# Patient Record
Sex: FEMALE | Race: WHITE | Employment: OTHER | RURAL
[De-identification: names, ages, dates, MRNs, and addresses within clinical notes are randomized per-mention and may not be internally consistent; named-entity substitution may affect disease eponyms.]

---

## 2017-01-13 ENCOUNTER — OFFICE VISIT (OUTPATIENT)
Dept: FAMILY MEDICINE CLINIC | Age: 57
End: 2017-01-13

## 2017-01-13 VITALS
BODY MASS INDEX: 39.16 KG/M2 | DIASTOLIC BLOOD PRESSURE: 82 MMHG | HEIGHT: 63 IN | OXYGEN SATURATION: 99 % | TEMPERATURE: 98.2 F | WEIGHT: 221 LBS | HEART RATE: 93 BPM | SYSTOLIC BLOOD PRESSURE: 122 MMHG | RESPIRATION RATE: 16 BRPM

## 2017-01-13 DIAGNOSIS — F41.9 ANXIETY: ICD-10-CM

## 2017-01-13 DIAGNOSIS — G47.00 INSOMNIA, UNSPECIFIED TYPE: Primary | ICD-10-CM

## 2017-01-13 RX ORDER — TRAZODONE HYDROCHLORIDE 100 MG/1
100 TABLET ORAL
COMMUNITY
End: 2018-05-09

## 2017-01-13 NOTE — PROGRESS NOTES
Identified pt with two pt identifiers(name and ). Chief Complaint   Patient presents with    Panic Attack     last night & this am per pt.  Insomnia      Pt presents today with elevated anxiety, panic attacks & insomnia per pt. Pt states \" i am driving my  crazy with my panic attacks & i cant sleep, i am going to kill myself by mixing all my meds just to get some sleep\"     Health Maintenance Due   Topic    BREAST CANCER SCRN MAMMOGRAM        Wt Readings from Last 3 Encounters:   17 221 lb (100.2 kg)   16 221 lb (100.2 kg)   16 221 lb (100.2 kg)     Temp Readings from Last 3 Encounters:   17 98.2 °F (36.8 °C) (Oral)   16 98 °F (36.7 °C) (Oral)   16 98.1 °F (36.7 °C) (Oral)     BP Readings from Last 3 Encounters:   17 122/82   16 120/78   16 160/79     Pulse Readings from Last 3 Encounters:   17 93   16 (!) 101   16 100         Learning Assessment:  :     Learning Assessment 2016 2015 2015 1/3/2014   PRIMARY LEARNER Patient Patient Patient Patient   HIGHEST LEVEL OF EDUCATION - PRIMARY LEARNER  - - - 4 Luis Antonio LEARNER - NONE - COGNITIVE   CO-LEARNER CAREGIVER - No - -   PRIMARY LANGUAGE ENGLISH ENGLISH ENGLISH ENGLISH   LEARNER PREFERENCE PRIMARY DEMONSTRATION DEMONSTRATION VIDEOS READING     - - - VIDEOS   ANSWERED BY patient  patient patient patient   RELATIONSHIP SELF SELF SELF SELF           Abuse Screening:  :     Abuse Screening Questionnaire 2015 3/10/2014   Do you ever feel afraid of your partner? N N   Are you in a relationship with someone who physically or mentally threatens you? N N   Is it safe for you to go home?  Y Y       Coordination of Care Questionnaire:  :     1) Have you been to an emergency room, urgent care clinic since your last visit? no   Hospitalized since your last visit? no             2) Have you seen or consulted any other health care providers outside of [address] [place] [address] since your last visit? no  (Include any pap smears or colon screenings in this section.)    3) Do you have an Advance Directive on file? no  Are you interested in receiving information about Advance Directives? no    Patient is accompanied by self I have received verbal consent from Kira Bower to discuss any/all medical information while they are present in the room. Reviewed record in preparation for visit and have obtained necessary documentation. Medication reconciliation up to date and corrected with patient at this time.

## 2017-01-13 NOTE — PROGRESS NOTES
Subjective:      Samanta Santos is a 64 y.o. female here with complaint of \"panic attacks\". Her Psychiatrist is Dr. Nia Kelsey. Reports that she has been experiencing shortness of breath. She reports that she has had to open her windows to help calm down her anxiety. She has been mixing medications at night to help her sleep - Elavil, Phenergan, Ambien, Benadryl, alcoholic beverages to help relax her to help her sleep. States that she and her Psychiatrist have worked to wean her off of her Xanax - reports that she was taking 1 mg nightly for sleep. She was started on Trazodone therapy about 3 weeks ago which she reports has been causing her to feel \"wired\". She was also started on another medication which she cannot recall the name for. She reports that she called Dr. Rayo Dos Santos office and scheduled for appointment next week. Current Outpatient Prescriptions   Medication Sig Dispense Refill    tapentadol (NUCYNTA ER) 150 mg Tb12 Take  by mouth.  traZODone (DESYREL) 100 mg tablet Take 100 mg by mouth nightly.  AMITRIPTYLINE HCL (AMITRIPTYLINE PO) Take  by mouth. Indications: unknown dosage, prescribed by her Psychiatrist      terbinafine HCl (LAMISIL) 250 mg tablet Take 1 Tab by mouth daily. X 12 weeks. 90 Tab 0    hydrOXYzine HCl (ATARAX) 25 mg tablet Take 1 Tab by mouth three (3) times daily as needed for Itching. 30 Tab 2    ondansetron (ZOFRAN ODT) 8 mg disintegrating tablet Take 8 mg by mouth every eight (8) hours as needed for Nausea.  promethazine (PHENERGAN) 25 mg tablet Take 25 mg by mouth every six (6) hours as needed for Nausea.  dextroamphetamine-amphetamine (ADDERALL) 10 mg tablet Take 10 mg by mouth two (2) times a day.  CYCLOBENZAPRINE HCL (FLEXERIL PO) Take 10 mg by mouth Before breakfast, lunch, and dinner.  omeprazole (PRILOSEC) 40 mg capsule Take 1 Cap by mouth daily for 90 days.  90 Cap 1    albuterol (PROAIR HFA) 90 mcg/actuation inhaler Take 1 Puff by inhalation every four (4) hours as needed for Wheezing or Shortness of Breath. 1 Inhaler 3    gabapentin (NEURONTIN) 400 mg capsule Take 800 mg by mouth three (3) times daily.  DULoxetine (CYMBALTA) 60 mg capsule Take 60 mg by mouth two (2) times a day. Allergies   Allergen Reactions    Bee Sting [Sting, Bee] Anaphylaxis    Morphine Other (comments)     Severe stomach cramps     Vicodin [Hydrocodone-Acetaminophen] Hives       Past Medical History   Diagnosis Date    Acid reflux 1/17/2011    Anemia     Burn     Chronic pain      Followed by Dr. Trisha Sena Dysthymic disorder 11/9/2012    Fibromyalgia     HNP (herniated nucleus pulposus with myelopathy), thoracic     Kidney calculi     Lymphedema     Memory loss, short term     MI [facility] [address]     Mixed hyperlipidemia 4/3/2013    MVP (mitral valve prolapse)     Neurological disorder      TIA L hemiparesis    PCOS (polycystic ovarian syndrome)     PTSD (post-traumatic stress disorder)     Scoliosis     Scoliosis     Stroke [facility])      april 2014    Vitamin B 12 deficiency        Social History   Substance Use Topics    Smoking status: Never Smoker    Smokeless tobacco: Never Used    Alcohol use No        Review of Systems  Pertinent items are noted in HPI. Objective:     Visit Vitals    /82    Pulse 93    Temp 98.2 °F (36.8 °C) (Oral)    Resp 16    Ht 5' 3\" (1.6 m)    Wt 221 lb (100.2 kg)    LMP 07/15/2011    SpO2 99%    BMI 39.15 kg/m2      General appearance - alert, well appearing, and in no distress  Chest - respirations unlabored   Neurological - alert, oriented, normal speech, no focal findings or movement disorder noted    Assessment/Plan:   Aileen Grewal is a 64 y.o. female seen for:     1. Insomnia, unspecified type: discussed that she should not be mixing her medications with alcohol in an attempt to sleep as this could have dangerous consequences such as death.  She needs to follow back up with Dr. Luz Matias. I advised her that I will not prescribe her Xanax for this condition and will not be prescribing any other sleep medications. Appears that her insomnia may in part be due to her anxiety which she needs to see Dr. Luz Matias. She contacted her counselor who she reports stated that Dr. Luz Matias was to give me a call to discuss my prescribing her Xanax as he could not phone medication in (call did not come while I was in the office). Pharmacy was contacted to see which medications were recently prescribed and I was informed of the following: Elavil 50 mg nightly, trazodone 50 mg nightly, and levetiracetam 500 mg nightly. She did have Xanax prescription on hold (last reported fill date was 11/19/16). Patient was advised of this. 2. Anxiety: under the care of Psychiatry and needs follow up. I have discussed the diagnosis with the patient and the intended plan as seen in the above orders. The patient has received an after-visit summary and questions were answered concerning future plans. I have discussed medication side effects and warnings with the patient as well. Patient verbalizes understanding of plan of care and denies further questions or concerns at this time. Informed patient to return to the office if symptoms worsen or if new symptoms arise.     Follow-up Disposition: Not on File

## 2017-01-23 RX ORDER — PREDNISONE 10 MG/1
TABLET ORAL
Qty: 21 TAB | Refills: 0 | OUTPATIENT
Start: 2017-01-23

## 2017-01-23 NOTE — TELEPHONE ENCOUNTER
Patient called stating that she is having a reaction to laundry detergent and has requested a refill of medication. Patient uses [facility] in [place].      Patient's phone number: [phone]

## 2017-02-21 ENCOUNTER — HOSPITAL ENCOUNTER (OUTPATIENT)
Dept: LAB | Age: 57
Discharge: HOME | End: 2017-02-21
Payer: MEDICARE

## 2017-02-21 ENCOUNTER — OFFICE VISIT (OUTPATIENT)
Dept: FAMILY MEDICINE CLINIC | Age: 57
End: 2017-02-21

## 2017-02-21 VITALS
HEIGHT: 63 IN | HEART RATE: 78 BPM | SYSTOLIC BLOOD PRESSURE: 118 MMHG | RESPIRATION RATE: 16 BRPM | TEMPERATURE: 98.2 F | DIASTOLIC BLOOD PRESSURE: 78 MMHG | OXYGEN SATURATION: 99 % | WEIGHT: 220 LBS | BODY MASS INDEX: 38.98 KG/M2

## 2017-02-21 DIAGNOSIS — R30.0 DYSURIA: ICD-10-CM

## 2017-02-21 DIAGNOSIS — R11.0 NAUSEA: ICD-10-CM

## 2017-02-21 DIAGNOSIS — J01.00 ACUTE NON-RECURRENT MAXILLARY SINUSITIS: Primary | ICD-10-CM

## 2017-02-21 LAB
BILIRUB UR QL STRIP: NEGATIVE
GLUCOSE UR-MCNC: NEGATIVE MG/DL
KETONES P FAST UR STRIP-MCNC: NEGATIVE MG/DL
PH UR STRIP: 5 [PH] (ref 4.6–8)
PROT UR QL STRIP: NEGATIVE MG/DL
SP GR UR STRIP: 1.02 (ref 1–1.03)
UA UROBILINOGEN AMB POC: NORMAL (ref 0.2–1)
URINALYSIS CLARITY POC: CLEAR
URINALYSIS COLOR POC: YELLOW
URINE BLOOD POC: NEGATIVE
URINE LEUKOCYTES POC: NEGATIVE
URINE NITRITES POC: NEGATIVE

## 2017-02-21 PROCEDURE — 87086 URINE CULTURE/COLONY COUNT: CPT

## 2017-02-21 RX ORDER — PROMETHAZINE HYDROCHLORIDE 12.5 MG/1
12.5 TABLET ORAL
Qty: 12 TAB | Refills: 0 | Status: SHIPPED | OUTPATIENT
Start: 2017-02-21 | End: 2018-04-09

## 2017-02-21 RX ORDER — OXYMORPHONE HYDROCHLORIDE 10 MG/1
10 TABLET ORAL
COMMUNITY
End: 2018-05-09

## 2017-02-21 RX ORDER — PROMETHAZINE HYDROCHLORIDE 25 MG/1
25 TABLET ORAL
OUTPATIENT
Start: 2017-02-21

## 2017-02-21 RX ORDER — AMOXICILLIN AND CLAVULANATE POTASSIUM 875; 125 MG/1; MG/1
1 TABLET, FILM COATED ORAL 2 TIMES DAILY
Qty: 20 TAB | Refills: 0 | Status: SHIPPED | OUTPATIENT
Start: 2017-02-21 | End: 2017-03-03

## 2017-02-21 NOTE — MR AVS SNAPSHOT
Visit Information Date & Time Provider Department Dept. Phone Encounter #  
 2/21/2017  3:15 PM Kenneth SotoEthan [phone] [id number] Follow-up Instructions Return if symptoms worsen or fail to improve. Upcoming Health Maintenance Date Due  
 BREAST CANCER SCRN MAMMOGRAM 12/1/2016 COLONOSCOPY 11/28/2017 PAP AKA CERVICAL CYTOLOGY 11/19/2018 DTaP/Tdap/Td series (2 - Td) 6/30/2025 Allergies as of 2/21/2017  Review Complete On: 2/21/2017 By: Kenneth Soto NP Severity Noted Reaction Type Reactions Bee Sting [Sting, Bee]  03/12/2012    Anaphylaxis Morphine  02/04/2016    Other (comments) Severe stomach cramps Vicodin [Hydrocodone-acetaminophen]    Hives Current Immunizations  Reviewed on 6/30/2015 Name Date Influenza Vaccine Alcario Ravens) 10/21/2016 Influenza Vaccine PF 11/13/2013 Influenza Vaccine Whole 10/1/2012 Tdap 6/30/2015  3:59 PM  
  
 Not reviewed this visit You Were Diagnosed With   
  
 Codes Comments Acute non-recurrent maxillary sinusitis    -  Primary ICD-10-CM: J01.00 ICD-9-CM: 461.0 Dysuria     ICD-10-CM: R30.0 ICD-9-CM: 788.1 Nausea     ICD-10-CM: R11.0 ICD-9-CM: 787.02 Vitals BP Pulse Temp Resp Height(growth percentile) Weight(growth percentile) 118/78 78 98.2 °F (36.8 °C) (Oral) 16 5' 3\" (1.6 m) 220 lb (99.8 kg) LMP SpO2 BMI OB Status Smoking Status 07/15/2011 99% 38.97 kg/m2 Postmenopausal Never Smoker BMI and BSA Data Body Mass Index Body Surface Area  
 38.97 kg/m 2 2.11 m 2 Preferred Pharmacy Pharmacy Name Phone [facility] [address] [place] [address] [phone] Your Updated Medication List  
  
   
This list is accurate as of: 2/21/17  3:22 PM.  Always use your most recent med list.  
  
  
  
  
 albuterol 90 mcg/actuation inhaler Commonly known as:  PROAIR HFA  
 Take 1 Puff by inhalation every four (4) hours as needed for Wheezing or Shortness of Breath. AMITRIPTYLINE PO Take  by mouth. Indications: unknown dosage, prescribed by her Psychiatrist  
  
 amoxicillin-clavulanate 875-125 mg per tablet Commonly known as:  AUGMENTIN Take 1 Tab by mouth two (2) times a day for 10 days. CYMBALTA 60 mg capsule Generic drug:  DULoxetine Take 60 mg by mouth two (2) times a day. dextroamphetamine-amphetamine 10 mg tablet Commonly known as:  ADDERALL Take 10 mg by mouth two (2) times a day. FLEXERIL PO Take 10 mg by mouth Before breakfast, lunch, and dinner. gabapentin 400 mg capsule Commonly known as:  NEURONTIN Take 800 mg by mouth three (3) times daily. hydrOXYzine HCl 25 mg tablet Commonly known as:  ATARAX Take 1 Tab by mouth three (3) times daily as needed for Itching. NUCYNTA  mg Tb12 Generic drug:  tapentadol Take  by mouth. omeprazole 40 mg capsule Commonly known as:  PRILOSEC Take 1 Cap by mouth daily for 90 days. oxyMORphone 10 mg Tab tablet Commonly known as:  OPANA Take 10 mg by mouth every six (6) hours as needed for Pain. promethazine 12.5 mg tablet Commonly known as:  PHENERGAN Take 1 Tab by mouth every eight (8) hours as needed for Nausea. terbinafine HCl 250 mg tablet Commonly known as:  LAMISIL Take 1 Tab by mouth daily. X 12 weeks. traZODone 100 mg tablet Commonly known as:  Pavel Nathalia Take 100 mg by mouth nightly. ZOFRAN ODT 8 mg disintegrating tablet Generic drug:  ondansetron Take 8 mg by mouth every eight (8) hours as needed for Nausea. Prescriptions Sent to Pharmacy Refills  
 amoxicillin-clavulanate (AUGMENTIN) 875-125 mg per tablet 0 Sig: Take 1 Tab by mouth two (2) times a day for 10 days. Class: Normal  
 Pharmacy: [address] [place] Ph #: [phone] Route: Oral  
 promethazine (PHENERGAN) 12.5 mg tablet 0 Sig: Take 1 Tab by mouth every eight (8) hours as needed for Nausea. Class: Normal  
 [facility]  #: [phone] Route: Oral  
  
We Performed the Following AMB POC URINALYSIS DIP STICK AUTO W/O MICRO [64165 CPT(R)] CULTURE, URINE [id number] CPT(R)] Follow-up Instructions Return if symptoms worsen or fail to improve. Patient Instructions Sinusitis: Care Instructions Your Care Instructions Sinusitis is an infection of the lining of the sinus cavities in your head. Sinusitis often follows a cold. It causes pain and pressure in your head and face. In most cases, sinusitis gets better on its own in 1 to 2 weeks. But some mild symptoms may last for several weeks. Sometimes antibiotics are needed. Follow-up care is a key part of your treatment and safety. Be sure to make and go to all appointments, and call your doctor if you are having problems. It's also a good idea to know your test results and keep a list of the medicines you take. How can you care for yourself at home? · Take an over-the-counter pain medicine, such as acetaminophen (Tylenol), ibuprofen (Advil, Motrin), or naproxen (Aleve). Read and follow all instructions on the label. · If the doctor prescribed antibiotics, take them as directed. Do not stop taking them just because you feel better. You need to take the full course of antibiotics. · Be careful when taking over-the-counter cold or flu medicines and Tylenol at the same time. Many of these medicines have acetaminophen, which is Tylenol. Read the labels to make sure that you are not taking more than the recommended dose. Too much acetaminophen (Tylenol) can be harmful. · Breathe warm, moist air from a steamy shower, a hot bath, or a sink filled with hot water. Avoid cold, dry air.  Using a humidifier in your home may help. Follow the directions for cleaning the machine. · Use saline (saltwater) nasal washes to help keep your nasal passages open and wash out mucus and bacteria. You can buy saline nose drops at a grocery store or drugstore. Or you can make your own at home by adding 1 teaspoon of salt and 1 teaspoon of baking soda to 2 cups of distilled water. If you make your own, fill a bulb syringe with the solution, insert the tip into your nostril, and squeeze gently. Melvia Creamer your nose. · Put a hot, wet towel or a warm gel pack on your face 3 or 4 times a day for 5 to 10 minutes each time. · Try a decongestant nasal spray like oxymetazoline (Afrin). Do not use it for more than 3 days in a row. Using it for more than 3 days can make your congestion worse. When should you call for help? Call your doctor now or seek immediate medical care if: 
· You have new or worse swelling or redness in your face or around your eyes. · You have a new or higher fever. Watch closely for changes in your health, and be sure to contact your doctor if: 
· You have new or worse facial pain. · The mucus from your nose becomes thicker (like pus) or has new blood in it. · You are not getting better as expected. Where can you learn more? Go to http://jonathon-margareth.info/. Enter B792 in the search box to learn more about \"Sinusitis: Care Instructions. \" Current as of: July 29, 2016 Content Version: 11.1 © 1320-1623 [employer]. Care instructions adapted under license by [facility] (which disclaims liability or warranty for this information). If you have questions about a medical condition or this instruction, always ask your healthcare professional. [place] [address] any warranty or liability for your use of this information. Introducing [facility]! Dear Delores Márquez: 
Thank you for requesting a [employer] account.   Our records indicate that you already have an active [employer] account. You can access your account anytime at [email or web address]. [email or web address] Did you know that you can access your hospital and ER discharge instructions at any time in [employer]? You can also review all of your test results from your hospital stay or ER visit. Additional Information If you have questions, please visit the Frequently Asked Questions section of the [employer] website at [email or web address] Remember, [employer] is NOT to be used for urgent needs. For medical emergencies, dial 911. Now available from your iPhone and Android! Please provide this summary of care documentation to your next provider. Your primary care clinician is listed as [address]. If you have any questions after today's visit, please call [phone].

## 2017-02-21 NOTE — PATIENT INSTRUCTIONS
Sinusitis: Care Instructions  Your Care Instructions    Sinusitis is an infection of the lining of the sinus cavities in your head. Sinusitis often follows a cold. It causes pain and pressure in your head and face. In most cases, sinusitis gets better on its own in 1 to 2 weeks. But some mild symptoms may last for several weeks. Sometimes antibiotics are needed. Follow-up care is a key part of your treatment and safety. Be sure to make and go to all appointments, and call your doctor if you are having problems. It's also a good idea to know your test results and keep a list of the medicines you take. How can you care for yourself at home? · Take an over-the-counter pain medicine, such as acetaminophen (Tylenol), ibuprofen (Advil, Motrin), or naproxen (Aleve). Read and follow all instructions on the label. · If the doctor prescribed antibiotics, take them as directed. Do not stop taking them just because you feel better. You need to take the full course of antibiotics. · Be careful when taking over-the-counter cold or flu medicines and Tylenol at the same time. Many of these medicines have acetaminophen, which is Tylenol. Read the labels to make sure that you are not taking more than the recommended dose. Too much acetaminophen (Tylenol) can be harmful. · Breathe warm, moist air from a steamy shower, a hot bath, or a sink filled with hot water. Avoid cold, dry air. Using a humidifier in your home may help. Follow the directions for cleaning the machine. · Use saline (saltwater) nasal washes to help keep your nasal passages open and wash out mucus and bacteria. You can buy saline nose drops at a grocery store or drugstore. Or you can make your own at home by adding 1 teaspoon of salt and 1 teaspoon of baking soda to 2 cups of distilled water. If you make your own, fill a bulb syringe with the solution, insert the tip into your nostril, and squeeze gently. Alease Ruffini your nose.   · Put a hot, wet towel or a warm gel pack on your face 3 or 4 times a day for 5 to 10 minutes each time. · Try a decongestant nasal spray like oxymetazoline (Afrin). Do not use it for more than 3 days in a row. Using it for more than 3 days can make your congestion worse. When should you call for help? Call your doctor now or seek immediate medical care if:  · You have new or worse swelling or redness in your face or around your eyes. · You have a new or higher fever. Watch closely for changes in your health, and be sure to contact your doctor if:  · You have new or worse facial pain. · The mucus from your nose becomes thicker (like pus) or has new blood in it. · You are not getting better as expected. Where can you learn more? Go to http://jonathon-margareth.info/. Enter D000 in the search box to learn more about \"Sinusitis: Care Instructions. \"  Current as of: July 29, 2016  Content Version: 11.1  © 20062731-3141 [employer]. Care instructions adapted under license by [employer] (which disclaims liability or warranty for this information). If you have questions about a medical condition or this instruction, always ask your healthcare professional. [place] [address] any warranty or liability for your use of this information.

## 2017-02-21 NOTE — PROGRESS NOTES
Identified pt with two pt identifiers(name and ). Chief Complaint   Patient presents with    Urinary Frequency    Cold Symptoms    Sinus Pain        Health Maintenance Due   Topic    BREAST CANCER SCRN MAMMOGRAM        Wt Readings from Last 3 Encounters:   17 220 lb (99.8 kg)   17 221 lb (100.2 kg)   16 221 lb (100.2 kg)     Temp Readings from Last 3 Encounters:   17 98.2 °F (36.8 °C) (Oral)   17 98.2 °F (36.8 °C) (Oral)   16 98 °F (36.7 °C) (Oral)     BP Readings from Last 3 Encounters:   17 118/78   17 122/82   16 120/78     Pulse Readings from Last 3 Encounters:   17 78   17 93   16 (!) 101         Learning Assessment:  :     Learning Assessment 2016 2015 2015 1/3/2014   PRIMARY LEARNER Patient Patient Patient Patient   HIGHEST LEVEL OF EDUCATION - PRIMARY LEARNER  - - - 4 YEARS OF COLLEGE   BARRIERS PRIMARY LEARNER - NONE - COGNITIVE   CO-LEARNER CAREGIVER - No - -   PRIMARY LANGUAGE ENGLISH ENGLISH ENGLISH ENGLISH   LEARNER PREFERENCE PRIMARY DEMONSTRATION DEMONSTRATION VIDEOS READING     - - - VIDEOS   ANSWERED BY patient  patient patient patient   RELATIONSHIP SELF SELF SELF SELF           Abuse Screening:  :     Abuse Screening Questionnaire 2015 3/10/2014   Do you ever feel afraid of your partner? N N   Are you in a relationship with someone who physically or mentally threatens you? N N   Is it safe for you to go home? Y Y       Coordination of Care Questionnaire:  :     1) Have you been to an emergency room, urgent care clinic since your last visit? no   Hospitalized since your last visit? no             2) Have you seen or consulted any other health care providers outside of [address] [place] [address] since your last visit? no  (Include any pap smears or colon screenings in this section.)    3) Do you have an Advance Directive on file?  no  Are you interested in receiving information about Advance Directives? no    Patient is accompanied by self I have received verbal consent from Mau Angel to discuss any/all medical information while they are present in the room. Reviewed record in preparation for visit and have obtained necessary documentation. Medication reconciliation up to date and corrected with patient at this time.

## 2017-02-21 NOTE — TELEPHONE ENCOUNTER
Unclear why patient needs medication. She has only been seen by myself for acute visit only. Would advise that she be seen in the office to discuss need for medication.

## 2017-02-21 NOTE — PROGRESS NOTES
HISTORY OF PRESENT ILLNESS  Yani Fischer is a 64 y.o. female. HPI  Pt presents with \"urinary frequency and cold symptoms\"    Pt states that she feels like she may have a bladder infection. Symptoms started yesterday, and have been worsening since then. Only symptom that patient is having is increased urinary frequency. Pt states that it feels like she has to \"pee all the time\". No pain with urination, no blood in urine  No abdominal pain  No vaginal discharge, no vaginal pain    In addition, patient believes that she has a sinus infection. Sinus pain and pressure has been present for at least 5 days, and seems to be worsening. Thick, yellow/green nasal congestion  Sinus pain  Bilateral ear pain  No fever    Lastly, patient states that there are 2 medications that she used to get from her pain management doctor, Dr. Wily Jones, that she would like to see if she could get at this practice. These medications are Cymbalta, and Gabapentin. Pt states that it is difficult for her to get in with this provider, and since these are not narcotics, she does not see why there would be a problem with us prescribing them. Pt is not sure of exact dosing of both of these medications, and we do not have notes from Dr. Wily Jones. Pt is continuing to see her psychiatrist, Dr. Bernabe Chavira, for all of her psych medications. Pt also requesting refill of Phenergan, for nausea that she gets post gastric bypass, at times. Review of Systems   Constitutional: Negative for fever. HENT: Positive for congestion and ear pain. Gastrointestinal: Negative for abdominal pain, diarrhea and vomiting. Genitourinary: Positive for frequency. Negative for dysuria, hematuria and urgency. Neurological: Positive for headaches. Physical Exam   Constitutional: She is oriented to person, place, and time. She appears well-developed and well-nourished. HENT:   Head: Normocephalic and atraumatic.    Right Ear: Hearing, tympanic membrane, external ear and ear canal normal.   Left Ear: Hearing, tympanic membrane, external ear and ear canal normal.   Nose: Mucosal edema and rhinorrhea present. Right sinus exhibits maxillary sinus tenderness and frontal sinus tenderness. Left sinus exhibits maxillary sinus tenderness and frontal sinus tenderness. Mouth/Throat: Oropharynx is clear and moist.   Neck: Normal range of motion. Neck supple. Cardiovascular: Normal rate, regular rhythm and normal heart sounds. Pulmonary/Chest: Effort normal and breath sounds normal.   Abdominal: Soft. Bowel sounds are normal. There is no tenderness. There is no guarding. Lymphadenopathy:     She has no cervical adenopathy. Neurological: She is alert and oriented to person, place, and time. Skin: Skin is warm and dry. Psychiatric: She has a normal mood and affect. Her behavior is normal.       ASSESSMENT and PLAN    ICD-10-CM ICD-9-CM    1. Acute non-recurrent maxillary sinusitis J01.00 461.0 amoxicillin-clavulanate (AUGMENTIN) 875-125 mg per tablet   2. Dysuria R30.0 788.1 AMB POC URINALYSIS DIP STICK AUTO W/O MICRO      CULTURE, URINE   3. Nausea R11.0 787.02 promethazine (PHENERGAN) 12.5 mg tablet     Informed patient that I have sent antibiotic to the pharmacy, and she should take as prescribed. Educated about taking with food, to decrease the risk of stomach upset. Educated about staying well hydrated, and voiding as often as she feels the urge. Informed patient that we will notify her when her urine culture returns, and inform her of any change in plan of care at that time. Educated that these medications could be filled here (Cymbalta and Gabapentin), but she needs to get office notes and medications faxed from Dr. Onel Lopez office, in order to ensure correct dosing, etc.    Pt informed to return to office with worsening of symptoms, or PRN with any questions or concerns.   Pt verbalizes understanding of plan of care and denies further questions or concerns at this time.

## 2017-02-23 ENCOUNTER — TELEPHONE (OUTPATIENT)
Dept: FAMILY MEDICINE CLINIC | Age: 57
End: 2017-02-23

## 2017-02-23 DIAGNOSIS — R35.0 URINARY FREQUENCY: Primary | ICD-10-CM

## 2017-02-23 LAB
BACTERIA UR CULT: NORMAL
BACTERIA UR CULT: NORMAL

## 2017-02-23 NOTE — PROGRESS NOTES
Please call patient and let her know that her urine culture returned negative, no UTI. As discussed in office, she can make appointment with urology to discuss urinary frequency. [phone]. Thanks!

## 2017-02-23 NOTE — TELEPHONE ENCOUNTER
----- Message from Kwan Pedro NP sent at 2/23/2017  8:11 AM EST -----  Please call patient and let her know that her urine culture returned negative, no UTI. As discussed in office, she can make appointment with urology to discuss urinary frequency. [phone]. Thanks!

## 2017-03-03 ENCOUNTER — APPOINTMENT (OUTPATIENT)
Dept: GENERAL RADIOLOGY | Age: 57
End: 2017-03-03
Attending: EMERGENCY MEDICINE
Payer: MEDICARE

## 2017-03-03 ENCOUNTER — HOSPITAL ENCOUNTER (EMERGENCY)
Age: 57
Discharge: HOME | End: 2017-03-03
Attending: EMERGENCY MEDICINE
Payer: MEDICARE

## 2017-03-03 VITALS
DIASTOLIC BLOOD PRESSURE: 77 MMHG | HEIGHT: 65 IN | HEART RATE: 89 BPM | WEIGHT: 220 LBS | TEMPERATURE: 98.3 F | SYSTOLIC BLOOD PRESSURE: 135 MMHG | OXYGEN SATURATION: 99 % | RESPIRATION RATE: 16 BRPM | BODY MASS INDEX: 36.65 KG/M2

## 2017-03-03 DIAGNOSIS — M25.562 ACUTE PAIN OF LEFT KNEE: Primary | ICD-10-CM

## 2017-03-03 PROCEDURE — 99282 EMERGENCY DEPT VISIT SF MDM: CPT

## 2017-03-03 PROCEDURE — 73562 X-RAY EXAM OF KNEE 3: CPT

## 2017-03-03 NOTE — ED PROVIDER NOTES
HPI Comments: 64 y.o. female with extensive past medical history, please see list, significant for chronic pain, scoliosis, TIA, hyperlipidemia, fibromyalgia, MVP, MI, HNP and gastric bypass who presents from home with chief complaint of knee pain. Pt states that last night at 2030 when she was moving from a sitting to standing position, she felt her left knee \"pop\" causing radiating pain down to the heel, back into the hip and into the shoulders and left side of the neck. Pt says that she received an injection in the left knee 6 weeks ago. Pt notes that she has a pain management doctor and currently takes Oxymorphone, Oxycodone, Flexeril, Neurontin and Cymbalta, none of which helped her pain. Pt also reports having used a heating pad w/o relief. Pt states that she is unable to lift the left leg and has to drag it. Pt says that she uses a walker at home. Pt claims to have seen [facility] in the past. There are no other acute medical concerns at this time. PCP: Joshua Oakely MD  Pain Managment: Dr. Brooklynn Bonner. Wily Jones MD and Dr. Van Medina MD    Note written by Angie Paulino. Chantell Patel, as dictated by Mariposa Rodriguez MD 1:54 PM      The history is provided by the patient. No  was used.         Past Medical History:   Diagnosis Date    Acid reflux 1/17/2011    Anemia     Burn     Chronic pain     Followed by Dr. Anitha Carrera Dysthymic disorder 11/9/2012    Fibromyalgia     HNP (herniated nucleus pulposus with myelopathy), thoracic     Kidney calculi     Lymphedema     Memory loss, short term     MI [facility]ocardial infarction) ([facility] [address]     Mixed hyperlipidemia 4/3/2013    MVP (mitral valve prolapse)     Neurological disorder     TIA L hemiparesis    PCOS (polycystic ovarian syndrome)     PTSD (post-traumatic stress disorder)     Scoliosis     Scoliosis     Stroke [facility])     april 2014    Vitamin B 12 deficiency        Past Surgical History:   Procedure Laterality Date    CARDIAC SURG PROCEDURE UNLIST      heart attack, mitral valve prolapse    HX  SECTION  1985    , PCOS    HX CHOLECYSTECTOMY  99    [facility]: open gastric bypass, naseem,  umb hernia repair, tr. vagotomy    HX GASTRIC BYPASS  99    HX ORTHOPAEDIC  1953,1289,    bunion surgery, heel spur surgery         Family History:   Problem Relation Age of Onset    Cancer Mother     Hypertension Mother     Diabetes Father     Stroke Father     Hypertension Father        Social History     Social History    Marital status: LEGALLY      Spouse name: N/A    Number of children: N/A    Years of education: N/A     Occupational History    Not on file. Social History Main Topics    Smoking status: Never Smoker    Smokeless tobacco: Never Used    Alcohol use No    Drug use: No    Sexual activity: Yes     Birth control/ protection: None     Other Topics Concern     Service No    Blood Transfusions Yes     unknown    Caffeine Concern No    Occupational Exposure No    Hobby Hazards No    Sleep Concern Yes    Stress Concern Yes    Weight Concern Yes    Special Diet No    Back Care No    Exercise No    Bike Helmet No    Seat Belt Yes    Self-Exams Yes     Social History Narrative         ALLERGIES: Bee sting [sting, bee]; Morphine; and Vicodin [hydrocodone-acetaminophen]    Review of Systems   Musculoskeletal: Positive for neck pain. Pain of the left knee, radiating into the left hip and bilateral shoulder. All other systems reviewed and are negative. Vitals:    17 1341   BP: 139/80   Pulse: 93   Resp: 18   Temp: 98.3 °F (36.8 °C)   SpO2: 99%   Weight: 99.8 kg (220 lb)   Height: 5' 5\" (1.651 m)            Physical Exam   Constitutional: She is oriented to person, place, and time. No distress. Obese. HENT:   Head: Normocephalic and atraumatic. Eyes: Conjunctivae are normal. No scleral icterus. Neck: Neck supple. No tracheal deviation present. Cardiovascular: Normal rate, regular rhythm, normal heart sounds and intact distal pulses. Exam reveals no gallop and no friction rub. No murmur heard. 2+ pedal pulse. Pulmonary/Chest: Effort normal and breath sounds normal. She has no wheezes. She has no rales. Abdominal: Soft. She exhibits no distension. There is no tenderness. There is no rebound and no guarding. Musculoskeletal: She exhibits tenderness (medially, laterally, and posteriorly). She exhibits no edema. Not able to appreciate obvious swelling. Resists ROM. Neurological: She is alert and oriented to person, place, and time. Skin: Skin is warm and dry. No rash noted. Psychiatric: She has a normal mood and affect. Nursing note and vitals reviewed. Note written by Shawn Hatfield. Jamil Valdez, as dictated by Aysha Mcneil MD 1:54 PM      [facility]  ED Course       Procedures    Progress Note:  Results, treatment, and follow up plan have been discussed with patient. Questions were answered. Aysha Mcneil MD  3:42 PM    A/P: left knee pain - suspect sprain; ice/ rest/ NSAID/ ortho f/u.   Aysha Mcneil MD  3:42 PM

## 2017-03-03 NOTE — DISCHARGE INSTRUCTIONS
Knee Pain or Injury: Care Instructions  Your Care Instructions    Injuries are a common cause of knee problems. Sudden (acute) injuries may be caused by a direct blow to the knee. They can also be caused by abnormal twisting, bending, or falling on the knee. Pain, bruising, or swelling may be severe, and may start within minutes of the injury. Overuse is another cause of knee pain. Other causes are climbing stairs, kneeling, and other activities that use the knee. Everyday wear and tear, especially as you get older, also can cause knee pain. Rest, along with home treatment, often relieves pain and allows your knee to heal. If you have a serious knee injury, you may need tests and treatment. Follow-up care is a key part of your treatment and safety. Be sure to make and go to all appointments, and call your doctor if you are having problems. It's also a good idea to know your test results and keep a list of the medicines you take. How can you care for yourself at home? · Be safe with medicines. Read and follow all instructions on the label. ¨ If the doctor gave you a prescription medicine for pain, take it as prescribed. ¨ If you are not taking a prescription pain medicine, ask your doctor if you can take an over-the-counter medicine. · Rest and protect your knee. Take a break from any activity that may cause pain. · Put ice or a cold pack on your knee for 10 to 20 minutes at a time. Put a thin cloth between the ice and your skin. · Prop up a sore knee on a pillow when you ice it or anytime you sit or lie down for the next 3 days. Try to keep it above the level of your heart. This will help reduce swelling. · If your knee is not swollen, you can put moist heat, a heating pad, or a warm cloth on your knee. · If your doctor recommends an elastic bandage, sleeve, or other type of support for your knee, wear it as directed.   · Follow your doctor's instructions about how much weight you can put on your leg. Use a cane, crutches, or a walker as instructed. · Follow your doctor's instructions about activity during your healing process. If you can do mild exercise, slowly increase your activity. · Reach and stay at a healthy weight. Extra weight can strain the joints, especially the knees and hips, and make the pain worse. Losing even a few pounds may help. When should you call for help? Call 911 anytime you think you may need emergency care. For example, call if:  · You have symptoms of a blood clot in your lung (called a pulmonary embolism). These may include:  ¨ Sudden chest pain. ¨ Trouble breathing. ¨ Coughing up blood. Call your doctor now or seek immediate medical care if:  · You have severe or increasing pain. · Your leg or foot turns cold or changes color. · You cannot stand or put weight on your knee. · Your knee looks twisted or bent out of shape. · You cannot move your knee. · You have signs of infection, such as:  ¨ Increased pain, swelling, warmth, or redness. ¨ Red streaks leading from the knee. ¨ Pus draining from a place on your knee. ¨ A fever. · You have signs of a blood clot in your leg (called a deep vein thrombosis), such as:  ¨ Pain in your calf, back of the knee, thigh, or groin. ¨ Redness and swelling in your leg or groin. Watch closely for changes in your health, and be sure to contact your doctor if:  · You have tingling, weakness, or numbness in your knee. · You have any new symptoms, such as swelling. · You have bruises from a knee injury that last longer than 2 weeks. · You do not get better as expected. Where can you learn more? Go to http://jonathon-margareth.info/. Enter K195 in the search box to learn more about \"Knee Pain or Injury: Care Instructions. \"  Current as of: May 27, 2016  Content Version: 11.1  © 5375-2450 [employer].  Care instructions adapted under license by [employer] (which disclaims liability or warranty for this information). If you have questions about a medical condition or this instruction, always ask your healthcare professional. [address] any warranty or liability for your use of this information. We hope that we have addressed all of your medical concerns. The examination and treatment you received in the Emergency Department were for an emergent problem and were not intended as complete care. It is important that you follow up with your healthcare provider(s) for ongoing care. If your symptoms worsen or do not improve as expected, and you are unable to reach your usual health care provider(s), you should return to the Emergency Department. Today's healthcare is undergoing tremendous change, and patient satisfaction surveys are one of the many tools to assess the quality of medical care. You may receive a survey from the [employer] regarding your experience in the Emergency Department. I hope that your experience has been completely positive, particularly the medical care that I provided. As such, please participate in the survey; anything less than excellent does not meet my expectations or intentions. [address] [place] [address] and [employer] participate in nationally recognized quality of care measures. If your blood pressure is greater than 120/80, as reported below, we urge that you seek medical care to address the potential of high blood pressure, commonly known as hypertension. Hypertension can be hereditary or can be caused by certain medical conditions, pain, stress, or \"white coat syndrome. \"       Please make an appointment with your health care provider(s) for follow up of your Emergency Department visit. VITALS:   Patient Vitals for the past 8 hrs:   Temp Pulse Resp BP SpO2   03/03/17 1341 98.3 °F (36.8 °C) 93 18 139/80 99 %          Thank you for allowing us to provide you with medical care today.   We realize that you have many choices for your emergency care needs. Please choose us in the future for any continued health care needs. Jacki Osei MD    [facility]   Office: [phone]      Xr Knee Lt 3 V    Result Date: 3/3/2017  INDICATION: Left knee popping when stood up last night. Exam: AP, lateral, oblique views of the left knee. FINDINGS: There is no acute fracture-dislocation. There is moderate narrowing of the medial tibiofemoral joint space. There are tricompartmental osteophytes. IMPRESSION: No acute fracture or dislocation.

## 2017-03-03 NOTE — ED TRIAGE NOTES
Pt states \"knee popping\" when she stood up last night. Reports immediate shooting pain in ankle, heel. Pain came up side and into neck. Continuous pain today.

## 2017-03-06 ENCOUNTER — OFFICE VISIT (OUTPATIENT)
Dept: FAMILY MEDICINE CLINIC | Age: 57
End: 2017-03-06

## 2017-03-06 VITALS
BODY MASS INDEX: 36.65 KG/M2 | HEIGHT: 65 IN | TEMPERATURE: 98.3 F | WEIGHT: 220 LBS | OXYGEN SATURATION: 94 % | DIASTOLIC BLOOD PRESSURE: 82 MMHG | RESPIRATION RATE: 16 BRPM | HEART RATE: 76 BPM | SYSTOLIC BLOOD PRESSURE: 130 MMHG

## 2017-03-06 DIAGNOSIS — R41.3 MEMORY CHANGES: Primary | ICD-10-CM

## 2017-03-06 DIAGNOSIS — M54.50 CHRONIC MIDLINE LOW BACK PAIN WITHOUT SCIATICA: ICD-10-CM

## 2017-03-06 DIAGNOSIS — G89.29 CHRONIC MIDLINE LOW BACK PAIN WITHOUT SCIATICA: ICD-10-CM

## 2017-03-06 DIAGNOSIS — M25.562 ACUTE PAIN OF LEFT KNEE: ICD-10-CM

## 2017-03-06 DIAGNOSIS — R41.3 MEMORY LOSS: ICD-10-CM

## 2017-03-06 RX ORDER — GABAPENTIN 400 MG/1
800 CAPSULE ORAL
Qty: 180 CAP | Refills: 3 | Status: SHIPPED | OUTPATIENT
Start: 2017-03-06 | End: 2018-03-26 | Stop reason: SDUPTHER

## 2017-03-06 RX ORDER — DULOXETIN HYDROCHLORIDE 60 MG/1
60 CAPSULE, DELAYED RELEASE ORAL 2 TIMES DAILY
Qty: 60 CAP | Refills: 2 | Status: SHIPPED | OUTPATIENT
Start: 2017-03-06 | End: 2017-06-12 | Stop reason: SDUPTHER

## 2017-03-06 NOTE — PROGRESS NOTES
HISTORY OF PRESENT ILLNESS  Nellie Limon is a 64 y.o. female. HPI  Pt presents with \"medication refill\"    Pt brought in documentation of her medications from Dr. Roby Nicholson office, that she would like to have filled here. These medications are Cymbalta and Gabapentin. Pt states that she went to the ER last week, as she was having left knee pain, after hearing a popping noise. X ray was normal, and they referred her to ortho. Pt has yet to be seen by ortho, as she does not have insurance, only the care card. Pt states that she had an appointment with her pain doctor whom performs injections on her, Dr. Evelio Warren, and he ordered an MRI of her left knee, as he believes that the symptoms are in relation to a meniscal tear. Pt is requesting that I order the MRI, so that she is able to get this done at a [facility] facility, due to care card. She has made an appointment with ortho, Dr. Sulma Carvalho, at [address] [place] [address], for Wednesday at 2:45 PM.    In addition, Dr Marcus Duval, her psychiatrist, wants her to get neurology appointment, for memory changes and loss. Pt states that she has had difficulty remembering many details, and this has been going on since 2014. Pt states that \"Dr. Marcus Duval believes that she may have dementia\". Pt states that she is going to stop seeing Dr. Mike Dos Santos, her pain management doctor, as Oscar Fraga does not want to be on any more opioids\". Review of Systems   Constitutional: Negative for fever. HENT: Negative for congestion. Respiratory: Negative for cough. Musculoskeletal: Positive for joint pain. Physical Exam   Constitutional: She is oriented to person, place, and time. She appears well-developed and well-nourished. HENT:   Head: Normocephalic and atraumatic. Neck: Normal range of motion. Neck supple. Cardiovascular: Normal rate, regular rhythm and normal heart sounds.     Pulmonary/Chest: Effort normal and breath sounds normal.   Musculoskeletal:        Left knee: She exhibits decreased range of motion. Lymphadenopathy:     She has no cervical adenopathy. Neurological: She is alert and oriented to person, place, and time. Skin: Skin is warm and dry. Psychiatric: She has a normal mood and affect. Her behavior is normal.       ASSESSMENT and PLAN    ICD-10-CM ICD-9-CM    1. Memory changes R41.3 780.93 REFERRAL TO NEUROLOGY   2. Memory loss R41.3 780.93 REFERRAL TO NEUROLOGY   3. Acute pain of left knee M25.562 719.46 MRI KNEE LT WO CONT   4. Chronic midline low back pain without sciatica M54.5 724.2 gabapentin (NEURONTIN) 400 mg capsule    G89.29 338.29 DULoxetine (CYMBALTA) 60 mg capsule     Informed patient that due to difficult circumstances, I will order the MRI of the knee. Educated about importance of being seen by orthopedics, as this truly needs to be managed by a specialist.    Educated about calling neurology for appointment, ASAP. Pt informed to return to office with worsening of symptoms, or PRN with any questions or concerns. Pt verbalizes understanding of plan of care and denies further questions or concerns at this time.

## 2017-03-06 NOTE — MR AVS SNAPSHOT
Visit Information Date & Time Provider Department Dept. Phone Encounter #  
 3/6/2017  3:15 PM Elias Rothman NP [address] [place] [address] [phone] [id number] Follow-up Instructions Return if symptoms worsen or fail to improve. Upcoming Health Maintenance Date Due  
 BREAST CANCER SCRN MAMMOGRAM 12/1/2016 COLONOSCOPY 11/28/2017 PAP AKA CERVICAL CYTOLOGY 11/19/2018 DTaP/Tdap/Td series (2 - Td) 6/30/2025 Allergies as of 3/6/2017  Review Complete On: 3/6/2017 By: Elias Rothman NP Severity Noted Reaction Type Reactions Bee Sting [Sting, Bee]  03/12/2012    Anaphylaxis Morphine  02/04/2016    Other (comments) Severe stomach cramps Vicodin [Hydrocodone-acetaminophen]    Hives Current Immunizations  Reviewed on 6/30/2015 Name Date Influenza Vaccine Jorgeam Buerger) 10/21/2016 Influenza Vaccine PF 11/13/2013 Influenza Vaccine Whole 10/1/2012 Tdap 6/30/2015  3:59 PM  
  
 Not reviewed this visit You Were Diagnosed With   
  
 Codes Comments Memory changes    -  Primary ICD-10-CM: R41.3 ICD-9-CM: 780.93 Memory loss     ICD-10-CM: R41.3 ICD-9-CM: 780.93 Acute pain of left knee     ICD-10-CM: M25.562 ICD-9-CM: 719.46 Chronic midline low back pain without sciatica     ICD-10-CM: M54.5, G89.29 ICD-9-CM: 724.2, 338.29 Vitals BP Pulse Temp Resp Height(growth percentile) Weight(growth percentile) 130/82 76 98.3 °F (36.8 °C) (Oral) 16 5' 5\" (1.651 m) 220 lb (99.8 kg) LMP SpO2 BMI OB Status Smoking Status 07/15/2011 94% 36.61 kg/m2 Postmenopausal Never Smoker BMI and BSA Data Body Mass Index Body Surface Area  
 36.61 kg/m 2 2.14 m 2 Preferred Pharmacy Pharmacy Name Phone [facility] [address] [place] [address] [phone] Your Updated Medication List  
  
   
This list is accurate as of: 3/6/17  3:19 PM.  Always use your most recent med list.  
  
  
  
 albuterol 90 mcg/actuation inhaler Commonly known as:  PROAIR HFA Take 1 Puff by inhalation every four (4) hours as needed for Wheezing or Shortness of Breath. AMITRIPTYLINE PO Take  by mouth. Indications: unknown dosage, prescribed by her Psychiatrist  
  
 dextroamphetamine-amphetamine 10 mg tablet Commonly known as:  ADDERALL Take 10 mg by mouth two (2) times a day. DULoxetine 60 mg capsule Commonly known as:  CYMBALTA Take 1 Cap by mouth two (2) times a day. FLEXERIL PO Take 10 mg by mouth Before breakfast, lunch, and dinner. gabapentin 400 mg capsule Commonly known as:  NEURONTIN Take 2 Caps by mouth three (3) times daily as needed. hydrOXYzine HCl 25 mg tablet Commonly known as:  ATARAX Take 1 Tab by mouth three (3) times daily as needed for Itching. NUCYNTA  mg Tb12 Generic drug:  tapentadol Take  by mouth. omeprazole 40 mg capsule Commonly known as:  PRILOSEC Take 1 Cap by mouth daily for 90 days. oxyMORphone 10 mg Tab tablet Commonly known as:  OPANA Take 10 mg by mouth every six (6) hours as needed for Pain. promethazine 12.5 mg tablet Commonly known as:  PHENERGAN Take 1 Tab by mouth every eight (8) hours as needed for Nausea. terbinafine HCl 250 mg tablet Commonly known as:  LAMISIL Take 1 Tab by mouth daily. X 12 weeks. traZODone 100 mg tablet Commonly known as:  Dominique Feldman Take 100 mg by mouth nightly. ZOFRAN ODT 8 mg disintegrating tablet Generic drug:  ondansetron Take 8 mg by mouth every eight (8) hours as needed for Nausea. Prescriptions Sent to Pharmacy Refills  
 gabapentin (NEURONTIN) 400 mg capsule 3 Sig: Take 2 Caps by mouth three (3) times daily as needed. Class: Normal  
 Pharmacy: [address] #: [phone]  Route: Oral  
 DULoxetine (CYMBALTA) 60 mg capsule 2  
 Sig: Take 1 Cap by mouth two (2) times a day. Class: Normal  
 Pharmacy: [id number] [place]  #: [phone] Route: Oral  
  
We Performed the Following REFERRAL TO NEUROLOGY [LCN40 Custom] Comments:  
 Please evaluate patient for memory changes, per request of psychiatrist.  
  
Follow-up Instructions Return if symptoms worsen or fail to improve. To-Do List   
 03/07/2017 Imaging:  MRI KNEE LT WO CONT Referral Information Referral ID Referred By Referred To  
  
 [id number] [facility], [address] MD [address] [place], [address] [place] [address] Phone: [phone] Fax: [phone] Visits Status Start Date End Date 1 New Request 3/6/17 3/6/18 If your referral has a status of pending review or denied, additional information will be sent to support the outcome of this decision. Patient Instructions Knee Pain or Injury: Care Instructions Your Care Instructions Injuries are a common cause of knee problems. Sudden (acute) injuries may be caused by a direct blow to the knee. They can also be caused by abnormal twisting, bending, or falling on the knee. Pain, bruising, or swelling may be severe, and may start within minutes of the injury. Overuse is another cause of knee pain. Other causes are climbing stairs, kneeling, and other activities that use the knee. Everyday wear and tear, especially as you get older, also can cause knee pain. Rest, along with home treatment, often relieves pain and allows your knee to heal. If you have a serious knee injury, you may need tests and treatment. Follow-up care is a key part of your treatment and safety. Be sure to make and go to all appointments, and call your doctor if you are having problems. It's also a good idea to know your test results and keep a list of the medicines you take. How can you care for yourself at home? · Be safe with medicines. Read and follow all instructions on the label. ¨ If the doctor gave you a prescription medicine for pain, take it as prescribed. ¨ If you are not taking a prescription pain medicine, ask your doctor if you can take an over-the-counter medicine. · Rest and protect your knee. Take a break from any activity that may cause pain. · Put ice or a cold pack on your knee for 10 to 20 minutes at a time. Put a thin cloth between the ice and your skin. · Prop up a sore knee on a pillow when you ice it or anytime you sit or lie down for the next 3 days. Try to keep it above the level of your heart. This will help reduce swelling. · If your knee is not swollen, you can put moist heat, a heating pad, or a warm cloth on your knee. · If your doctor recommends an elastic bandage, sleeve, or other type of support for your knee, wear it as directed. · Follow your doctor's instructions about how much weight you can put on your leg. Use a cane, crutches, or a walker as instructed. · Follow your doctor's instructions about activity during your healing process. If you can do mild exercise, slowly increase your activity. · Reach and stay at a healthy weight. Extra weight can strain the joints, especially the knees and hips, and make the pain worse. Losing even a few pounds may help. When should you call for help? Call 911 anytime you think you may need emergency care. For example, call if: 
· You have symptoms of a blood clot in your lung (called a pulmonary embolism). These may include: 
¨ Sudden chest pain. ¨ Trouble breathing. ¨ Coughing up blood. Call your doctor now or seek immediate medical care if: 
· You have severe or increasing pain. · Your leg or foot turns cold or changes color. · You cannot stand or put weight on your knee. · Your knee looks twisted or bent out of shape. · You cannot move your knee. · You have signs of infection, such as: ¨ Increased pain, swelling, warmth, or redness. ¨ Red streaks leading from the knee. ¨ Pus draining from a place on your knee. ¨ A fever. · You have signs of a blood clot in your leg (called a deep vein thrombosis), such as: 
¨ Pain in your calf, back of the knee, thigh, or groin. ¨ Redness and swelling in your leg or groin. Watch closely for changes in your health, and be sure to contact your doctor if: 
· You have tingling, weakness, or numbness in your knee. · You have any new symptoms, such as swelling. · You have bruises from a knee injury that last longer than 2 weeks. · You do not get better as expected. Where can you learn more? Go to http://jonathon-margareth.info/. Enter K195 in the search box to learn more about \"Knee Pain or Injury: Care Instructions. \" Current as of: May 27, 2016 Content Version: 11.1 © 8564-0650 [employer]. Care instructions adapted under license by [employer] (which disclaims liability or warranty for this information). If you have questions about a medical condition or this instruction, always ask your healthcare professional. [place] [address] any warranty or liability for your use of this information. Introducing [facility]! Dear Abida Osorio: 
Thank you for requesting a [employer] account. Our records indicate that you already have an active [employer] account. You can access your account anytime at [email or web address] Did you know that you can access your hospital and ER discharge instructions at any time in [employer]? You can also review all of your test results from your hospital stay or ER visit. Additional Information If you have questions, please visit the Frequently Asked Questions section of the [employer] website at [email or web address]. Remember, [employer] is NOT to be used for urgent needs. For medical emergencies, dial 911. Now available from your iPhone and Android! Please provide this summary of care documentation to your next provider. Your primary care clinician is listed as [address]. If you have any questions after today's visit, please call [phone].

## 2017-03-06 NOTE — PROGRESS NOTES
Identified pt with two pt identifiers(name and ). Chief Complaint   Patient presents with    Depression    Medication Evaluation      Pt states DR. Guillermo Jennings would like pt t be seen by neurologist for memory impairment. Health Maintenance Due   Topic    BREAST CANCER SCRN MAMMOGRAM        Wt Readings from Last 3 Encounters:   17 220 lb (99.8 kg)   17 220 lb (99.8 kg)   17 220 lb (99.8 kg)     Temp Readings from Last 3 Encounters:   17 98.3 °F (36.8 °C) (Oral)   17 98.3 °F (36.8 °C)   17 98.2 °F (36.8 °C) (Oral)     BP Readings from Last 3 Encounters:   17 130/82   17 135/77   17 118/78     Pulse Readings from Last 3 Encounters:   17 76   17 89   17 78         Learning Assessment:  :     Learning Assessment 2016 2015 2015 1/3/2014   PRIMARY LEARNER Patient Patient Patient Patient   HIGHEST LEVEL OF EDUCATION - PRIMARY LEARNER  - - - 4 YEARS OF COLLEGE   BARRIERS PRIMARY LEARNER - NONE - COGNITIVE   CO-LEARNER CAREGIVER - No - -   PRIMARY LANGUAGE ENGLISH ENGLISH ENGLISH ENGLISH   LEARNER PREFERENCE PRIMARY DEMONSTRATION DEMONSTRATION VIDEOS READING     - - - VIDEOS   ANSWERED BY patient  patient patient patient   RELATIONSHIP SELF SELF SELF SELF           Abuse Screening:  :     Abuse Screening Questionnaire 2015 3/10/2014   Do you ever feel afraid of your partner? N N   Are you in a relationship with someone who physically or mentally threatens you? N N   Is it safe for you to go home? Y Y       Coordination of Care Questionnaire:  :     1) Have you been to an emergency room, urgent care clinic since your last visit? yes [facility] 3-3-17  Hospitalized since your last visit? no             2) Have you seen or consulted any other health care providers outside of [address] [place] [address] since your last visit? no  (Include any pap smears or colon screenings in this section.)    3) Do you have an Advance Directive on file? no  Are you interested in receiving information about Advance Directives? no    Patient is accompanied by self I have received verbal consent from Blancakenrick Sandoval to discuss any/all medical information while they are present in the room. Reviewed record in preparation for visit and have obtained necessary documentation. Medication reconciliation up to date and corrected with patient at this time.

## 2017-03-06 NOTE — PATIENT INSTRUCTIONS
Knee Pain or Injury: Care Instructions  Your Care Instructions    Injuries are a common cause of knee problems. Sudden (acute) injuries may be caused by a direct blow to the knee. They can also be caused by abnormal twisting, bending, or falling on the knee. Pain, bruising, or swelling may be severe, and may start within minutes of the injury. Overuse is another cause of knee pain. Other causes are climbing stairs, kneeling, and other activities that use the knee. Everyday wear and tear, especially as you get older, also can cause knee pain. Rest, along with home treatment, often relieves pain and allows your knee to heal. If you have a serious knee injury, you may need tests and treatment. Follow-up care is a key part of your treatment and safety. Be sure to make and go to all appointments, and call your doctor if you are having problems. It's also a good idea to know your test results and keep a list of the medicines you take. How can you care for yourself at home? · Be safe with medicines. Read and follow all instructions on the label. ¨ If the doctor gave you a prescription medicine for pain, take it as prescribed. ¨ If you are not taking a prescription pain medicine, ask your doctor if you can take an over-the-counter medicine. · Rest and protect your knee. Take a break from any activity that may cause pain. · Put ice or a cold pack on your knee for 10 to 20 minutes at a time. Put a thin cloth between the ice and your skin. · Prop up a sore knee on a pillow when you ice it or anytime you sit or lie down for the next 3 days. Try to keep it above the level of your heart. This will help reduce swelling. · If your knee is not swollen, you can put moist heat, a heating pad, or a warm cloth on your knee. · If your doctor recommends an elastic bandage, sleeve, or other type of support for your knee, wear it as directed.   · Follow your doctor's instructions about how much weight you can put on your leg. Use a cane, crutches, or a walker as instructed. · Follow your doctor's instructions about activity during your healing process. If you can do mild exercise, slowly increase your activity. · Reach and stay at a healthy weight. Extra weight can strain the joints, especially the knees and hips, and make the pain worse. Losing even a few pounds may help. When should you call for help? Call 911 anytime you think you may need emergency care. For example, call if:  · You have symptoms of a blood clot in your lung (called a pulmonary embolism). These may include:  ¨ Sudden chest pain. ¨ Trouble breathing. ¨ Coughing up blood. Call your doctor now or seek immediate medical care if:  · You have severe or increasing pain. · Your leg or foot turns cold or changes color. · You cannot stand or put weight on your knee. · Your knee looks twisted or bent out of shape. · You cannot move your knee. · You have signs of infection, such as:  ¨ Increased pain, swelling, warmth, or redness. ¨ Red streaks leading from the knee. ¨ Pus draining from a place on your knee. ¨ A fever. · You have signs of a blood clot in your leg (called a deep vein thrombosis), such as:  ¨ Pain in your calf, back of the knee, thigh, or groin. ¨ Redness and swelling in your leg or groin. Watch closely for changes in your health, and be sure to contact your doctor if:  · You have tingling, weakness, or numbness in your knee. · You have any new symptoms, such as swelling. · You have bruises from a knee injury that last longer than 2 weeks. · You do not get better as expected. Where can you learn more? Go to http://jonathon-margareth.info/. Enter K195 in the search box to learn more about \"Knee Pain or Injury: Care Instructions. \"  Current as of: May 27, 2016  Content Version: 11.1  © 9531-4782 [employer].  Care instructions adapted under license by [employer] (which disclaims liability or warranty for this information). If you have questions about a medical condition or this instruction, always ask your healthcare professional. [place] [address] any warranty or liability for your use of this information.

## 2017-03-13 ENCOUNTER — TELEPHONE (OUTPATIENT)
Dept: FAMILY MEDICINE CLINIC | Age: 57
End: 2017-03-13

## 2017-03-13 NOTE — TELEPHONE ENCOUNTER
See note below. advised pt that Dr. Claudio Neal will need to order any changes to MRI since he was the ordering physician. Pt understood. Also advised pt she may need to see ortho and she could go to free [facility] to start [facility] process since she does not have insurance at this time.

## 2017-03-13 NOTE — TELEPHONE ENCOUNTER
Stan Treadwell requesting MRI order change:   MRI scheduled for 3/15/17      L Knee to Ankle    R patella     L shoulder (r/o torn rotator cuff)      Best contact: [phone]    Thanks.

## 2017-04-10 ENCOUNTER — HOSPITAL ENCOUNTER (OUTPATIENT)
Dept: GENERAL RADIOLOGY | Age: 57
Discharge: HOME | End: 2017-04-10
Attending: FAMILY MEDICINE
Payer: MEDICARE

## 2017-04-10 DIAGNOSIS — M25.512 LEFT SHOULDER PAIN: ICD-10-CM

## 2017-04-10 PROCEDURE — 73030 X-RAY EXAM OF SHOULDER: CPT

## 2017-06-09 ENCOUNTER — HOSPITAL ENCOUNTER (OUTPATIENT)
Dept: PHYSICAL THERAPY | Age: 57
Discharge: HOME | End: 2017-06-09
Payer: MEDICARE

## 2017-06-09 VITALS
HEIGHT: 65 IN | HEART RATE: 68 BPM | RESPIRATION RATE: 18 BRPM | SYSTOLIC BLOOD PRESSURE: 118 MMHG | WEIGHT: 215 LBS | DIASTOLIC BLOOD PRESSURE: 62 MMHG | BODY MASS INDEX: 35.82 KG/M2

## 2017-06-09 PROCEDURE — 97162 PT EVAL MOD COMPLEX 30 MIN: CPT

## 2017-06-09 PROCEDURE — G8992 OTHER PT/OT  D/C STATUS: HCPCS

## 2017-06-09 PROCEDURE — G8990 OTHER PT/OT CURRENT STATUS: HCPCS

## 2017-06-09 PROCEDURE — G8991 OTHER PT/OT GOAL STATUS: HCPCS

## 2017-06-09 NOTE — PROGRESS NOTES
[address]  [facility]  [address] [facility]  [address]  [place] [address]      INITIAL EVALUATION    NAME: Amelia Sher AGE: 64 y.o. GENDER: female  DATE: 2017  REFERRING PHYSICIAN: Yash Torres MD  HISTORY AND BACKGROUND: Patient is a 64year old female with PMHx as documented below who is referred to this clinic by PCP for the evaluation and treatment of LE lymphedema. Primary Diagnosis:  · Bilateral LE lymphedema with lipdema component (I89.0)  Other Treatment Diagnoses: Morbid Obesity (E66.01)    Date of Onset:  original onset, referred to lymphedem a clinic 2014 order for assessment of lymphedema. Returned to clinic 2016 and was fit with compression garments that she obtained from the [facility]  Present Symptoms and Functional Limitations: LE swelling that causes limbs to feel heavy, and at times, makes walking difficult    Lower Extremity Functional Scale: 56%  Past Medical History:   Past Medical History:   Diagnosis Date    Acid reflux 2011    Anemia     Burn     Chronic pain     Followed by Dr. Geremias Lynn disorder 2012    Fibromyalgia     HNP (herniated nucleus pulposus with myelopathy), thoracic     Kidney calculi     Lymphedema     Memory loss, short term     MI (myocardial infarction) ([facility] [address]     Mixed hyperlipidemia 4/3/2013    MVP (mitral valve prolapse)     Neurological disorder     TIA L hemiparesis    PCOS (polycystic ovarian syndrome)     PTSD (post-traumatic stress disorder)     Scoliosis     Scoliosis     Stroke [facility])     2014    Vitamin B 12 deficiency      Past Surgical History:   Procedure Laterality Date    CARDIAC SURG PROCEDURE UNLIST      heart attack, mitral valve prolapse    HX  SECTION      , PCOS    HX CHOLECYSTECTOMY  99    [facility]: open gastric bypass, naseem,  umb hernia repair, tr. vagotomy    HX GASTRIC BYPASS  4/2/99    HX ORTHOPAEDIC  4514,8159,    bunion surgery, heel spur surgery     Current Medications:    Current Outpatient Prescriptions   Medication Sig    gabapentin (NEURONTIN) 400 mg capsule Take 2 Caps by mouth three (3) times daily as needed.  DULoxetine (CYMBALTA) 60 mg capsule Take 1 Cap by mouth two (2) times a day.  oxyMORphone (OPANA) 10 mg tab tablet Take 10 mg by mouth every six (6) hours as needed for Pain.  promethazine (PHENERGAN) 12.5 mg tablet Take 1 Tab by mouth every eight (8) hours as needed for Nausea.  tapentadol (NUCYNTA ER) 150 mg Tb12 Take  by mouth.  traZODone (DESYREL) 100 mg tablet Take 100 mg by mouth nightly.  AMITRIPTYLINE HCL (AMITRIPTYLINE PO) Take  by mouth. Indications: unknown dosage, prescribed by her Psychiatrist    terbinafine HCl (LAMISIL) 250 mg tablet Take 1 Tab by mouth daily. X 12 weeks.  hydrOXYzine HCl (ATARAX) 25 mg tablet Take 1 Tab by mouth three (3) times daily as needed for Itching.  ondansetron (ZOFRAN ODT) 8 mg disintegrating tablet Take 8 mg by mouth every eight (8) hours as needed for Nausea.  dextroamphetamine-amphetamine (ADDERALL) 10 mg tablet Take 10 mg by mouth two (2) times a day.  CYCLOBENZAPRINE HCL (FLEXERIL PO) Take 10 mg by mouth Before breakfast, lunch, and dinner.  omeprazole (PRILOSEC) 40 mg capsule Take 1 Cap by mouth daily for 90 days.  albuterol (PROAIR HFA) 90 mcg/actuation inhaler Take 1 Puff by inhalation every four (4) hours as needed for Wheezing or Shortness of Breath. No current facility-administered medications for this encounter. Allergies:    Allergies   Allergen Reactions    Bee Sting [Sting, Bee] Anaphylaxis    Morphine Other (comments)     Severe stomach cramps     Vicodin [Hydrocodone-Acetaminophen] Hives      Prior Level of Function/Social/Work History/Personal factors and/or comorbidities impacting plan of care: patient is on disability; she is . Living Situation: private residence with spouse who is also on disability    Trainable Caregiver?: self  Self-care/ADLs: Indep    Mobility: Indep     Previous Therapy:  2014 received a pair of compression pantyhose through the [facility] and then in 2016, received a pair of Juzo thigh length 3512 OTS compression stockings and 2 pairs of the [employer] Com[employer] through the [address] [place] [address] Road:   Patient arrives to PT reporting she is trying to walk more and is losing weight. She says her compression stockings are losing effectiveness, though she still wears them at times,  She says she never received the Juzo 3512 thigh length compression stockings or the [employer] CompreShort garments that were ordered for her in 2016. A call was made to [employer] where they were ordered from, and the vendor verified tracking and delivery confirmation that confirms that they were delivered the patient;s address  Patients goals for therapy: \"I need new compression garments. \"    OBJECTIVE DATA SUMMARY:   EXAMINATION/PRESENTATION/DECISION MAKING:   Pain:0/10           Self-Care and ADLs: Indep  Skin and Tissue Assessment:  Dermal Status:Intact.     Texture/Consistency:Tissue on thighs is very loose with cottage cheese appearance of fat  Pigmentation/Color Change: LE normal  Anomalies:none  Circulatory:DP pulses palpable b/l 3+  Nails:  ( x)  Normal  ( )  Fungus  Height:  Height: 5' 5\" (165.1 cm)  Weight:  Weight: 97.5 kg (215 lb)   BMI:  BMI (calculated): 35.8  (36 or greater: adversely affecting lymphedema)  Volumetric Measurements:   Right:  14,267 mL Left:  15,564 mL   % Difference: 9.09%    (See scanned graph)  Range of Motion: LE and UE ROM is WNL  Strength: UE and LE MMT is grossly 5/5  Sensation:  LE myotomes are intact to light touch  Mobility: independent  Gait:Independent  Safety: Patient is A & O x 4  Precautions:  Standard lymphedema precautions to include avoiding blood pressure readings, injections and IVs or other procedures/acts that could lead to broken skin on affected area, and avoiding excessive heat, resistive activity or altitude without compression garment    Functional Measure: In compliance with CMSs Claims Based Outcome Reporting, the following G-code set was chosen for this patient based on their primary functional limitation being treated: The outcome measure chosen to determine the severity of the functional limitation was the Lymphedema Life Impact Scale with a score of 38/68 which was correlated with the impairment scale. ? Other PT/OT Primary Functional Limitations:     - CURRENT STATUS: CK - 40%-59% impaired, limited or restricted    - GOAL STATUS: CK - 40%-59% impaired, limited or restricted    - D/C STATUS:  CK - 40%-59% impaired, limited or restricted       Physical Therapy Evaluation Charge Determination   History Examination Presentation Decision-Making   MEDIUM  Complexity : 1-2 comorbidities / personal factors will impact the outcome/ POC  MEDIUM Complexity : 3 Standardized tests and measures addressing body structure, function, activity limitation and / or participation in recreation  LOW Complexity : Stable, uncomplicated  LOW Complexity : FOTO score of       Based on the above components, the patient evaluation is determined to be of the following complexity level: LOW     Evaluation Time: 1:25 - 2:00    TREATMENT PROVIDED:   Patient reports she cannot afford to pay for any compression garments out of pocket at this time after it was explained to her that she is no longer eligible to access the [facility], as she has done this in the past, and it has a 'once per lifetime' policy.   I educated her on the [facility], explaining that she could apply for financial assistance to pay for compression garments, though she would need to pay the $12 application fee, and again, she said she cannot do this due to financial limitation. She denies ever applying to the [facility] in 2016, though there is a signed copy of her application in the paper chart. Further, she denies every receiving the compression garments that were ordered for her in 2016, though the vendor confirmed delivery at the patient's address though tracking information on file at [employer]. Patient declines further PT.     ASSESSMENT:   Yo Giles is a 64 y.o. female who presents with stage 1 mild b/l LE lymphedema with lipedema appearance. Patient also c/o intermittent swelling in her arms upon walking that resolves about 1 hour after being up. She has previously been provided with thigh length medical grade compression stockings and compression bike shorts by the [facility] in 2014 and 2016, but arrives today not wearing them, and denying she ever received them, though delivery was confirmed with tracking information provided by the vendor. Patient would benefit from replacement compression garments, but she is not eligible to access the [facility] again, and she reports she cannot afford to pay for them, nor can she afford the application fee to other benevolent funds that she may be eligible for. At this point, she appears motivated to continue to lose weight, which will be helpful in managing her lymphedema, and her skin has good elasticity and is intact on her LEs. No further medical care is necessary at this point, as she has previously been instructed in exercises for lymphedema management; is knowledgeable about skin care; and is not able to obtain compression garments due to reported financial limitation. PLAN OF CARE:   Discharge    Patient has participated in goal setting and agrees to work toward plan of care. Patient was instructed to call if questions or concerns arise.     Thank you for this referral.  Katrina Bennett, PT, CLT   Time Calculation: 35 mins    TREATMENT PLAN EFFECTIVE DATES:   6/9/2017 TO 7/9/2017  I have read the above plan of care for Merlyn Nunez. I certify the above prescribed services are required by this patient and are medically necessary.   The above plan of care has been developed in conjunction with the lymphedema/physical therapist.       Physician Signature: ____________________________________Date:______________

## 2017-06-12 DIAGNOSIS — G89.29 CHRONIC MIDLINE LOW BACK PAIN WITHOUT SCIATICA: ICD-10-CM

## 2017-06-12 DIAGNOSIS — M54.50 CHRONIC MIDLINE LOW BACK PAIN WITHOUT SCIATICA: ICD-10-CM

## 2017-06-12 RX ORDER — DULOXETIN HYDROCHLORIDE 60 MG/1
CAPSULE, DELAYED RELEASE ORAL
Qty: 60 CAP | Refills: 0 | Status: SHIPPED | OUTPATIENT
Start: 2017-06-12 | End: 2017-07-07 | Stop reason: SDUPTHER

## 2017-06-27 ENCOUNTER — OFFICE VISIT (OUTPATIENT)
Dept: OBGYN CLINIC | Age: 57
End: 2017-06-27

## 2017-06-27 VITALS
HEIGHT: 65 IN | SYSTOLIC BLOOD PRESSURE: 132 MMHG | WEIGHT: 215 LBS | BODY MASS INDEX: 35.82 KG/M2 | DIASTOLIC BLOOD PRESSURE: 80 MMHG

## 2017-06-27 DIAGNOSIS — N94.10 DYSPAREUNIA IN FEMALE: Primary | ICD-10-CM

## 2017-06-27 RX ORDER — ALPRAZOLAM 1 MG/1
1.5 TABLET ORAL
COMMUNITY
End: 2018-06-26 | Stop reason: SDUPTHER

## 2017-06-27 RX ORDER — OXYCODONE HYDROCHLORIDE 20 MG/1
20 TABLET ORAL
COMMUNITY
End: 2018-09-24

## 2017-06-27 NOTE — MR AVS SNAPSHOT
Visit Information Date & Time Provider Department Dept. Phone Encounter #  
 6/27/2017  9:20  S Klever Suarez MD Dylan Coello [phone] [id number] Your Appointments 7/3/2017 11:00 AM  
Office Visit with MAKEDA Martinez [address] [facility]) Appt Note: np - referred by Dr. Morro Najera - full skin exam - spots on arms. .[address] [facility] [address] [place] [address]  
  
    
 8/3/2017  2:20 PM  
New Patient with Walda Lesch, PsyD [address] ([facility]) Appt Note: re evaluation for testing/ Mario Liang [address] [facility] [address] [facility] [address] [phone]  
  
   
 [address] [place] [id number] [address] Upcoming Health Maintenance Date Due  
 BREAST CANCER SCRN MAMMOGRAM 12/1/2016 COLONOSCOPY 11/28/2017 INFLUENZA AGE 9 TO ADULT 8/1/2017 PAP AKA CERVICAL CYTOLOGY 11/19/2018 Allergies as of 6/27/2017  Review Complete On: 6/27/2017 By: Higinio Toney Severity Noted Reaction Type Reactions Bee Sting [Sting, Bee]  03/12/2012    Anaphylaxis Morphine  02/04/2016    Other (comments) Severe stomach cramps Vicodin [Hydrocodone-acetaminophen]    Hives Current Immunizations  Reviewed on 6/30/2015 Name Date Influenza Vaccine Kit Payer) 10/21/2016 Influenza Vaccine PF 11/13/2013 Influenza Vaccine Whole 10/1/2012 Tdap 6/30/2015  3:59 PM  
  
 Not reviewed this visit Vitals BP Height(growth percentile) Weight(growth percentile) LMP BMI OB Status 132/80 5' 5\" (1.651 m) 215 lb (97.5 kg) 07/15/2011 35.78 kg/m2 Postmenopausal  
 Smoking Status Never Smoker Vitals History BMI and BSA Data Body Mass Index Body Surface Area  35.78 kg/m 2 2.11 m 2  
  
  
 Preferred Pharmacy Pharmacy Name Phone [facility] [address] [place] [address] Teresa [phone] Your Updated Medication List  
  
   
This list is accurate as of: 6/27/17  9:55 AM.  Always use your most recent med list.  
  
  
  
  
 albuterol 90 mcg/actuation inhaler Commonly known as:  PROAIR HFA Take 1 Puff by inhalation every four (4) hours as needed for Wheezing or Shortness of Breath. AMITRIPTYLINE PO Take  by mouth. Indications: unknown dosage, prescribed by her Psychiatrist  
  
 dextroamphetamine-amphetamine 10 mg tablet Commonly known as:  ADDERALL Take 10 mg by mouth two (2) times a day. DULoxetine 60 mg capsule Commonly known as:  CYMBALTA TAKE ONE CAPSULE BY MOUTH TWICE DAILY FLEXERIL PO Take 10 mg by mouth Before breakfast, lunch, and dinner. gabapentin 400 mg capsule Commonly known as:  NEURONTIN Take 2 Caps by mouth three (3) times daily as needed. hydrOXYzine HCl 25 mg tablet Commonly known as:  ATARAX Take 1 Tab by mouth three (3) times daily as needed for Itching. NUCYNTA  mg Tb12 Generic drug:  tapentadol Take  by mouth. omeprazole 40 mg capsule Commonly known as:  PRILOSEC Take 1 Cap by mouth daily for 90 days. oxyCODONE IR 20 mg immediate release tablet Commonly known as:  Kade Greer Take  by mouth. oxyMORphone 10 mg Tab tablet Commonly known as:  OPANA Take 10 mg by mouth every six (6) hours as needed for Pain. promethazine 12.5 mg tablet Commonly known as:  PHENERGAN Take 1 Tab by mouth every eight (8) hours as needed for Nausea. terbinafine HCl 250 mg tablet Commonly known as:  LAMISIL Take 1 Tab by mouth daily. X 12 weeks. traZODone 100 mg tablet Commonly known as:  Ovidio Mallet Take 100 mg by mouth nightly. XANAX 1 mg tablet Generic drug:  ALPRAZolam  
Take  by mouth. ZOFRAN ODT 8 mg disintegrating tablet Generic drug:  ondansetron Take 8 mg by mouth every eight (8) hours as needed for Nausea. Patient Instructions Painful Sex: Care Instructions Your Care Instructions Painful sex can be caused by many things. You may have an injury, an infection, or a growth in your vagina. Or maybe you have muscle spasms. In some cases, the pain is caused by another medical condition, such as a spinal problem. Some medicines can cause dryness in the vagina. And as a woman gets older, her vagina gets drier. It may also narrow, shorten, and get stiffer. This dryness can make sex painful. Talk to your doctor about what might be causing your painful sex. Treatment may help. Follow-up care is a key part of your treatment and safety. Be sure to make and go to all appointments, and call your doctor if you are having problems. It's also a good idea to know your test results and keep a list of the medicines you take. How can you care for yourself at home? · Use a vaginal lubricant during sex. Examples are Astroglide, K-Y Jelly, and Wet Gel Lubricant. · Increase the time you and your partner spend touching each other before sex. This is called foreplay. · Try different positions for sex to find the most comfortable ones. · Ask your doctor about exercises to strengthen and relax your pelvic muscles. · Before sex, take a warm bath. This can relax you and reduce anxiety. · If your doctor prescribes any medicines, take them exactly as prescribed. Call your doctor if you think you are having a problem with your medicine. When should you call for help? Call 911 anytime you think you may need emergency care. For example, call if: 
· You passed out (lost consciousness). · You have sudden, severe pain in your belly or pelvis. Call your doctor now or seek immediate medical care if: 
· You have new belly or pelvic pain. · You have a fever and pelvic pain or vaginal discharge. Watch closely for changes in your health, and be sure to contact your doctor if: 
· Your pain during sex increases. · You do not get better as expected. Where can you learn more? Go to http://jonathon-margareth.info/. Enter V787 in the search box to learn more about \"Painful Sex: Care Instructions. \" Current as of: October 13, 2016 Content Version: 11.3 © 7517-4751 [employer]. Care instructions adapted under license by [employer] (which disclaims liability or warranty for this information). If you have questions about a medical condition or this instruction, always ask your healthcare professional. [address] any warranty or liability for your use of this information. Introducing [facility]! Dear Maral Topete: 
Thank you for requesting a [employer] account. Our records indicate that you already have an active [employer] account. You can access your account anytime at [email or web address] Phone Did you know that you can access your hospital and ER discharge instructions at any time in [employer]? You can also review all of your test results from your hospital stay or ER visit. Additional Information If you have questions, please visit the Frequently Asked Questions section of the [employer] website at [email or web address] Remember, [employer] is NOT to be used for urgent needs. For medical emergencies, dial 911. Now available from your iPhone and Android! Please provide this summary of care documentation to your next provider. Your primary care clinician is listed as [address]. If you have any questions after today's visit, please call [phone].

## 2017-06-27 NOTE — PATIENT INSTRUCTIONS
Painful Sex: Care Instructions  Your Care Instructions  Painful sex can be caused by many things. You may have an injury, an infection, or a growth in your vagina. Or maybe you have muscle spasms. In some cases, the pain is caused by another medical condition, such as a spinal problem. Some medicines can cause dryness in the vagina. And as a woman gets older, her vagina gets drier. It may also narrow, shorten, and get stiffer. This dryness can make sex painful. Talk to your doctor about what might be causing your painful sex. Treatment may help. Follow-up care is a key part of your treatment and safety. Be sure to make and go to all appointments, and call your doctor if you are having problems. It's also a good idea to know your test results and keep a list of the medicines you take. How can you care for yourself at home? · Use a vaginal lubricant during sex. Examples are Astroglide, K-Y Jelly, and Wet Gel Lubricant. · Increase the time you and your partner spend touching each other before sex. This is called foreplay. · Try different positions for sex to find the most comfortable ones. · Ask your doctor about exercises to strengthen and relax your pelvic muscles. · Before sex, take a warm bath. This can relax you and reduce anxiety. · If your doctor prescribes any medicines, take them exactly as prescribed. Call your doctor if you think you are having a problem with your medicine. When should you call for help? Call 911 anytime you think you may need emergency care. For example, call if:  · You passed out (lost consciousness). · You have sudden, severe pain in your belly or pelvis. Call your doctor now or seek immediate medical care if:  · You have new belly or pelvic pain. · You have a fever and pelvic pain or vaginal discharge. Watch closely for changes in your health, and be sure to contact your doctor if:  · Your pain during sex increases. · You do not get better as expected.   Where can you learn more? Go to http://jonathon-margareth.info/. Enter D078 in the search box to learn more about \"Painful Sex: Care Instructions. \"  Current as of: October 13, 2016  Content Version: 11.3  © 2197-9594 20/20 [employer]. Care instructions adapted under license by [employer] (which disclaims liability or warranty for this information). If you have questions about a medical condition or this instruction, always ask your healthcare professional. [place] [address] any warranty or liability for your use of this information.
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
## 2017-06-29 LAB
C TRACH RRNA SPEC QL NAA+PROBE: NEGATIVE
N GONORRHOEA RRNA SPEC QL NAA+PROBE: NEGATIVE

## 2017-07-03 ENCOUNTER — OFFICE VISIT (OUTPATIENT)
Dept: DERMATOLOGY | Facility: AMBULATORY SURGERY CENTER | Age: 57
End: 2017-07-03

## 2017-07-03 ENCOUNTER — HOSPITAL ENCOUNTER (OUTPATIENT)
Dept: LAB | Age: 57
Discharge: HOME | End: 2017-07-03

## 2017-07-03 VITALS
DIASTOLIC BLOOD PRESSURE: 74 MMHG | OXYGEN SATURATION: 99 % | TEMPERATURE: 98 F | SYSTOLIC BLOOD PRESSURE: 123 MMHG | HEIGHT: 65 IN | BODY MASS INDEX: 35.82 KG/M2 | HEART RATE: 81 BPM | WEIGHT: 215 LBS

## 2017-07-03 DIAGNOSIS — L81.4 LENTIGINES: ICD-10-CM

## 2017-07-03 DIAGNOSIS — D48.5 NEOPLASM OF UNCERTAIN BEHAVIOR OF SKIN OF UPPER ARM: Primary | ICD-10-CM

## 2017-07-03 DIAGNOSIS — D18.01 CHERRY ANGIOMA: ICD-10-CM

## 2017-07-03 DIAGNOSIS — D22.9 MULTIPLE BENIGN NEVI: ICD-10-CM

## 2017-07-03 DIAGNOSIS — D23.9 DERMATOFIBROMA: ICD-10-CM

## 2017-07-03 DIAGNOSIS — L82.1 SEBORRHEIC KERATOSES: ICD-10-CM

## 2017-07-03 RX ORDER — FLUOXETINE 10 MG/1
CAPSULE ORAL DAILY
COMMUNITY
End: 2018-01-22
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
## 2017-07-06 ENCOUNTER — TELEPHONE (OUTPATIENT)
Dept: DERMATOLOGY | Facility: AMBULATORY SURGERY CENTER | Age: 57
End: 2017-07-06

## 2017-07-06 NOTE — PROGRESS NOTES
I spoke with the patient and she is aware of the diagnosis of basal cell carcinoma on the left upper arm. She states she is healing well. She understands that this was curetted at her visit therefore no further treatment is needed at this time. I encouraged  a 6 months to 1 year follow-up visit.

## 2017-07-12 ENCOUNTER — TELEPHONE (OUTPATIENT)
Dept: OBGYN CLINIC | Age: 57
End: 2017-07-12

## 2017-07-12 NOTE — TELEPHONE ENCOUNTER
Rodolfo Trinh PT to see if patient art'd an appt. And she has not. The  will call patient to set one up.

## 2017-08-03 ENCOUNTER — OFFICE VISIT (OUTPATIENT)
Dept: NEUROLOGY | Age: 57
End: 2017-08-03

## 2017-08-03 DIAGNOSIS — G45.4 TRANSIENT GLOBAL AMNESIA: ICD-10-CM

## 2017-08-03 DIAGNOSIS — R41.3 SHORT-TERM MEMORY LOSS: Primary | ICD-10-CM

## 2017-08-03 DIAGNOSIS — F32.A ANXIETY AND DEPRESSION: ICD-10-CM

## 2017-08-03 DIAGNOSIS — F43.10 PTSD (POST-TRAUMATIC STRESS DISORDER): ICD-10-CM

## 2017-08-03 DIAGNOSIS — F41.9 ANXIETY AND DEPRESSION: ICD-10-CM

## 2017-08-03 DIAGNOSIS — F04: ICD-10-CM

## 2017-08-03 DIAGNOSIS — R41.840 POOR CONCENTRATION: ICD-10-CM

## 2017-08-03 DIAGNOSIS — R47.89 WORD FINDING DIFFICULTY: ICD-10-CM
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
## 2017-08-21 ENCOUNTER — OFFICE VISIT (OUTPATIENT)
Dept: NEUROLOGY | Age: 57
End: 2017-08-21

## 2017-08-21 DIAGNOSIS — F43.10 PTSD (POST-TRAUMATIC STRESS DISORDER): ICD-10-CM

## 2017-08-21 DIAGNOSIS — F41.9 SEVERE ANXIETY: ICD-10-CM

## 2017-08-21 DIAGNOSIS — F33.2 SEVERE RECURRENT MAJOR DEPRESSION WITHOUT PSYCHOTIC FEATURES (HCC): ICD-10-CM

## 2017-08-21 DIAGNOSIS — F90.0 ATTENTION DEFICIT HYPERACTIVITY DISORDER (ADHD), INATTENTIVE TYPE, MILD: Chronic | ICD-10-CM

## 2017-08-21 DIAGNOSIS — G31.84 MILD COGNITIVE IMPAIRMENT: Primary | ICD-10-CM

## 2017-08-23 NOTE — PROGRESS NOTES
[address] [facility],[address]Th [address]  [address]   [place] [address] [facility] [place] [phone] Office   [phone] Fax      Neuropsychological Evaluation Report      Referral:  Dr. Gaby Jay, Dr. Sandra Butler is a 64 y.o. right handed   female who was unaccompanied to the initial clinical interview on 8/4/17. Please refer to her medical records for details pertaining to her history. [facility] was down when I saw her so I am copying my notes here: Ms. Lynne Matson is a right handed   female who was unaccompanied to the initial neuropych consult visit. She completed almost two years of college without history of previously diagnosed LD and/or receipt of special education services. She is not currently working. In 2009, she lost her mom, stepfather, father, and grandparents all passed away. Her world ended. Her grandparents were everything in her life. She has been on disability services initially for chronic pain. The last thing she remembers is when her grandmother passed away in her arms. She last remembers them putting her body in taking her away. She literally has no memory of any events between [id number]. 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 She has no memory of any of this. No memory of essentially any event until 2015. Feels like she woke up around then. Her son says that the whole time he was waiting for that [facility] to come back. Since she has come off of a lot of medication she was taking. Currently, she still is not back to baseline and has progressive short term memory problems. She asks the same question over and over. Starts tasks and does not complete. Spouse said to tell me that she asked him the same question over and over on the way to [place] and back. She does not recall that question today.   She drives but only within a limited radius. Has gotten lost multiple times, and got lost while driving here today. She does not need reminders for medications. She is not allowed to touch the checkbook, because they were overdrawn and things were getting shut off when she was in charge of it. Spouse writes everything for her on a calendar on the fridge but she forgets to look at the calendar. He keeps writing lists and things for her and even then she forgets. She is losing words in conversation. She had a hard attack in 1994 and a stroke in 2015. From 2015 to now, feels like memory issues are progressively getting worse. She does not sleep unless she takes her Xanax. She puts stuff on the stove and forgets that it is there. She manages to make a lot of meals. She has a discord with the vacuum as she only does half the house and stops and doesnt know why. Moodwise, these days, she is on antidepressant and another medication was recently added, but doesnt know what that was. Mood is good now, with that extra medication. Before, she was crying all the time. Memory not better in the past two weeks. She did try to kill herself September 8th of last year. She ODd on every pill she had. Spouse found her. Spouse and daughter called 911 and woke up tied to bed with tube down her throat in ICU. She has not attempted before. At the time, spouse and patient were . Boyfriend backed out of something and she left him there. She was upset that he never called. Son and DIL have not spoken to her in two years because of her emotional problems. Lots going on. She has been seeing a Psychiatrist since 2014. Saw Dr. Susie Muñoz and prior to that was seeing [facility]. She is now getting her meds from PCP. No talk therapy right now. Has seen three counselors in the past.  She wants to know how to fix this. Appetite is fine. No changes in sense of smell. Cant taste anything.   Balance is terrible, and she has falls often. She has bad knees. She did lose 80 pounds so far. Does not drink alcohol, does not smoke. No history of psychosis. Once her grandmother passed away she was hearing music and its gone and doesnt hear it anymore. Her father had AD and so did her grandmother. No current legal troubles. She crochets and now forgets patterns and the movements associated with crocheting. Keeps losing count. She has gone through menopause and has no libido. She is scheduled to see OB soon regarding this. No previous neuropsych testing. Neuropsychological Mental Status Exam (NMSE):  Historian: Good  Praxis: No UE apraxia  R/L Orientation: Intact to self and to other  Dress: within normal limits   Weight: Overweight   Appearance/Hygiene: within normal limits   Gait: within normal limits   Assistive Devices: Glasses  Mood: within normal limits   Affect: within normal limits   Comprehension: within normal limits in general but she repeats herself three times during initial visit  Thought Process: within normal limits   Expressive Language: Mild word finding difficulties, loses train of thought twice during initial visit  Receptive Language: within normal limits   Motor:  No cognitive or motor perseveration  ETOH: Denied  Tobacco: Denied  Illicit: Denied  SI/HI: See above  Psychosis: Denied  Insight: Within normal limits  Judgment: Within normal limits  Other Psych:      Past Medical History:   Diagnosis Date    Acid reflux 1/17/2011    Anemia     Burn     Shoulder, back and buttocks     CAD (coronary artery disease)     Chronic pain     Followed by Dr. Bonifacio Fitzgerald Dysthymic disorder 11/9/2012    Family history of skin cancer     Fibromyalgia     Herniated cervical disc     Hx of mammogram 2015    Negative per pt.  Harshil'd w/[facility] on 6/28/17    Kidney stone     Lymphedema     Memory loss, short term     MVP (mitral valve prolapse)     PCOS (polycystic ovarian syndrome)  PTSD (post-traumatic stress disorder)     Routine Papanicolaou smear 2015    Negative ; HPV Negative     Scoliosis     Stroke [facility])     2014    Sun-damaged skin     Sunburn, blistering     Tanning bed exposure     TIA on medication     TIA L hemiparesis - Was on diet pills    Vitamin B 12 deficiency        Past Surgical History:   Procedure Laterality Date    CARDIAC SURG PROCEDURE UNLIST      heart attack, mitral valve prolapse    HX  SECTION      , PCOS    HX GASTRIC BYPASS  1999    [facility]: open gastric bypass, naseem,  umb hernia repair, tr. vagotomy    HX ORTHOPAEDIC  [id number],    bunion surgery, heel spur surgery    HX TONSILLECTOMY      As a child        Allergies   Allergen Reactions    Bee Sting [Sting, Bee] Anaphylaxis    Morphine Other (comments)     Severe stomach cramps     Vicodin [Hydrocodone-Acetaminophen] Hives       Family History   Problem Relation Age of Onset    Hypertension Mother     Ovarian Cancer Mother      Spread to Throat and Lung     Diabetes Father     Stroke Father     Hypertension Father     Breast Cancer Sister     Breast Cancer Maternal Grandmother      Passed away from COPD - Former smoker     COPD Maternal Grandmother        Social History   Substance Use Topics    Smoking status: Never Smoker    Smokeless tobacco: Never Used      Comment: Never used vapor or e-cigs     Alcohol use No       Current Outpatient Prescriptions   Medication Sig Dispense Refill    DULoxetine (CYMBALTA) 60 mg capsule TAKE ONE CAPSULE BY MOUTH TWICE DAILY 60 Cap 2    FLUoxetine (PROZAC) 10 mg capsule Take  by mouth daily.  ALPRAZolam (XANAX) 1 mg tablet Take  by mouth.  oxyCODONE IR (ROXICODONE) 20 mg immediate release tablet Take  by mouth.  gabapentin (NEURONTIN) 400 mg capsule Take 2 Caps by mouth three (3) times daily as needed.  180 Cap 3    oxyMORphone (OPANA) 10 mg tab tablet Take 10 mg by mouth every six (6) hours as needed for Pain.  promethazine (PHENERGAN) 12.5 mg tablet Take 1 Tab by mouth every eight (8) hours as needed for Nausea. 12 Tab 0    tapentadol (NUCYNTA ER) 150 mg Tb12 Take  by mouth.  traZODone (DESYREL) 100 mg tablet Take 100 mg by mouth nightly.  AMITRIPTYLINE HCL (AMITRIPTYLINE PO) Take  by mouth. Indications: unknown dosage, prescribed by her Psychiatrist      terbinafine HCl (LAMISIL) 250 mg tablet Take 1 Tab by mouth daily. X 12 weeks. 90 Tab 0    hydrOXYzine HCl (ATARAX) 25 mg tablet Take 1 Tab by mouth three (3) times daily as needed for Itching. 30 Tab 2    ondansetron (ZOFRAN ODT) 8 mg disintegrating tablet Take 8 mg by mouth every eight (8) hours as needed for Nausea.  dextroamphetamine-amphetamine (ADDERALL) 10 mg tablet Take 10 mg by mouth two (2) times a day.  CYCLOBENZAPRINE HCL (FLEXERIL PO) Take 10 mg by mouth Before breakfast, lunch, and dinner.  omeprazole (PRILOSEC) 40 mg capsule Take 1 Cap by mouth daily for 90 days. 90 Cap 1    albuterol (PROAIR HFA) 90 mcg/actuation inhaler Take 1 Puff by inhalation every four (4) hours as needed for Wheezing or Shortness of Breath. 1 Inhaler 3         Plan:  Obtain authorization for testing from insurance company. Report to follow once testing, scoring, and interpretation completed. ? Organic based neurocognitive issues versus mood disorder or combination of same. ? Problems organic, functional, or both? This note will not be viewable in [address] Patient with no memory of any events from 8098-6020. Had a stroke in 2015. Since then, she has had a progressive decline in short term memory along with ongoing mood concerns. Lots of traumas in her life and chronic mood concerns. Will evaluate for vascular dementia in the context of PTSD related concerns.        Neuropsychological Test Results  Patient Testing 8/21/17 Report Completed 8/23/17  A Psychometrist Assisted w/ portions of this evaluation while under my direct  supervision    The following evaluation procedures/tests were administered:      Neuropsychologist Performed, Interpreted, & Reported:  Neuropsychological Mental Status Exam, Revised Memory & Behavior Checklist,  Mini Mental Status Exam, Clock Drawing Test, Anali-Melzack Pain Questionnaire, Test Of Premorbid Functioning, History Taking  & Clinical Interview With The Patient, Review Of Available Records. Psychometrist Administered under Neuropsychologist Supervision & Neuropsychologist Interpreted & Neuropsychologist Reported:  Verbal Fluency Tests, Mitchell & Mitchell - Revised, Trailmaking Test Parts A & B, Wechsler Adult Intelligence Scale - IV, Mane Continuous Performance Test - III, [place] Verbal Learning Test - II, Detailed Assessment of Post Traumatic Stress, Grooved Pegboard, Barrera Depression Inventory - II, Barrera Anxiety Inventory, Personality Assessment Inventory. Test Findings:  Test Findings:  Note:  The patients raw data have been compared with currently available norms which include demographic corrections for age, gender, and/or education. Sometimes, the patients scores are compared to demographically similar individuals as close to the patients age, education level, etc., as possible. \"Average\" is viewed as being +/- 1 standard deviation (SD) from the stated mean for a particular test score. \"Low average\" is viewed as being between 1 and 2 SD below the mean, and above average is viewed as being 1 and 2 SD above the mean. Scores falling in the borderline range (between 1-1/2 and 2 SD below the mean) are viewed with particular attention as to whether they are normal or abnormal neurocognitive test scores. Other methods of inference in analyzing the test data are also utilized, including the pattern and range of scores in the profile, bilateral motor functions, and the presence, if any, of pathognomonic signs.         Behaviorally, the patient was friendly and cooperative and appeared motivated to perform well during this examination. Within this context, the results of this evaluation are viewed as a valid reflection of the patients actual neurocognitive and emotional status. The patient's score of 28/30 on the Mini-Mental Status Exam was within normal limits. In this regard, she was not oriented to city. Recall for three words after a brief delay was 2/3 correct. Clock drawing was normal.        Her structured word list fluency, as assessed by the FAS Test, was within the average range with a T score of 48. Category fluency was within the below average range with a T score of 44. Confrontation naming ability, as assessed by the [facility] Test - Revised, was within the below average range at 55/60 correct (T = 44). This pattern of performance is not indicative of a patient who is at increased risk for day-to-day problems with verbal fluency or confrontation naming. The patient was administered the Mane Continuous Performance Test - III and review of the subscales within this instrument revealed numerous concerns for inattentiveness without impulsivity. This pattern of performance is indicative of a patient who is at increased risk for day-to-day problems with sustained visual attention/concentration. The patient is not showing problems with working memory capacity (23rd %ile) or processing speed (55th %ile) on the WAIS-IV. Her Verbal Comprehension Index score of .91 was within the average range  Her Perceptual Reasoning Index score of 77 was borderline. Perceptual reasoning is lower than expected based on an assessment estimating premorbid functioning. Other IQ domain scores remain normal.       The patient was administered the [address] Test  - II and generated a normal range and positive learning curve over five repeated auditory word list learning trials.   An interference trial was normal.  Free and cued, short and long delayed recall were all normal.  Recognition and forced recall were normal.  This pattern of performance is not indicative of a patient who is at increased risk for day-to-day problems with auditory learning and/or memory. Simple timed visual motor sequencing (Trailmaking Test Part A) was within the average range with a T score of 48. Her performance on a similar, but more complex task of timed visual motor sequencing (Trailmaking Test Part B) was within the average range with a T score of 45. She made zero sequencing errors on this latter test.   This pattern of performance is not indicative of a patient who is at increased risk for day-to-day problems with executive functioning. Fine motor dexterity was within the normal range bilaterally. This does not raise concern for a particularly lateralized brain dysfunction. The patient rated her current level of pain as \"2/5 - Discomforting\" on the Anali-Melzack Pain Questionnaire. She reported pain in her head, left shoulder, spine, left groin region, and left knee. Her Barrera Depression Inventory -II score of 36 was within the severely depressed range. Her Barrera Anxiety Inventory score of 19 reflected moderate anxiety. The patient's responses on the Personality Assessment Inventory were deemed valid for interpretation, though there are elements of a \"Cry for help\" pattern of responding. Within this context, there is support for severe PTSD, anxiety, and depression. Maladaptive behavior patterns aimed at controlling anxiety are present. Self-concept is harsh and negative. Notable day-to-day stress and turmoil is reported, and she reports having little to no social support. She reports ongoing thoughts of suicide and denied current plan or intent with me. Despite her recognition that numerous areas of her life are not going well at this time, her responses suggest considerable resistance to the idea that personal changes are needed. The patient also completed the Detailed Assessment of Post Traumatic Stress and there are elements of a \"cry for help\" pattern of responding. Within this context, she reported numerous traumatic experiences. Within this context, there is support for PTSD. This is a moderate to severe problem. In addition, she reports trauma specific dissociation and suicidality. This then presents as a more complex PTSD issue, which often requires more intensive psychological and/or pharmacological treatment. Impressions & Recommendations: This is a predominant normal range Neuropsychological Evaluation with respect to neurocognitive functioning. In this regard, the only impairment noted was for sustained visual attention. Otherwise, her mental status, verbal fluency, confrontation naming, working memory, processing speed, perceptual reasoning, verbal comprehension, executive functioning, and bilateral fine motor dexterity are normal.  From an emotional standpoint, there is severe PTSD with severe depression and anxiety. Her anxiety is significant to the degree whereby her ability to attend and to concentrate are compromised. In my opinion, this profile is inconsistent with dementia. It is also inconsistent with residua from CVA. Aside from a chronic ADHD- Inattentive type concern, this is a normal range Neurocognitive profile. Instead, the day-to-day problems and decline are secondary to profound emotional distress secondary to PTSD. I suggest intensive psychiatric treatment to include long term psychiatric medication management for anxiety and depression along with active participation in intensive psychotherapy. Her risk for self-harm remains elevated and needs to be monitored by her mental health care providers. I hope she is reassured that this is not a dementing condition.  Once mood improves, if attention continues to impact her functioning, then I would recommend consideration for an appropriate medication for attention if this is not medically contraindicated. I am not concerned about competency, day-to-day supervision, etc.  Baseline now established. Follow up prn. Clinical correlation is, of course, indicated. I will discuss these findings with the patient when she follows up with me in the near future. A follow up Neuropsychological Evaluation is indicated on a prn basis, especially if there are any cognitive and/or emotional changes. 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 If there is any additional information of which I am currently unaware, I would be more than happy to review it upon having it made available to me. Thank you for the opportunity to see this interesting individual.     Sincerely,       Hudson Nieves. Amy Davila, EdS    CC:  Dr. Kraig Gandhi    2 units -12790-  1.75 hours Record review. Review of history provided by patient. Review of collaborative information. Testing by Clinician. Review of raw data. Scoring. Report writing of individual tests administered by Clinician. Integration of individual tests administered by psychometrist (that were previously reported and billed under psychometry code below) with testing by clinician and review of records/history/collaborative information. Case Conceptualization, Report writing. Coordination Of Care. 4 units  -62120 - 3.75 hours Psychometrist test prep, administration, and scoring under clinician's direct supervision. Clinical interpretation of individual tests administered by psychometrist .  Clinician report of individual tests administered by psychometrist.    \"Unit\" is defined by CPT/National Guidelines (31 - 60 minutes).   Integral services including scoring of raw data, data interpretation, case conceptualization, report writing etcetera were initiated after the patient finished testing/raw data collected and was completed on the date the report was signed.

## 2017-08-30 ENCOUNTER — OFFICE VISIT (OUTPATIENT)
Dept: FAMILY MEDICINE CLINIC | Age: 57
End: 2017-08-30

## 2017-08-30 VITALS
SYSTOLIC BLOOD PRESSURE: 139 MMHG | TEMPERATURE: 98.3 F | BODY MASS INDEX: 35.65 KG/M2 | HEART RATE: 62 BPM | WEIGHT: 214 LBS | HEIGHT: 65 IN | OXYGEN SATURATION: 98 % | RESPIRATION RATE: 18 BRPM | DIASTOLIC BLOOD PRESSURE: 79 MMHG

## 2017-08-30 DIAGNOSIS — J01.00 ACUTE NON-RECURRENT MAXILLARY SINUSITIS: Primary | ICD-10-CM

## 2017-08-30 RX ORDER — AMOXICILLIN AND CLAVULANATE POTASSIUM 875; 125 MG/1; MG/1
1 TABLET, FILM COATED ORAL 2 TIMES DAILY
Qty: 20 TAB | Refills: 0 | Status: SHIPPED | OUTPATIENT
Start: 2017-08-30 | End: 2017-09-09

## 2017-08-30 NOTE — PROGRESS NOTES
HISTORY OF PRESENT ILLNESS  Bessie Banks is a 64 y.o. female. HPI  Pt presents with \"sore throat, headache, nasal congestion, and ear pain\"    Symptoms have been going on for about a month  Headache and nasal congestion  Thick nasal congestion, that will run at times. She has a pressure type headache in her sinuses. Fatigue, no energy  Sore throat  OTC: Sudafed and Tylenol  Fever: none  Review of Systems   Constitutional: Positive for malaise/fatigue. Negative for fever. HENT: Positive for congestion, ear pain and sore throat. Respiratory: Negative for cough. Gastrointestinal: Negative for abdominal pain, diarrhea and vomiting. Physical Exam   Constitutional: She is oriented to person, place, and time. She appears well-developed and well-nourished. HENT:   Head: Normocephalic and atraumatic. Right Ear: Hearing, tympanic membrane, external ear and ear canal normal.   Left Ear: Hearing, tympanic membrane, external ear and ear canal normal.   Nose: Mucosal edema and rhinorrhea present. Right sinus exhibits maxillary sinus tenderness. Left sinus exhibits maxillary sinus tenderness. Mouth/Throat: Posterior oropharyngeal erythema present. Neck: Normal range of motion. Neck supple. Cardiovascular: Normal rate, regular rhythm and normal heart sounds. Pulmonary/Chest: Effort normal and breath sounds normal.   Lymphadenopathy:     She has no cervical adenopathy. Neurological: She is alert and oriented to person, place, and time. Skin: Skin is warm and dry. Psychiatric: She has a normal mood and affect. Her behavior is normal.       ASSESSMENT and PLAN    ICD-10-CM ICD-9-CM    1. Acute non-recurrent maxillary sinusitis J01.00 461.0 amoxicillin-clavulanate (AUGMENTIN) 875-125 mg per tablet     Informed patient that I have sent medication to the pharmacy, and she should take as prescribed. Educated about taking with food, to decrease the risk of stomach upset.   Educated about staying well hydrated, by pushing fluids as much as possible. Pt informed to return to office with worsening of symptoms, or PRN with any questions or concerns. Pt verbalizes understanding of plan of care and denies further questions or concerns at this time.

## 2017-08-30 NOTE — PATIENT INSTRUCTIONS
Sinusitis: Care Instructions  Your Care Instructions    Sinusitis is an infection of the lining of the sinus cavities in your head. Sinusitis often follows a cold. It causes pain and pressure in your head and face. In most cases, sinusitis gets better on its own in 1 to 2 weeks. But some mild symptoms may last for several weeks. Sometimes antibiotics are needed. Follow-up care is a key part of your treatment and safety. Be sure to make and go to all appointments, and call your doctor if you are having problems. It's also a good idea to know your test results and keep a list of the medicines you take. How can you care for yourself at home? · Take an over-the-counter pain medicine, such as acetaminophen (Tylenol), ibuprofen (Advil, Motrin), or naproxen (Aleve). Read and follow all instructions on the label. · If the doctor prescribed antibiotics, take them as directed. Do not stop taking them just because you feel better. You need to take the full course of antibiotics. · Be careful when taking over-the-counter cold or flu medicines and Tylenol at the same time. Many of these medicines have acetaminophen, which is Tylenol. Read the labels to make sure that you are not taking more than the recommended dose. Too much acetaminophen (Tylenol) can be harmful. · Breathe warm, moist air from a steamy shower, a hot bath, or a sink filled with hot water. Avoid cold, dry air. Using a humidifier in your home may help. Follow the directions for cleaning the machine. · Use saline (saltwater) nasal washes to help keep your nasal passages open and wash out mucus and bacteria. You can buy saline nose drops at a grocery store or drugstore. Or you can make your own at home by adding 1 teaspoon of salt and 1 teaspoon of baking soda to 2 cups of distilled water. If you make your own, fill a bulb syringe with the solution, insert the tip into your nostril, and squeeze gently. Verlee Leader your nose.   · Put a hot, wet towel or a warm gel pack on your face 3 or 4 times a day for 5 to 10 minutes each time. · Try a decongestant nasal spray like oxymetazoline (Afrin). Do not use it for more than 3 days in a row. Using it for more than 3 days can make your congestion worse. When should you call for help? Call your doctor now or seek immediate medical care if:  · You have new or worse swelling or redness in your face or around your eyes. · You have a new or higher fever. Watch closely for changes in your health, and be sure to contact your doctor if:  · You have new or worse facial pain. · The mucus from your nose becomes thicker (like pus) or has new blood in it. · You are not getting better as expected. Where can you learn more? Go to http://jonathon-margareth.info/. Enter Y077 in the search box to learn more about \"Sinusitis: Care Instructions. \"  Current as of: July 29, 2016  Content Version: 11.3  © 3233-3781 [employer]. Care instructions adapted under license by [employer] (which disclaims liability or warranty for this information). If you have questions about a medical condition or this instruction, always ask your healthcare professional. [place] [address] any warranty or liability for your use of this information.

## 2017-08-30 NOTE — MR AVS SNAPSHOT
Visit Information Date & Time Provider Department Dept. Phone Encounter #  
 8/30/2017  3:30 PM Mercedes BeltranEthan [phone] [id number] Follow-up Instructions Return if symptoms worsen or fail to improve. Your Appointments 9/21/2017 11:40 AM  
Follow Up with Holland Shaver MD  
[facility]) Appt Note: [place] [facility] [place]  
 [address] [address] [phone]  
  
   
 [place] [address]  
  
    
 10/20/2017 11:40 AM  
Follow Up with Rolando Gomez PsyD [address] [facility] ([address]) Appt Note: office feedback/mm [place] [address] [id number] [phone]  
  
   
 [place] [address] [id number] [address] Upcoming Health Maintenance Date Due  
 BREAST CANCER SCRN MAMMOGRAM 12/1/2016 INFLUENZA AGE 9 TO ADULT 8/1/2017 COLONOSCOPY 11/28/2017 PAP AKA CERVICAL CYTOLOGY 11/19/2018 DTaP/Tdap/Td series (2 - Td) 6/30/2025 Allergies as of 8/30/2017  Review Complete On: 8/30/2017 By: Mercedes Beltran NP Severity Noted Reaction Type Reactions Bee Sting [Sting, Bee]  03/12/2012    Anaphylaxis Morphine  02/04/2016    Other (comments) Severe stomach cramps Vicodin [Hydrocodone-acetaminophen]    Hives Current Immunizations  Reviewed on 6/30/2015 Name Date Influenza Vaccine Javi Chávez) 10/21/2016 Influenza Vaccine PF 11/13/2013 Influenza Vaccine Whole 10/1/2012 Tdap 6/30/2015  3:59 PM  
  
 Not reviewed this visit You Were Diagnosed With   
  
 Codes Comments Acute non-recurrent maxillary sinusitis    -  Primary ICD-10-CM: J01.00 ICD-9-CM: 461.0 Vitals BP Pulse Temp Resp Height(growth percentile) Weight(growth percentile) 139/79 (BP 1 Location: Right arm, BP Patient Position: Sitting) 62 98.3 °F (36.8 °C) (Oral) 18 5' 5\" (1.651 m) 214 lb (97.1 kg) LMP SpO2 BMI OB Status Smoking Status 07/15/2011 98% 35.61 kg/m2 Postmenopausal Never Smoker Vitals History BMI and BSA Data Body Mass Index Body Surface Area  
 35.61 kg/m 2 2.11 m 2 Preferred Pharmacy Pharmacy Name Phone [facility] [address] [place] [address] [phone] Your Updated Medication List  
  
   
This list is accurate as of: 8/30/17  3:42 PM.  Always use your most recent med list.  
  
  
  
  
 albuterol 90 mcg/actuation inhaler Commonly known as:  PROAIR HFA Take 1 Puff by inhalation every four (4) hours as needed for Wheezing or Shortness of Breath. AMITRIPTYLINE PO Take  by mouth. Indications: unknown dosage, prescribed by her Psychiatrist  
  
 amoxicillin-clavulanate 875-125 mg per tablet Commonly known as:  AUGMENTIN Take 1 Tab by mouth two (2) times a day for 10 days. dextroamphetamine-amphetamine 10 mg tablet Commonly known as:  ADDERALL Take 10 mg by mouth two (2) times a day. DULoxetine 60 mg capsule Commonly known as:  CYMBALTA TAKE ONE CAPSULE BY MOUTH TWICE DAILY FLEXERIL PO Take 10 mg by mouth Before breakfast, lunch, and dinner. gabapentin 400 mg capsule Commonly known as:  NEURONTIN Take 2 Caps by mouth three (3) times daily as needed. hydrOXYzine HCl 25 mg tablet Commonly known as:  ATARAX Take 1 Tab by mouth three (3) times daily as needed for Itching. NUCYNTA  mg Tb12 Generic drug:  tapentadol Take  by mouth. omeprazole 40 mg capsule Commonly known as:  PRILOSEC Take 1 Cap by mouth daily for 90 days. oxyCODONE IR 20 mg immediate release tablet Commonly known as:  Claressa Dills Take  by mouth. oxyMORphone 10 mg Tab tablet Commonly known as:  OPANA Take 10 mg by mouth every six (6) hours as needed for Pain. promethazine 12.5 mg tablet Commonly known as:  PHENERGAN Take 1 Tab by mouth every eight (8) hours as needed for Nausea. PROzac 10 mg capsule Generic drug:  FLUoxetine Take  by mouth daily. terbinafine HCl 250 mg tablet Commonly known as:  LAMISIL Take 1 Tab by mouth daily. X 12 weeks. traZODone 100 mg tablet Commonly known as:  Aguirre Elks Take 100 mg by mouth nightly. XANAX 1 mg tablet Generic drug:  ALPRAZolam  
Take  by mouth. ZOFRAN ODT 8 mg disintegrating tablet Generic drug:  ondansetron Take 8 mg by mouth every eight (8) hours as needed for Nausea. Prescriptions Sent to Pharmacy Refills  
 amoxicillin-clavulanate (AUGMENTIN) 875-125 mg per tablet 0 Sig: Take 1 Tab by mouth two (2) times a day for 10 days. Class: Normal  
 Pharmacy: [address] [place] [address] #: [phone] Route: Oral  
  
Follow-up Instructions Return if symptoms worsen or fail to improve. Patient Instructions Sinusitis: Care Instructions Your Care Instructions Sinusitis is an infection of the lining of the sinus cavities in your head. Sinusitis often follows a cold. It causes pain and pressure in your head and face. In most cases, sinusitis gets better on its own in 1 to 2 weeks. But some mild symptoms may last for several weeks. Sometimes antibiotics are needed. Follow-up care is a key part of your treatment and safety. Be sure to make and go to all appointments, and call your doctor if you are having problems. It's also a good idea to know your test results and keep a list of the medicines you take. How can you care for yourself at home? · Take an over-the-counter pain medicine, such as acetaminophen (Tylenol), ibuprofen (Advil, Motrin), or naproxen (Aleve). Read and follow all instructions on the label. · If the doctor prescribed antibiotics, take them as directed. Do not stop taking them just because you feel better. You need to take the full course of antibiotics. · Be careful when taking over-the-counter cold or flu medicines and Tylenol at the same time. Many of these medicines have acetaminophen, which is Tylenol. Read the labels to make sure that you are not taking more than the recommended dose. Too much acetaminophen (Tylenol) can be harmful. · Breathe warm, moist air from a steamy shower, a hot bath, or a sink filled with hot water. Avoid cold, dry air. Using a humidifier in your home may help. Follow the directions for cleaning the machine. · Use saline (saltwater) nasal washes to help keep your nasal passages open and wash out mucus and bacteria. You can buy saline nose drops at a grocery store or drugstore. Or you can make your own at home by adding 1 teaspoon of salt and 1 teaspoon of baking soda to 2 cups of distilled water. If you make your own, fill a bulb syringe with the solution, insert the tip into your nostril, and squeeze gently. Charline Gonzalez your nose. · Put a hot, wet towel or a warm gel pack on your face 3 or 4 times a day for 5 to 10 minutes each time. · Try a decongestant nasal spray like oxymetazoline (Afrin). Do not use it for more than 3 days in a row. Using it for more than 3 days can make your congestion worse. When should you call for help? Call your doctor now or seek immediate medical care if: 
· You have new or worse swelling or redness in your face or around your eyes. · You have a new or higher fever. Watch closely for changes in your health, and be sure to contact your doctor if: 
· You have new or worse facial pain. · The mucus from your nose becomes thicker (like pus) or has new blood in it. · You are not getting better as expected. Where can you learn more? Go to http://jonathon-margareth.info/. Enter Z615 in the search box to learn more about \"Sinusitis: Care Instructions. \" Current as of: July 29, 2016 Content Version: 11.3 © 7974-0262 Farm[employer]. Care instructions adapted under license by [employer] (which disclaims liability or warranty for this information). If you have questions about a medical condition or this instruction, always ask your healthcare professional. [address] any warranty or liability for your use of this information. Introducing [facility] & HEALTH SERVICES! Dear Lashell Bolanos: Thank you for requesting a SupportSpace account. Our records indicate that you already have an active SupportSpace account. You can access your account anytime at [email or web address] Did you know that you can access your hospital and ER discharge instructions at any time in SupportSpace? You can also review all of your test results from your hospital stay or ER visit. Additional Information If you have questions, please visit the Frequently Asked Questions section of the SupportSpace website at [email or web address] Remember, SupportSpace is NOT to be used for urgent needs. For medical emergencies, dial 911. Now available from your iPhone and Android! Please provide this summary of care documentation to your next provider. Your primary care clinician is listed as [address]. If you have any questions after today's visit, please call [phone].

## 2017-08-30 NOTE — PROGRESS NOTES
Identified pt with two pt identifiers(name and ). Chief Complaint   Patient presents with    Sore Throat     for a little over a month has been making her self throw up. About 2 times a week. Says she feels like she is eating too much.  Headache    Nasal Congestion    Ear Pain        Health Maintenance Due   Topic    BREAST CANCER SCRN MAMMOGRAM     INFLUENZA AGE 9 TO ADULT     COLONOSCOPY        Wt Readings from Last 3 Encounters:   17 214 lb (97.1 kg)   17 215 lb (97.5 kg)   17 215 lb (97.5 kg)     Temp Readings from Last 3 Encounters:   17 98.3 °F (36.8 °C) (Oral)   17 98 °F (36.7 °C) (Oral)   17 98.3 °F (36.8 °C) (Oral)     BP Readings from Last 3 Encounters:   17 139/79   17 123/74   17 132/80     Pulse Readings from Last 3 Encounters:   17 62   17 81   17 68         Learning Assessment:  :     Learning Assessment 2016 2015 2015 1/3/2014   PRIMARY LEARNER Patient Patient Patient Patient   HIGHEST LEVEL OF EDUCATION - PRIMARY LEARNER  - - - [facility] CAREGIVER - No - -   PRIMARY LANGUAGE ENGLISH ENGLISH ENGLISH ENGLISH   LEARNER PREFERENCE PRIMARY DEMONSTRATION DEMONSTRATION VIDEOS READING     - - - VIDEOS   ANSWERED BY patient  patient patient patient   RELATIONSHIP SELF SELF SELF SELF       Depression Screening:  :     No flowsheet data found. Fall Risk Assessment:  :     No flowsheet data found. Abuse Screening:  :     Abuse Screening Questionnaire 2015 3/10/2014   Do you ever feel afraid of your partner? N N   Are you in a relationship with someone who physically or mentally threatens you? N N   Is it safe for you to go home?  Y Y       Coordination of Care Questionnaire:  :     1) Have you been to an emergency room, urgent care clinic since your last visit? no   Hospitalized since your last visit? no             2) Have you seen or consulted any other health care providers outside of [address] [place] [address] since your last visit? yes Sees Dr. Aimee Obando for pain management  (Include any pap smears or colon screenings in this section.)    3) Do you have an Advance Directive on file? no  Are you interested in receiving information about Advance Directives? no    Reviewed record in preparation for visit and have obtained necessary documentation. Medication reconciliation up to date and corrected with patient at this time.

## 2017-09-01 ENCOUNTER — TELEPHONE (OUTPATIENT)
Dept: FAMILY MEDICINE CLINIC | Age: 57
End: 2017-09-01

## 2017-09-01 DIAGNOSIS — J01.00 ACUTE NON-RECURRENT MAXILLARY SINUSITIS: Primary | ICD-10-CM

## 2017-09-01 RX ORDER — AZITHROMYCIN 250 MG/1
TABLET, FILM COATED ORAL
Qty: 6 TAB | Refills: 0 | Status: SHIPPED | OUTPATIENT
Start: 2017-09-01 | End: 2017-09-06

## 2017-09-01 NOTE — TELEPHONE ENCOUNTER
I have sent a z-pack to the pharmacy, but please advise the patient that she can NOT take her Atarax with this medication. Thanks!

## 2017-09-01 NOTE — TELEPHONE ENCOUNTER
The pt is calling stating she is having a hard time with the antibiotic that was given and she would like to know if she can get a z-pack called in instead.  Please advise

## 2017-09-07 ENCOUNTER — TELEPHONE (OUTPATIENT)
Dept: FAMILY MEDICINE CLINIC | Age: 57
End: 2017-09-07

## 2017-09-07 NOTE — TELEPHONE ENCOUNTER
Patient called and stated that she has finished z pack and is feeling worse now has a non productive cough along with everything else.   Please call at [phone]

## 2017-09-07 NOTE — TELEPHONE ENCOUNTER
LVM on personal  Number that an appt is needed before meds can be given per ELLIOT Ryan NP instructions.

## 2017-10-18 DIAGNOSIS — G89.29 CHRONIC MIDLINE LOW BACK PAIN WITHOUT SCIATICA: ICD-10-CM

## 2017-10-18 DIAGNOSIS — M54.50 CHRONIC MIDLINE LOW BACK PAIN WITHOUT SCIATICA: ICD-10-CM

## 2017-10-18 RX ORDER — DULOXETIN HYDROCHLORIDE 60 MG/1
CAPSULE, DELAYED RELEASE ORAL
Qty: 60 CAP | Refills: 2 | Status: SHIPPED | OUTPATIENT
Start: 2017-10-18 | End: 2019-04-30 | Stop reason: SDUPTHER

## 2017-10-19 RX ORDER — NAPROXEN 500 MG/1
TABLET ORAL
Qty: 60 TAB | Refills: 0 | Status: SHIPPED | OUTPATIENT
Start: 2017-10-19 | End: 2018-05-09 | Stop reason: SDUPTHER

## 2017-10-20 ENCOUNTER — OFFICE VISIT (OUTPATIENT)
Dept: NEUROLOGY | Age: 57
End: 2017-10-20

## 2017-10-20 DIAGNOSIS — F33.2 SEVERE RECURRENT MAJOR DEPRESSION WITHOUT PSYCHOTIC FEATURES (HCC): ICD-10-CM

## 2017-10-20 DIAGNOSIS — F43.10 PTSD (POST-TRAUMATIC STRESS DISORDER): Primary | ICD-10-CM

## 2017-10-20 DIAGNOSIS — F41.9 SEVERE ANXIETY: ICD-10-CM

## 2017-10-20 NOTE — PROGRESS NOTES
Office feedback session with the patient today. I reviewed the results of the recent Neuropsychological Evaluation, including discussing individual tests as well as patient's areas of neurocognitive strength versus weakness. Education was provided regarding my diagnostic impressions, and we discussed treatment plan/options. I also answered numerous questions related to the clinical findings, including discussing various methods to improve cognition and mood. Counseling provided regarding mood and cognition. Dsicussed how memory is normal and that this is more a psychiatric than organic issue. This is reassuring given her history of stroke. CBT and supportive psychotherapy techniques were utilized. The patient will follow up with the referring provider, and reported being very pleased with the services provided. Follow up with [facility] prn. 20 minutes with patient, record review, coordination of care. Records provided. We discussed: This is a predominant normal range Neuropsychological Evaluation with respect to neurocognitive functioning. In this regard, the only impairment noted was for sustained visual attention. Otherwise, her mental status, verbal fluency, confrontation naming, working memory, processing speed, perceptual reasoning, verbal comprehension, executive functioning, and bilateral fine motor dexterity are normal.  From an emotional standpoint, there is severe PTSD with severe depression and anxiety. Her anxiety is significant to the degree whereby her ability to attend and to concentrate are compromised.                             In my opinion, this profile is inconsistent with dementia. It is also inconsistent with residua from CVA. Aside from a chronic ADHD- Inattentive type concern, this is a normal range Neurocognitive profile. Instead, the day-to-day problems and decline are secondary to profound emotional distress secondary to PTSD.   I suggest intensive psychiatric treatment to include long term psychiatric medication management for anxiety and depression along with active participation in intensive psychotherapy. Her risk for self-harm remains elevated and needs to be monitored by her mental health care providers. I hope she is reassured that this is not a dementing condition. Once mood improves, if attention continues to impact her functioning, then I would recommend consideration for an appropriate medication for attention if this is not medically contraindicated. I am not concerned about competency, day-to-day supervision, etc.  Baseline now established. Follow up prn. Clinical correlation is, of course, indicated.                              I will discuss these findings with the patient when she follows up with me in the near future.   A follow up Neuropsychological Evaluation is indicated on a prn basis, especially if there are any cognitive and/or emotional changes.       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

## 2017-10-31 ENCOUNTER — HOSPITAL ENCOUNTER (OUTPATIENT)
Dept: PHYSICAL THERAPY | Age: 57
Discharge: HOME | End: 2017-10-31
Payer: MEDICARE

## 2017-10-31 VITALS
DIASTOLIC BLOOD PRESSURE: 72 MMHG | WEIGHT: 218 LBS | RESPIRATION RATE: 18 BRPM | HEIGHT: 65 IN | BODY MASS INDEX: 36.32 KG/M2 | HEART RATE: 77 BPM | SYSTOLIC BLOOD PRESSURE: 124 MMHG

## 2017-10-31 PROCEDURE — 97140 MANUAL THERAPY 1/> REGIONS: CPT

## 2017-10-31 PROCEDURE — G8990 OTHER PT/OT CURRENT STATUS: HCPCS | Performed by: PHYSICAL THERAPIST

## 2017-10-31 PROCEDURE — G8991 OTHER PT/OT GOAL STATUS: HCPCS | Performed by: PHYSICAL THERAPIST

## 2017-10-31 PROCEDURE — 97164 PT RE-EVAL EST PLAN CARE: CPT

## 2017-10-31 NOTE — PROGRESS NOTES
[address]  [facility]  [address] [facility]  [address]  [address]      INITIAL EVALUATION    NAME: Derek Eng AGE: 64 y.o. GENDER: female  DATE: 6/9/2017  REFERRING PHYSICIAN: Abel Mcginnis MD  HISTORY AND BACKGROUND: Patient is a 64year old female with PMHx as documented below who is referred to this clinic by PCP for the evaluation and treatment of LE lymphedema. She has been treated at this clinic in the past for lipoedema and has received compression garments through the [facility]. She denies any history of cellulitis and denies pain except from left knee and left shoulder. Her left knee reportedly gives out on her. She has been evaluated by Dr Colman Cockayne (ortho) and was reportedly recommended to have a TKA. Primary Diagnosis:  · Bilateral LE lipoedema with secondary lymphedema component (I89.0)        Date of Onset: 2011 original onset, referred to lymphedema clinic 4/2014 order for assessment of lymphedema. Returned to clinic 2/2016 and was fit with compression garments that she obtained from the [facility]  Present Symptoms and Functional Limitations: LE swelling that causes limbs to feel heavy    Lower Extremity Functional Scale: 49/72 or 72% functional impairment  Past Medical History:   Past Medical History:   Diagnosis Date    Acid reflux 1/17/2011    Anemia     Burn     Shoulder, back and buttocks     CAD (coronary artery disease)     Chronic pain     Followed by Dr. Jarrod Lebron Dysthymic disorder 11/9/2012    Family history of skin cancer     Fibromyalgia     Herniated cervical disc     Hx of mammogram 2015    Negative per pt.  Harshil'd w/[facility] on 6/28/17    Kidney stone     Lymphedema     Memory loss, short term     MVP (mitral valve prolapse)     PCOS (polycystic ovarian syndrome)     PTSD (post-traumatic stress disorder)     Routine Papanicolaou smear 2015    Negative ; HPV Negative     Scoliosis     Stroke [facility])     2014    Sun-damaged skin     Sunburn, blistering     Tanning bed exposure     TIA on medication     TIA L hemiparesis - Was on diet pills    Vitamin B 12 deficiency      Past Surgical History:   Procedure Laterality Date    CARDIAC SURG PROCEDURE UNLIST      heart attack, mitral valve prolapse    HX  SECTION      , PCOS    HX GASTRIC BYPASS  1999    [facility]: open gastric bypass, naseem,  umb hernia repair, tr. vagotomy    HX ORTHOPAEDIC  [id number],    bunion surgery, heel spur surgery    HX TONSILLECTOMY      As a child      Current Medications:    Current Outpatient Prescriptions   Medication Sig    naproxen (NAPROSYN) 500 mg tablet TAKE ONE TABLET BY MOUTH TWO TIMES A DAY WITH FOOD    DULoxetine (CYMBALTA) 60 mg capsule TAKE ONE CAPSULE BY MOUTH TWICE DAILY    FLUoxetine (PROZAC) 10 mg capsule Take  by mouth daily.  ALPRAZolam (XANAX) 1 mg tablet Take  by mouth.  oxyCODONE IR (ROXICODONE) 20 mg immediate release tablet Take  by mouth.  gabapentin (NEURONTIN) 400 mg capsule Take 2 Caps by mouth three (3) times daily as needed.  oxyMORphone (OPANA) 10 mg tab tablet Take 10 mg by mouth every six (6) hours as needed for Pain.  promethazine (PHENERGAN) 12.5 mg tablet Take 1 Tab by mouth every eight (8) hours as needed for Nausea.  tapentadol (NUCYNTA ER) 150 mg Tb12 Take  by mouth.  traZODone (DESYREL) 100 mg tablet Take 100 mg by mouth nightly.  AMITRIPTYLINE HCL (AMITRIPTYLINE PO) Take  by mouth. Indications: unknown dosage, prescribed by her Psychiatrist    terbinafine HCl (LAMISIL) 250 mg tablet Take 1 Tab by mouth daily. X 12 weeks.  hydrOXYzine HCl (ATARAX) 25 mg tablet Take 1 Tab by mouth three (3) times daily as needed for Itching.     ondansetron (ZOFRAN ODT) 8 mg disintegrating tablet Take 8 mg by mouth every eight (8) hours as needed for Nausea.  dextroamphetamine-amphetamine (ADDERALL) 10 mg tablet Take 10 mg by mouth two (2) times a day.  CYCLOBENZAPRINE HCL (FLEXERIL PO) Take 10 mg by mouth Before breakfast, lunch, and dinner.  omeprazole (PRILOSEC) 40 mg capsule Take 1 Cap by mouth daily for 90 days.  albuterol (PROAIR HFA) 90 mcg/actuation inhaler Take 1 Puff by inhalation every four (4) hours as needed for Wheezing or Shortness of Breath. No current facility-administered medications for this encounter. Allergies: Allergies   Allergen Reactions    Bee Sting [Sting, Bee] Anaphylaxis    Morphine Other (comments)     Severe stomach cramps     Vicodin [Hydrocodone-Acetaminophen] Hives      Prior Level of Function/Social/Work History/Personal factors and/or comorbidities impacting plan of care: Patient does not work; she is on disability. She is currently  from her . Living Situation: private residence with spouse who is also on disability    Trainable Caregiver?: self  Self-care/ADLs: Indep    Mobility: Indep     Previous Therapy:  2014 received a pair of compression pantyhose through the [facility] and then in 2016, received a pair of Juzo thigh length 3512 OTS compression stockings and 2 pairs of the [employer] Compre[employer] through the [address] [facility]:   Patient arrives to PT reporting she went to [facility] in [place], [place] to get measured for a Danielito Sleeve compression garment for her legs. She said she had researched lymphedema management online and drove there, adding \"it said they provide indigent care. \"  She said that the company will pay a large portion of the garments, but she will have to pay $2,000. She does not have a plan in place to pay her portion and the order has not been processed. The pump she has at home is reportedly broken. She inquires about getting a new pump. She c/o swelling in her arms and legs.   She said she is walking daily and is trying to eat healthfully. Patients goals for therapy: \"I would like it if you would sign the paperwork that says I need these compression stockings and the [employer] so that I can get them for free. \"    OBJECTIVE DATA SUMMARY:   EXAMINATION/PRESENTATION/DECISION MAKING:   Pain:0/10    Observation: Patient ambulated into clinic independently, antalgic gait L LE, NAD. Self-Care and ADLs: Indep  Skin and Tissue Assessment:  Dermal Status:Intact. Texture/Consistency:Tissue on thighs is very loose with cottage cheese appearance of fat (see photos above)  Pigmentation/Color Change: LE normal  Anomalies:none  Circulatory:DP pulses palpable b/l 3+  Nails:  ( x)  Normal  ( )  Fungus  Height:  Height: 5' 5\" (165.1 cm)  Weight:  Weight: 98.9 kg (218 lb)   BMI:  BMI (calculated): 36.3  (36 or greater: adversely affecting lymphedema)  Volumetric Measurements:   Right:  14,693 mL Left:  14,709 mL   % Difference: 0.11%%    (See scanned graph)  Range of Motion: LE and UE ROM is WNL  Strength: UE and LE MMT is grossly 5/5  Sensation:  LE myotomes are intact to light touch  Mobility: independent  Gait:Independent  Safety: Patient is A & O x 4      Functional Measure: In compliance with CMSs Claims Based Outcome Reporting, the following G-code set was chosen for this patient based on their primary functional limitation being treated: The outcome measure chosen to determine the severity of the functional limitation was the Lymphedema Life Impact Scale with a score of 49/68 which was correlated with the impairment scale. ?  Other PT/OT Primary Functional Limitations:     - CURRENT STATUS: CL - 60%-79% impaired, limited or restricted    - GOAL STATUS: CK - 40%-59% impaired, limited or restricted    - D/C STATUS:  ---------------To be determined---------------           Physical Therapy Evaluation Charge Determination   History Examination Presentation Decision-Making   MEDIUM  Complexity : 1-2 comorbidities / personal factors will impact the outcome/ POC  MEDIUM Complexity : 3 Standardized tests and measures addressing body structure, function, activity limitation and / or participation in recreation  LOW Complexity : Stable, uncomplicated  LOW Complexity : FOTO score of       Based on the above components, the patient evaluation is determined to be of the following complexity level: LOW     Evaluation Time: 1:00 - 1:25    TREATMENT PROVIDED:   1. Educated the patient on the difference between lipoedema vs lymphedema, referring to the literature ([facility], Best Practice Guidelines: The Management of Lipoedema. 2017) to discuss standard of care. I explained that Medicare will not cover compression garments, and she has previously received compression garments through the [facility], so it is uncertain if she will be granted garments gratis again. Measured her for compression bike shorts (Juzo size V) and OTS thigh length compression stockings, CCL 1 (Juzo size IV, regular length). Patient electing black for her color choice. Educated the patient in importance of resistance training to build muscle mass to help manage her lymphedema. I educated the patient on the [facility] and explained why she is not a candidate for it;she does not have any fibrosis and her primary lipedema will not be affected by this garment. Opportunity for questions and clarification were provided. Treatment time 1:25 - 2:00 Minutes (30 (Manual x 2)    ASSESSMENT:   Jacqueline Adorno is a 64 y.o. female who presents with lipedema with secondary lymphedema affecting primarily her lower extremities. Patient also c/o intermittent swelling in her arms upon walking that resolves about 1 hour after being up.   There is no evidence of pitting edema in her LEs or UEs, which is more consistent with lipedema, a chronic adipose tissue disorder, versus lymphedema, which results from impaired transport capacity in the lymphatic system. While manual lymphatic drainage can be effective at treating lymphedema, it is less indicated for lipedema. She denies general leg pain, reports she is walking daily, and is independent with all ADLs and IADLs. However, her functional impairment based on her Lymphedema Life Impact Scale score is 72%; objective findings are inconsistent with her subjective complaints. Still, she would benefit from wearing full leg CCL 1 compression garments to help manage her condition and needs replacement garments as hers are worn through and no longer therapeutic. While she took it upon herself to explore the [facility] Sleeve night time compression garment,  I do not think it is indicated for her. She does not have any of the trophic changes associated with chronic swelling such as hyperpigmentation or fibrosis, and has no evidence of any pitting edema that would necessitate the use and wear of this specialized compression garment. Financial resources continue to be a problem for her, she has no insurance coverage for stockings, and has previously been granted compression garments gratis through the [facility] in the past, so I am not sure how she will obtain the recommended CCL 1 compression stockings and compression bike shorts. I am recommending teaching her self manual lymphatic drainage, assessing her pump to see if it is operational and putting her in touch with the company who provided it to her if it isn't for repair/refurbish, therapeutic exercise and compression garment provision and fitting. This care is medically necessary due to the risk of infection associated with lymphedema and to maximize patient's function. PLAN OF CARE:   Compression Garment Provision and Fitting  MLD  Pump  Therapeutic Exercise  Patient Education    4-8 prn PT visits over 6-12 weeks    Patient has participated in goal setting and agrees to work toward plan of care.   Patient was instructed to call if questions or concerns arise. Thank you for this referral.  Marilee Delong, PT, CLT   Time Calculation: 60 mins    TREATMENT PLAN EFFECTIVE DATES:   10/31/2017 TO 1/29/2017  I have read the above plan of care for Uzma Anderson. I certify the above prescribed services are required by this patient and are medically necessary.   The above plan of care has been developed in conjunction with the lymphedema/physical therapist.       Physician Signature: ____________________________________Date:______________

## 2017-12-08 ENCOUNTER — OFFICE VISIT (OUTPATIENT)
Dept: FAMILY MEDICINE CLINIC | Age: 57
End: 2017-12-08

## 2017-12-08 ENCOUNTER — TELEPHONE (OUTPATIENT)
Dept: FAMILY MEDICINE CLINIC | Age: 57
End: 2017-12-08

## 2017-12-08 VITALS
HEART RATE: 72 BPM | TEMPERATURE: 97.9 F | DIASTOLIC BLOOD PRESSURE: 67 MMHG | RESPIRATION RATE: 18 BRPM | WEIGHT: 215 LBS | SYSTOLIC BLOOD PRESSURE: 124 MMHG | OXYGEN SATURATION: 99 % | BODY MASS INDEX: 35.82 KG/M2 | HEIGHT: 65 IN

## 2017-12-08 DIAGNOSIS — J01.00 ACUTE NON-RECURRENT MAXILLARY SINUSITIS: ICD-10-CM

## 2017-12-08 DIAGNOSIS — R35.0 FREQUENCY OF URINATION: ICD-10-CM

## 2017-12-08 DIAGNOSIS — J01.00 ACUTE NON-RECURRENT MAXILLARY SINUSITIS: Primary | ICD-10-CM

## 2017-12-08 LAB
BILIRUB UR QL STRIP: NEGATIVE
GLUCOSE UR-MCNC: NEGATIVE MG/DL
KETONES P FAST UR STRIP-MCNC: NEGATIVE MG/DL
PH UR STRIP: 7 [PH] (ref 4.6–8)
PROT UR QL STRIP: NEGATIVE
SP GR UR STRIP: 1.02 (ref 1–1.03)
UA UROBILINOGEN AMB POC: NORMAL (ref 0.2–1)
URINALYSIS CLARITY POC: CLEAR
URINALYSIS COLOR POC: YELLOW
URINE BLOOD POC: NEGATIVE
URINE LEUKOCYTES POC: NEGATIVE
URINE NITRITES POC: NEGATIVE

## 2017-12-08 RX ORDER — AMOXICILLIN AND CLAVULANATE POTASSIUM 875; 125 MG/1; MG/1
1 TABLET, FILM COATED ORAL 2 TIMES DAILY
Qty: 20 TAB | Refills: 0 | Status: SHIPPED | OUTPATIENT
Start: 2017-12-08 | End: 2017-12-18

## 2017-12-08 RX ORDER — AMOXICILLIN AND CLAVULANATE POTASSIUM 875; 125 MG/1; MG/1
1 TABLET, FILM COATED ORAL 2 TIMES DAILY
Qty: 20 TAB | Refills: 0 | Status: SHIPPED | OUTPATIENT
Start: 2017-12-08 | End: 2017-12-08 | Stop reason: SDUPTHER

## 2017-12-08 RX ORDER — TOPIRAMATE 50 MG/1
TABLET, FILM COATED ORAL
COMMUNITY
Start: 2017-11-15 | End: 2018-05-09

## 2017-12-08 RX ORDER — OMEPRAZOLE 40 MG/1
CAPSULE, DELAYED RELEASE ORAL
COMMUNITY
Start: 2017-10-16 | End: 2018-05-09

## 2017-12-08 NOTE — PATIENT INSTRUCTIONS
Sinusitis: Care Instructions  Your Care Instructions    Sinusitis is an infection of the lining of the sinus cavities in your head. Sinusitis often follows a cold. It causes pain and pressure in your head and face. In most cases, sinusitis gets better on its own in 1 to 2 weeks. But some mild symptoms may last for several weeks. Sometimes antibiotics are needed. Follow-up care is a key part of your treatment and safety. Be sure to make and go to all appointments, and call your doctor if you are having problems. It's also a good idea to know your test results and keep a list of the medicines you take. How can you care for yourself at home? · Take an over-the-counter pain medicine, such as acetaminophen (Tylenol), ibuprofen (Advil, Motrin), or naproxen (Aleve). Read and follow all instructions on the label. · If the doctor prescribed antibiotics, take them as directed. Do not stop taking them just because you feel better. You need to take the full course of antibiotics. · Be careful when taking over-the-counter cold or flu medicines and Tylenol at the same time. Many of these medicines have acetaminophen, which is Tylenol. Read the labels to make sure that you are not taking more than the recommended dose. Too much acetaminophen (Tylenol) can be harmful. · Breathe warm, moist air from a steamy shower, a hot bath, or a sink filled with hot water. Avoid cold, dry air. Using a humidifier in your home may help. Follow the directions for cleaning the machine. · Use saline (saltwater) nasal washes to help keep your nasal passages open and wash out mucus and bacteria. You can buy saline nose drops at a grocery store or drugstore. Or you can make your own at home by adding 1 teaspoon of salt and 1 teaspoon of baking soda to 2 cups of distilled water. If you make your own, fill a bulb syringe with the solution, insert the tip into your nostril, and squeeze gently. Kaden Corderoderick your nose.   · Put a hot, wet towel or a warm gel pack on your face 3 or 4 times a day for 5 to 10 minutes each time. · Try a decongestant nasal spray like oxymetazoline (Afrin). Do not use it for more than 3 days in a row. Using it for more than 3 days can make your congestion worse. When should you call for help? Call your doctor now or seek immediate medical care if:  ? · You have new or worse swelling or redness in your face or around your eyes. ? · You have a new or higher fever. ? Watch closely for changes in your health, and be sure to contact your doctor if:  ? · You have new or worse facial pain. ? · The mucus from your nose becomes thicker (like pus) or has new blood in it. ? · You are not getting better as expected. Where can you learn more? Go to http://jonathon-margareth.info/. Enter V026 in the search box to learn more about \"Sinusitis: Care Instructions. \"  Current as of: May 12, 2017  Content Version: 11.4  © 5698-6708 [employer]. Care instructions adapted under license by [employer] (which disclaims liability or warranty for this information). If you have questions about a medical condition or this instruction, always ask your healthcare professional. [address] any warranty or liability for your use of this information.

## 2017-12-08 NOTE — TELEPHONE ENCOUNTER
Spoke with PSR Carina concerning patient. Patient seen by Wolf Biggs NP today for which she was prescribed Augmentin 875-125 mg. Prescription was sent to [facility]. Patient called back after appointment stating that medication needed to be sent to [facility] per her insurance. Provider has left the office for today and I have sent prescription to [facility]. Patient advised of this and prescription sent to [facility]) cancelled. Orders Placed This Encounter    amoxicillin-clavulanate (AUGMENTIN) 875-125 mg per tablet     Sig: Take 1 Tab by mouth two (2) times a day for 10 days.      Dispense:  20 Tab     Refill:  0

## 2017-12-08 NOTE — PROGRESS NOTES
Identified pt with two pt identifiers(name and ). Chief Complaint   Patient presents with    Urgency    Nasal Congestion        Health Maintenance Due   Topic    BREAST CANCER SCRN MAMMOGRAM     COLONOSCOPY        Wt Readings from Last 3 Encounters:   17 215 lb (97.5 kg)   10/31/17 218 lb (98.9 kg)   17 214 lb (97.1 kg)     Temp Readings from Last 3 Encounters:   17 97.9 °F (36.6 °C) (Oral)   17 98.3 °F (36.8 °C) (Oral)   17 98 °F (36.7 °C) (Oral)     BP Readings from Last 3 Encounters:   10/31/17 124/72   17 139/79   17 123/74     Pulse Readings from Last 3 Encounters:   17 72   10/31/17 77   17 62         Learning Assessment:  :     Learning Assessment 2016 2015 2015 1/3/2014   PRIMARY LEARNER Patient Patient Patient Patient   HIGHEST LEVEL OF EDUCATION - PRIMARY LEARNER  - - - 4 YEARS OF COLLEGE   BARRIERS PRIMARY LEARNER - NONE - COGNITIVE   CO-LEARNER CAREGIVER - No - -   PRIMARY LANGUAGE ENGLISH ENGLISH ENGLISH ENGLISH   LEARNER PREFERENCE PRIMARY DEMONSTRATION DEMONSTRATION VIDEOS READING     - - - VIDEOS   ANSWERED BY patient  patient patient patient   RELATIONSHIP SELF SELF SELF SELF       Depression Screening:  :     No flowsheet data found. Fall Risk Assessment:  :     No flowsheet data found. Abuse Screening:  :     Abuse Screening Questionnaire 2015 3/10/2014   Do you ever feel afraid of your partner? N N   Are you in a relationship with someone who physically or mentally threatens you? N N   Is it safe for you to go home?  Y Y       Coordination of Care Questionnaire:  :     1) Have you been to an emergency room, urgent care clinic since your last visit? no   Hospitalized since your last visit? no             2) Have you seen or consulted any other health care providers outside of [address] [place] [address] since your last visit? no  (Include any pap smears or colon screenings in this section.)    3) Do you have an Advance Directive on file? no  Are you interested in receiving information about Advance Directives? no    Reviewed record in preparation for visit and have obtained necessary documentation. Medication reconciliation up to date and corrected with patient at this time.

## 2017-12-08 NOTE — MR AVS SNAPSHOT
Visit Information Date & Time Provider Department Dept. Phone Encounter #  
 12/8/2017  2:45 PM WhartonEthan Bull [phone] [id number] Follow-up Instructions Return if symptoms worsen or fail to improve. Upcoming Health Maintenance Date Due  
 BREAST CANCER SCRN MAMMOGRAM 12/1/2016 COLONOSCOPY 11/28/2017 PAP AKA CERVICAL CYTOLOGY 11/19/2018 DTaP/Tdap/Td series (2 - Td) 6/30/2025 Allergies as of 12/8/2017  Review Complete On: 12/8/2017 By: Milo [place] NP Severity Noted Reaction Type Reactions Bee Sting [Sting, Bee]  03/12/2012    Anaphylaxis Morphine  02/04/2016    Other (comments) Severe stomach cramps Vicodin [Hydrocodone-acetaminophen]    Hives Current Immunizations  Reviewed on 6/30/2015 Name Date Influenza Vaccine Wildomar Francheska) 10/21/2016 Influenza Vaccine PF 11/13/2013 Influenza Vaccine Whole 10/1/2012 Tdap 6/30/2015  3:59 PM  
  
 Not reviewed this visit You Were Diagnosed With   
  
 Codes Comments Acute non-recurrent maxillary sinusitis    -  Primary ICD-10-CM: J01.00 ICD-9-CM: 461.0 Frequency of urination     ICD-10-CM: R35.0 ICD-9-CM: 788.41 Vitals BP Pulse Temp Resp Height(growth percentile) Weight(growth percentile) 124/67 (BP 1 Location: Right arm, BP Patient Position: Sitting) 72 97.9 °F (36.6 °C) (Oral) 18 5' 5\" (1.651 m) 215 lb (97.5 kg) LMP SpO2 BMI OB Status Smoking Status 07/15/2011 99% 35.78 kg/m2 Postmenopausal Never Smoker Vitals History BMI and BSA Data Body Mass Index Body Surface Area 35.78 kg/m 2 2.11 m 2 Preferred Pharmacy Pharmacy Name Phone [id number] Francisca Morales, [address] [place] [address] [phone] Your Updated Medication List  
  
   
This list is accurate as of: 12/8/17  3:35 PM.  Always use your most recent med list.  
  
  
  
  
 albuterol 90 mcg/actuation inhaler Commonly known as:  PROAIR HFA Take 1 Puff by inhalation every four (4) hours as needed for Wheezing or Shortness of Breath. AMITRIPTYLINE PO Take  by mouth. Indications: unknown dosage, prescribed by her Psychiatrist  
  
 amoxicillin-clavulanate 875-125 mg per tablet Commonly known as:  AUGMENTIN Take 1 Tab by mouth two (2) times a day for 10 days. dextroamphetamine-amphetamine 10 mg tablet Commonly known as:  ADDERALL Take 10 mg by mouth two (2) times a day. DULoxetine 60 mg capsule Commonly known as:  CYMBALTA TAKE ONE CAPSULE BY MOUTH TWICE DAILY FLEXERIL PO Take 10 mg by mouth Before breakfast, lunch, and dinner. gabapentin 400 mg capsule Commonly known as:  NEURONTIN Take 2 Caps by mouth three (3) times daily as needed. hydrOXYzine HCl 25 mg tablet Commonly known as:  ATARAX Take 1 Tab by mouth three (3) times daily as needed for Itching. naproxen 500 mg tablet Commonly known as:  NAPROSYN  
TAKE ONE TABLET BY MOUTH TWO TIMES A DAY WITH FOOD  
  
 NUCYNTA  mg Tb12 Generic drug:  tapentadol Take  by mouth. * omeprazole 40 mg capsule Commonly known as:  PRILOSEC Take 1 Cap by mouth daily for 90 days. * omeprazole 40 mg capsule Commonly known as:  PRILOSEC  
  
 oxyCODONE IR 20 mg immediate release tablet Commonly known as:  Jt Camel Take  by mouth. oxyMORphone 10 mg Tab tablet Commonly known as:  OPANA Take 10 mg by mouth every six (6) hours as needed for Pain. promethazine 12.5 mg tablet Commonly known as:  PHENERGAN Take 1 Tab by mouth every eight (8) hours as needed for Nausea. PROzac 10 mg capsule Generic drug:  FLUoxetine Take  by mouth daily. terbinafine HCl 250 mg tablet Commonly known as:  LAMISIL Take 1 Tab by mouth daily. X 12 weeks. topiramate 50 mg tablet Commonly known as:  TOPAMAX  
  
 traZODone 100 mg tablet Commonly known as:  Bashir Flores  
 Take 100 mg by mouth nightly. XANAX 1 mg tablet Generic drug:  ALPRAZolam  
Take  by mouth. ZOFRAN ODT 8 mg disintegrating tablet Generic drug:  ondansetron Take 8 mg by mouth every eight (8) hours as needed for Nausea. * Notice: This list has 2 medication(s) that are the same as other medications prescribed for you. Read the directions carefully, and ask your doctor or other care provider to review them with you. Prescriptions Sent to Pharmacy Refills  
 amoxicillin-clavulanate (AUGMENTIN) 875-125 mg per tablet 0 Sig: Take 1 Tab by mouth two (2) times a day for 10 days. Class: Normal  
 Pharmacy: [facility]  #: [phone] Route: Oral  
  
We Performed the Following AMB POC URINALYSIS DIP STICK AUTO W/O MICRO [70282 CPT(R)] CULTURE, URINE [id number] CPT(R)] Follow-up Instructions Return if symptoms worsen or fail to improve. Patient Instructions Sinusitis: Care Instructions Your Care Instructions Sinusitis is an infection of the lining of the sinus cavities in your head. Sinusitis often follows a cold. It causes pain and pressure in your head and face. In most cases, sinusitis gets better on its own in 1 to 2 weeks. But some mild symptoms may last for several weeks. Sometimes antibiotics are needed. Follow-up care is a key part of your treatment and safety. Be sure to make and go to all appointments, and call your doctor if you are having problems. It's also a good idea to know your test results and keep a list of the medicines you take. How can you care for yourself at home? · Take an over-the-counter pain medicine, such as acetaminophen (Tylenol), ibuprofen (Advil, Motrin), or naproxen (Aleve). Read and follow all instructions on the label. · If the doctor prescribed antibiotics, take them as directed.  Do not stop taking them just because you feel better. You need to take the full course of antibiotics. · Be careful when taking over-the-counter cold or flu medicines and Tylenol at the same time. Many of these medicines have acetaminophen, which is Tylenol. Read the labels to make sure that you are not taking more than the recommended dose. Too much acetaminophen (Tylenol) can be harmful. · Breathe warm, moist air from a steamy shower, a hot bath, or a sink filled with hot water. Avoid cold, dry air. Using a humidifier in your home may help. Follow the directions for cleaning the machine. · Use saline (saltwater) nasal washes to help keep your nasal passages open and wash out mucus and bacteria. You can buy saline nose drops at a grocery store or drugstore. Or you can make your own at home by adding 1 teaspoon of salt and 1 teaspoon of baking soda to 2 cups of distilled water. If you make your own, fill a bulb syringe with the solution, insert the tip into your nostril, and squeeze gently. [employer] your nose. · Put a hot, wet towel or a warm gel pack on your face 3 or 4 times a day for 5 to 10 minutes each time. · Try a decongestant nasal spray like oxymetazoline (Afrin). Do not use it for more than 3 days in a row. Using it for more than 3 days can make your congestion worse. When should you call for help? Call your doctor now or seek immediate medical care if: 
? · You have new or worse swelling or redness in your face or around your eyes. ? · You have a new or higher fever. ? Watch closely for changes in your health, and be sure to contact your doctor if: 
? · You have new or worse facial pain. ? · The mucus from your nose becomes thicker (like pus) or has new blood in it. ? · You are not getting better as expected. Where can you learn more? Go to http://jonathon-margareth.info/. Enter C482 in the search box to learn more about \"Sinusitis: Care Instructions. \" Current as of: May 12, 2017 Content Version: 11.4 © 4291-5762 Healthwise, [employer]. Care instructions adapted under license by [employer] (which disclaims liability or warranty for this information). If you have questions about a medical condition or this instruction, always ask your healthcare professional. [address] any warranty or liability for your use of this information. Introducing [facility]! Dear Zaria Hammer: Thank you for requesting a [facility] [facility] account. Our records indicate that you already have an active [employer] [facility] account. You can access your account anytime at [email or web address] Did you know that you can access your hospital and ER discharge instructions at any time in [employer]? You can also review all of your test results from your hospital stay or ER visit. Additional Information If you have questions, please visit the Frequently Asked Questions section of the [employer] [facility] website at [email or web address] Remember, [facility] [facility] is NOT to be used for urgent needs. For medical emergencies, dial 911. Now available from your iPhone and Android! Please provide this summary of care documentation to your next provider. Your primary care clinician is listed as Evelyn Suresh. If you have any questions after today's visit, please call [phone].

## 2017-12-08 NOTE — PROGRESS NOTES
HISTORY OF PRESENT ILLNESS  Ned Lawrence is a 64 y.o. female. HPI  Pt presents with \"urinary urgency and nasal congestion\"    Pt states that her sinus symptoms have been present for about a month, off and on  Headache , due to sinus pressure  Runny nose  Thick nasal congestion  Mucous was clear, and is now yellow/green  No fever  No cough  OTC: Benadryl    In addition, yesterday, she noted some urgency with urination. No burning or pain with urination. No blood noted in urine. No abdominal pain, no nausea, no vomiting,  No vaginal discharge, no vaginal itching. Review of Systems   Constitutional: Negative for fever. HENT: Positive for congestion and sinus pain. Respiratory: Negative for cough. Gastrointestinal: Negative for abdominal pain, diarrhea, nausea and vomiting. Genitourinary: Positive for urgency. Negative for dysuria, flank pain, frequency and hematuria. Physical Exam   Constitutional: She is oriented to person, place, and time. She appears well-developed and well-nourished. HENT:   Head: Normocephalic and atraumatic. Right Ear: Hearing, tympanic membrane, external ear and ear canal normal.   Left Ear: Hearing, tympanic membrane, external ear and ear canal normal.   Nose: Mucosal edema and rhinorrhea present. Right sinus exhibits maxillary sinus tenderness. Left sinus exhibits maxillary sinus tenderness. Mouth/Throat: Oropharynx is clear and moist.   Neck: Normal range of motion. Neck supple. Cardiovascular: Normal rate, regular rhythm and normal heart sounds. Pulmonary/Chest: Effort normal and breath sounds normal.   Abdominal: There is no CVA tenderness. Lymphadenopathy:     She has no cervical adenopathy. Neurological: She is alert and oriented to person, place, and time. Skin: Skin is warm and dry. Psychiatric: She has a normal mood and affect. Her behavior is normal.       ASSESSMENT and PLAN    ICD-10-CM ICD-9-CM    1.  Acute non-recurrent maxillary sinusitis J01.00 461.0 amoxicillin-clavulanate (AUGMENTIN) 875-125 mg per tablet   2. Frequency of urination R35.0 788.41 AMB POC URINALYSIS DIP STICK AUTO W/O MICRO      CULTURE, URINE     Informed patient that I have sent medication to the pharmacy, and she should take as prescribed. Educated about taking with food, to decrease the risk of stomach upset. Educated about staying well hydrated, and voiding as often as she has the urge. Informed patient that we will notify her when her urine culture returns. Pt informed to return to office with worsening of symptoms, or PRN with any questions or concerns. Pt verbalizes understanding of plan of care and denies further questions or concerns at this time.

## 2017-12-10 LAB
BACTERIA UR CULT: NORMAL
BACTERIA UR CULT: NORMAL

## 2017-12-11 ENCOUNTER — TELEPHONE (OUTPATIENT)
Dept: FAMILY MEDICINE CLINIC | Age: 57
End: 2017-12-11

## 2017-12-11 NOTE — PROGRESS NOTES
Please call patient and let her know that her urine culture returned negative. No UTI. She should return to office with continued symptoms. Thanks!

## 2017-12-11 NOTE — TELEPHONE ENCOUNTER
----- Message from Cyndy Mi NP sent at 12/11/2017  7:55 AM EST -----  Please call patient and let her know that her urine culture returned negative. No UTI. She should return to office with continued symptoms. Thanks!

## 2017-12-22 ENCOUNTER — TELEPHONE (OUTPATIENT)
Dept: FAMILY MEDICINE CLINIC | Age: 57
End: 2017-12-22

## 2017-12-22 DIAGNOSIS — R05.9 COUGH: Primary | ICD-10-CM

## 2017-12-22 RX ORDER — CEFDINIR 300 MG/1
300 CAPSULE ORAL 2 TIMES DAILY
Qty: 20 CAP | Refills: 0 | Status: SHIPPED | OUTPATIENT
Start: 2017-12-22 | End: 2018-01-01

## 2017-12-22 NOTE — TELEPHONE ENCOUNTER
Patient called and stated that she was to call and let Alondra Carolina know how she was doing. Stated that she is a little better but still has bad cough and runny nose.   Please call her back at [phone]

## 2017-12-22 NOTE — TELEPHONE ENCOUNTER
I have sent in cefdinir, to her pharmacy. She should take this as prescribed. She should return to office should symptoms continue. Thanks!

## 2018-01-22 ENCOUNTER — OFFICE VISIT (OUTPATIENT)
Dept: FAMILY MEDICINE CLINIC | Age: 58
End: 2018-01-22

## 2018-01-22 VITALS
RESPIRATION RATE: 16 BRPM | TEMPERATURE: 97.9 F | SYSTOLIC BLOOD PRESSURE: 120 MMHG | HEART RATE: 60 BPM | DIASTOLIC BLOOD PRESSURE: 80 MMHG | WEIGHT: 215 LBS | OXYGEN SATURATION: 95 % | HEIGHT: 65 IN | BODY MASS INDEX: 35.82 KG/M2

## 2018-01-22 DIAGNOSIS — J31.0 RHINOSINUSITIS: Primary | ICD-10-CM

## 2018-01-22 DIAGNOSIS — J32.9 RHINOSINUSITIS: Primary | ICD-10-CM

## 2018-01-22 RX ORDER — AZITHROMYCIN 250 MG/1
TABLET, FILM COATED ORAL
Qty: 6 TAB | Refills: 0 | Status: SHIPPED | OUTPATIENT
Start: 2018-01-22 | End: 2018-01-27

## 2018-01-22 RX ORDER — FLUOXETINE HYDROCHLORIDE 40 MG/1
40 CAPSULE ORAL DAILY
COMMUNITY
Start: 2018-01-15 | End: 2019-01-16 | Stop reason: SDUPTHER
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
## 2018-01-22 NOTE — PROGRESS NOTES
Identified pt with two pt identifiers(name and ). Chief Complaint   Patient presents with    Nasal Congestion    Generalized Body Aches    Ear Pain    Nausea        Health Maintenance Due   Topic    BREAST CANCER SCRN MAMMOGRAM     COLONOSCOPY        Wt Readings from Last 3 Encounters:   18 215 lb (97.5 kg)   17 215 lb (97.5 kg)   10/31/17 218 lb (98.9 kg)     Temp Readings from Last 3 Encounters:   18 97.9 °F (36.6 °C) (Oral)   17 97.9 °F (36.6 °C) (Oral)   17 98.3 °F (36.8 °C) (Oral)     BP Readings from Last 3 Encounters:   18 120/80   17 124/67   10/31/17 124/72     Pulse Readings from Last 3 Encounters:   18 60   17 72   10/31/17 77         Learning Assessment:  :     Learning Assessment 2016 2015 2015 1/3/2014   PRIMARY LEARNER Patient Patient Patient Patient   HIGHEST LEVEL OF EDUCATION - PRIMARY LEARNER  - - - 4 YEARS OF COLLEGE   BARRIERS PRIMARY LEARNER - NONE - COGNITIVE   CO-LEARNER CAREGIVER - No - -   PRIMARY LANGUAGE ENGLISH ENGLISH ENGLISH ENGLISH   LEARNER PREFERENCE PRIMARY DEMONSTRATION DEMONSTRATION VIDEOS READING     - - - VIDEOS   ANSWERED BY patient  patient patient patient   RELATIONSHIP SELF SELF SELF SELF       Depression Screening:  :     No flowsheet data found. Fall Risk Assessment:  :     No flowsheet data found. Abuse Screening:  :     Abuse Screening Questionnaire 2015 3/10/2014   Do you ever feel afraid of your partner? N N   Are you in a relationship with someone who physically or mentally threatens you? N N   Is it safe for you to go home?  Y Y       Coordination of Care Questionnaire:  :     1) Have you been to an emergency room, urgent care clinic since your last visit? no   Hospitalized since your last visit? no             2) Have you seen or consulted any other health care providers outside of [address] [place] [address] since your last visit? no  (Include any pap smears or colon screenings in this section.)    3) Do you have an Advance Directive on file? no  Are you interested in receiving information about Advance Directives? no    Reviewed record in preparation for visit and have obtained necessary documentation. Medication reconciliation up to date and corrected with patient at this time.

## 2018-01-22 NOTE — PATIENT INSTRUCTIONS
Sinusitis: Care Instructions  Your Care Instructions    Sinusitis is an infection of the lining of the sinus cavities in your head. Sinusitis often follows a cold. It causes pain and pressure in your head and face. In most cases, sinusitis gets better on its own in 1 to 2 weeks. But some mild symptoms may last for several weeks. Sometimes antibiotics are needed. Follow-up care is a key part of your treatment and safety. Be sure to make and go to all appointments, and call your doctor if you are having problems. It's also a good idea to know your test results and keep a list of the medicines you take. How can you care for yourself at home? · Take an over-the-counter pain medicine, such as acetaminophen (Tylenol), ibuprofen (Advil, Motrin), or naproxen (Aleve). Read and follow all instructions on the label. · If the doctor prescribed antibiotics, take them as directed. Do not stop taking them just because you feel better. You need to take the full course of antibiotics. · Be careful when taking over-the-counter cold or flu medicines and Tylenol at the same time. Many of these medicines have acetaminophen, which is Tylenol. Read the labels to make sure that you are not taking more than the recommended dose. Too much acetaminophen (Tylenol) can be harmful. · Breathe warm, moist air from a steamy shower, a hot bath, or a sink filled with hot water. Avoid cold, dry air. Using a humidifier in your home may help. Follow the directions for cleaning the machine. · Use saline (saltwater) nasal washes to help keep your nasal passages open and wash out mucus and bacteria. You can buy saline nose drops at a grocery store or drugstore. Or you can make your own at home by adding 1 teaspoon of salt and 1 teaspoon of baking soda to 2 cups of distilled water. If you make your own, fill a bulb syringe with the solution, insert the tip into your nostril, and squeeze gently. Daljit Westland your nose.   · Put a hot, wet towel or a warm gel pack on your face 3 or 4 times a day for 5 to 10 minutes each time. · Try a decongestant nasal spray like oxymetazoline (Afrin). Do not use it for more than 3 days in a row. Using it for more than 3 days can make your congestion worse. When should you call for help? Call your doctor now or seek immediate medical care if:  ? · You have new or worse swelling or redness in your face or around your eyes. ? · You have a new or higher fever. ? Watch closely for changes in your health, and be sure to contact your doctor if:  ? · You have new or worse facial pain. ? · The mucus from your nose becomes thicker (like pus) or has new blood in it. ? · You are not getting better as expected. Where can you learn more? Go to http://jonathon-margareth.info/. Enter A327 in the search box to learn more about \"Sinusitis: Care Instructions. \"  Current as of: May 12, 2017  Content Version: 11.4  © 3805-2863 [employer]wise[employer]. Care instructions adapted under license by [employer] (which disclaims liability or warranty for this information). If you have questions about a medical condition or this instruction, always ask your healthcare professional. [address] any warranty or liability for your use of this information.

## 2018-01-22 NOTE — MR AVS SNAPSHOT
[address] [facility] 
[phone] Patient: Myra Salinas MRN: [id number] Visit Information Date & Time Provider Department Dept. Phone Encounter #  
 1/22/2018  1:15 PM Ethan العلي Manjit [phone] [id number] Follow-up Instructions Return if symptoms worsen or fail to improve. Your Appointments 2/12/2018  2:00 PM  
New Patient with Rico Woodson MD  
[address]) Appt Note: NP referred by Dr. Aidee Bhat dx: trauma, PTSD, short term memory (told to arrive at 130pm & gave specific location) [facility] [address] [facility]  
[phone]  
  
   
 [facility] [address] [facility]  
  
    
 2/14/2018  2:30 PM  
COUNSELING with Fahad Banda LCSW [address] [place] [address]) Appt Note: NP for therapy, trauma, PTSD  
 [address] [facility] [phone]  
[facility] [address] [facility] Upcoming Health Maintenance Date Due  
 BREAST CANCER SCRN MAMMOGRAM 12/1/2016 COLONOSCOPY 11/28/2017 PAP AKA CERVICAL CYTOLOGY 11/19/2018 DTaP/Tdap/Td series (2 - Td) 6/30/2025 Allergies as of 1/22/2018  Review Complete On: 1/22/2018 By: Kell Knight MD  
  
 Severity Noted Reaction Type Reactions Bee Sting [Sting, Bee]  03/12/2012    Anaphylaxis Morphine  02/04/2016    Other (comments) Severe stomach cramps Vicodin [Hydrocodone-acetaminophen]    Hives Current Immunizations  Reviewed on 6/30/2015 Name Date Influenza Vaccine Velna Oats) 10/21/2016 Influenza Vaccine PF 11/13/2013 Influenza Vaccine Whole 10/1/2012 Tdap 6/30/2015  3:59 PM  
  
 Not reviewed this visit You Were Diagnosed With   
  
 Codes Comments Rhinosinusitis    -  Primary ICD-10-CM: J32.9 ICD-9-CM: 473.9 Vitals BP Pulse Temp Resp Height(growth percentile) Weight(growth percentile) 120/80 (BP 1 Location: Right arm, BP Patient Position: Sitting) 60 97.9 °F (36.6 °C) (Oral) 16 5' 5\" (1.651 m) 215 lb (97.5 kg) LMP SpO2 BMI OB Status Smoking Status 07/15/2011 95% 35.78 kg/m2 Postmenopausal Never Smoker Vitals History BMI and BSA Data Body Mass Index Body Surface Area 35.78 kg/m 2 2.11 m 2 Preferred Pharmacy Pharmacy Name Phone [address] [phone] Your Updated Medication List  
  
   
This list is accurate as of: 1/22/18  2:00 PM.  Always use your most recent med list.  
  
  
  
  
 albuterol 90 mcg/actuation inhaler Commonly known as:  PROAIR HFA Take 1 Puff by inhalation every four (4) hours as needed for Wheezing or Shortness of Breath. AMITRIPTYLINE PO Take  by mouth. Indications: unknown dosage, prescribed by her Psychiatrist  
  
 azithromycin 250 mg tablet Commonly known as:  Wendie Severance Take 2 tablets today, then take 1 tablet daily  
  
 dextroamphetamine-amphetamine 10 mg tablet Commonly known as:  ADDERALL Take 10 mg by mouth two (2) times a day. DULoxetine 60 mg capsule Commonly known as:  CYMBALTA TAKE ONE CAPSULE BY MOUTH TWICE DAILY FLEXERIL PO Take 10 mg by mouth Before breakfast, lunch, and dinner. FLUoxetine 40 mg capsule Commonly known as:  PROzac Take 40 mg by mouth daily. gabapentin 400 mg capsule Commonly known as:  NEURONTIN Take 2 Caps by mouth three (3) times daily as needed. hydrOXYzine HCl 25 mg tablet Commonly known as:  ATARAX Take 1 Tab by mouth three (3) times daily as needed for Itching. naproxen 500 mg tablet Commonly known as:  NAPROSYN  
TAKE ONE TABLET BY MOUTH TWO TIMES A DAY WITH FOOD  
  
 NUCYNTA  mg Tb12  
 Generic drug:  tapentadol Take  by mouth. * omeprazole 40 mg capsule Commonly known as:  PRILOSEC Take 1 Cap by mouth daily for 90 days. * omeprazole 40 mg capsule Commonly known as:  PRILOSEC  
  
 oxyCODONE IR 20 mg immediate release tablet Commonly known as:  Rafat Shireen Take  by mouth. oxyMORphone 10 mg Tab tablet Commonly known as:  OPANA Take 10 mg by mouth every six (6) hours as needed for Pain. promethazine 12.5 mg tablet Commonly known as:  PHENERGAN Take 1 Tab by mouth every eight (8) hours as needed for Nausea. terbinafine HCl 250 mg tablet Commonly known as:  LAMISIL Take 1 Tab by mouth daily. X 12 weeks. topiramate 50 mg tablet Commonly known as:  TOPAMAX  
  
 traZODone 100 mg tablet Commonly known as:  Josephine Shack Take 100 mg by mouth nightly. XANAX 1 mg tablet Generic drug:  ALPRAZolam  
Take  by mouth. ZOFRAN ODT 8 mg disintegrating tablet Generic drug:  ondansetron Take 8 mg by mouth every eight (8) hours as needed for Nausea. * Notice: This list has 2 medication(s) that are the same as other medications prescribed for you. Read the directions carefully, and ask your doctor or other care provider to review them with you. Prescriptions Sent to Pharmacy Refills  
 azithromycin (ZITHROMAX) 250 mg tablet 0 Sig: Take 2 tablets today, then take 1 tablet daily Class: Normal  
 Pharmacy: [address] #: [phone] Follow-up Instructions Return if symptoms worsen or fail to improve. Patient Instructions Sinusitis: Care Instructions Your Care Instructions Sinusitis is an infection of the lining of the sinus cavities in your head. Sinusitis often follows a cold. It causes pain and pressure in your head and face. In most cases, sinusitis gets better on its own in 1 to 2 weeks.  But some mild symptoms may last for several weeks. Sometimes antibiotics are needed. Follow-up care is a key part of your treatment and safety. Be sure to make and go to all appointments, and call your doctor if you are having problems. It's also a good idea to know your test results and keep a list of the medicines you take. How can you care for yourself at home? · Take an over-the-counter pain medicine, such as acetaminophen (Tylenol), ibuprofen (Advil, Motrin), or naproxen (Aleve). Read and follow all instructions on the label. · If the doctor prescribed antibiotics, take them as directed. Do not stop taking them just because you feel better. You need to take the full course of antibiotics. · Be careful when taking over-the-counter cold or flu medicines and Tylenol at the same time. Many of these medicines have acetaminophen, which is Tylenol. Read the labels to make sure that you are not taking more than the recommended dose. Too much acetaminophen (Tylenol) can be harmful. · Breathe warm, moist air from a steamy shower, a hot bath, or a sink filled with hot water. Avoid cold, dry air. Using a humidifier in your home may help. Follow the directions for cleaning the machine. · Use saline (saltwater) nasal washes to help keep your nasal passages open and wash out mucus and bacteria. You can buy saline nose drops at a grocery store or drugstore. Or you can make your own at home by adding 1 teaspoon of salt and 1 teaspoon of baking soda to 2 cups of distilled water. If you make your own, fill a bulb syringe with the solution, insert the tip into your nostril, and squeeze gently. Daljit Empire your nose. · Put a hot, wet towel or a warm gel pack on your face 3 or 4 times a day for 5 to 10 minutes each time. · Try a decongestant nasal spray like oxymetazoline (Afrin). Do not use it for more than 3 days in a row. Using it for more than 3 days can make your congestion worse. When should you call for help? Call your doctor now or seek immediate medical care if: 
? · You have new or worse swelling or redness in your face or around your eyes. ? · You have a new or higher fever. ? Watch closely for changes in your health, and be sure to contact your doctor if: 
? · You have new or worse facial pain. ? · The mucus from your nose becomes thicker (like pus) or has new blood in it. ? · You are not getting better as expected. Where can you learn more? Go to http://jonathon-margareth.info/. Enter D535 in the search box to learn more about \"Sinusitis: Care Instructions. \" Current as of: May 12, 2017 Content Version: 11.4 © 2434-0792 Amity. Care instructions adapted under license by BullGuard (which disclaims liability or warranty for this information). If you have questions about a medical condition or this instruction, always ask your healthcare professional. [address] any warranty or liability for your use of this information. Introducing [facility]! Dear Emiliano Carey: Thank you for requesting a Sendoid account. Our records indicate that you already have an active Sendoid account. You can access your account anytime at [email or web address] Did you know that you can access your hospital and ER discharge instructions at any time in Sendoid? You can also review all of your test results from your hospital stay or ER visit. Additional Information If you have questions, please visit the Frequently Asked Questions section of the Sendoid website at [email or web address] Remember, Sendoid is NOT to be used for urgent needs. For medical emergencies, dial 911. Now available from your iPhone and Android! Please provide this summary of care documentation to your next provider. Your primary care clinician is listed as Odessa Ryan.  If you have any questions after today's visit, please call [phone].

## 2018-01-29 ENCOUNTER — TELEPHONE (OUTPATIENT)
Dept: FAMILY MEDICINE CLINIC | Age: 58
End: 2018-01-29

## 2018-01-29 NOTE — TELEPHONE ENCOUNTER
Pt was calling and stated she needs doxycycline medication sent to [facility] DR HAJA VILLAFUERTE as she is not feeling any better

## 2018-01-30 ENCOUNTER — TELEPHONE (OUTPATIENT)
Dept: FAMILY MEDICINE CLINIC | Age: 58
End: 2018-01-30

## 2018-01-30 NOTE — TELEPHONE ENCOUNTER
Pt's insurance has been changed to [employer] for 2018 and pt needs insurance referrals. Pt called office 1/16/18 and needed insurance referral done for Estelle Bernstein 2/12/18 & David plasenciat 2/14/18 both behavioral health specialist. [employer] to do the insurance referrals via phone and web site and was informed both doctors are out of network. Pt has been called and notified of the issue and was informed to call hugo to see what she can do. No need to call pt.

## 2018-02-01 ENCOUNTER — TELEPHONE (OUTPATIENT)
Dept: FAMILY MEDICINE CLINIC | Age: 58
End: 2018-02-01

## 2018-02-01 DIAGNOSIS — E78.2 MIXED HYPERLIPIDEMIA: ICD-10-CM

## 2018-02-01 DIAGNOSIS — Z98.84 PERSONAL HISTORY OF GASTRIC BYPASS: Primary | ICD-10-CM

## 2018-02-01 DIAGNOSIS — R68.89 OTHER GENERAL SYMPTOMS AND SIGNS: ICD-10-CM

## 2018-02-01 DIAGNOSIS — E55.9 VITAMIN D DEFICIENCY: ICD-10-CM

## 2018-02-01 DIAGNOSIS — K92.89 OTHER SPECIFIED DISEASES OF THE DIGESTIVE SYSTEM: ICD-10-CM

## 2018-02-01 NOTE — TELEPHONE ENCOUNTER
Patient called and ask if Dr. Salena Hinson would go ahead and put in orders for blood work. She definitely wants to check blood sugar along with all the other panels that needs to be checked.   She stated that she would like to come in tomorrow but I told her to call before she came as we ask that you give 24 -48 hours for request.

## 2018-02-02 RX ORDER — DOXYCYCLINE 100 MG/1
100 TABLET ORAL 2 TIMES DAILY
Qty: 14 TAB | Refills: 0 | Status: SHIPPED | OUTPATIENT
Start: 2018-02-02 | End: 2018-02-09

## 2018-02-02 NOTE — TELEPHONE ENCOUNTER
Patient called regarding previous message. Patient also states that she finished the z-pack and is not feeling better. She has requested trying doxycyline, patient states this was discussed in previous appointment. Requesting a call back.     Best contact: [phone]

## 2018-02-02 NOTE — TELEPHONE ENCOUNTER
Patient states that her insurance has changed and she will need to transfer her full care to our office from [facility] [employer]. In addition, she reports continued headache, sinus pain/pressure. We did discuss doxycycline at her last office visit and I have sent Rx to her pharmacy. Advised to take mediation with a full glass of water and to remain upright for at least 2 hours after taking. Lastly, she has not had routine blood work in some time. She has history of gastric bypass surgery in the 90's and has not had her vitamin levels checks. Labs ordered and she will return next week to have performed. Orders Placed This Encounter    LIPID PANEL    CBC WITH AUTOMATED DIFF    IRON PROFILE    FERRITIN    METABOLIC PANEL, COMPREHENSIVE    VITAMIN D, 25 HYDROXY    VITAMIN B12 & FOLATE    doxycycline (ADOXA) 100 mg tablet     Sig: Take 1 Tab by mouth two (2) times a day for 7 days.      Dispense:  14 Tab     Refill:  0

## 2018-02-05 ENCOUNTER — HOSPITAL ENCOUNTER (OUTPATIENT)
Dept: LAB | Age: 58
Discharge: HOME | End: 2018-02-05
Payer: MEDICARE

## 2018-02-05 PROCEDURE — 85025 COMPLETE CBC W/AUTO DIFF WBC: CPT

## 2018-02-05 PROCEDURE — 36415 COLL VENOUS BLD VENIPUNCTURE: CPT

## 2018-02-05 PROCEDURE — 80061 LIPID PANEL: CPT

## 2018-02-05 PROCEDURE — 82728 ASSAY OF FERRITIN: CPT

## 2018-02-05 PROCEDURE — 82306 VITAMIN D 25 HYDROXY: CPT

## 2018-02-05 PROCEDURE — 82607 VITAMIN B-12: CPT

## 2018-02-05 PROCEDURE — 80053 COMPREHEN METABOLIC PANEL: CPT

## 2018-02-05 PROCEDURE — 83550 IRON BINDING TEST: CPT

## 2018-02-06 LAB
25(OH)D3+25(OH)D2 SERPL-MCNC: 27.2 NG/ML (ref 30–100)
ALBUMIN SERPL-MCNC: 3.6 G/DL (ref 3.5–5.5)
ALBUMIN/GLOB SERPL: 1.6 {RATIO} (ref 1.2–2.2)
ALP SERPL-CCNC: 192 IU/L (ref 39–117)
ALT SERPL-CCNC: 58 IU/L (ref 0–32)
AST SERPL-CCNC: 29 IU/L (ref 0–40)
BASOPHILS # BLD AUTO: 0 X10E3/UL (ref 0–0.2)
BASOPHILS NFR BLD AUTO: 0 %
BILIRUB SERPL-MCNC: 0.3 MG/DL (ref 0–1.2)
BUN SERPL-MCNC: 24 MG/DL (ref 6–24)
BUN/CREAT SERPL: 27 (ref 9–23)
CALCIUM SERPL-MCNC: 8.1 MG/DL (ref 8.7–10.2)
CHLORIDE SERPL-SCNC: 107 MMOL/L (ref 96–106)
CHOLEST SERPL-MCNC: 128 MG/DL (ref 100–199)
CO2 SERPL-SCNC: 21 MMOL/L (ref 18–29)
CREAT SERPL-MCNC: 0.89 MG/DL (ref 0.57–1)
EOSINOPHIL # BLD AUTO: 0 X10E3/UL (ref 0–0.4)
EOSINOPHIL NFR BLD AUTO: 0 %
ERYTHROCYTE [DISTWIDTH] IN BLOOD BY AUTOMATED COUNT: 14.4 % (ref 12.3–15.4)
FERRITIN SERPL-MCNC: 24 NG/ML (ref 15–150)
FOLATE SERPL-MCNC: >20 NG/ML
GFR SERPLBLD CREATININE-BSD FMLA CKD-EPI: 72 ML/MIN/1.73
GFR SERPLBLD CREATININE-BSD FMLA CKD-EPI: 83 ML/MIN/1.73
GLOBULIN SER CALC-MCNC: 2.2 G/DL (ref 1.5–4.5)
GLUCOSE SERPL-MCNC: 82 MG/DL (ref 65–99)
HCT VFR BLD AUTO: 40.7 % (ref 34–46.6)
HDLC SERPL-MCNC: 71 MG/DL
HGB BLD-MCNC: 13.2 G/DL (ref 11.1–15.9)
IMM GRANULOCYTES # BLD: 0 X10E3/UL (ref 0–0.1)
IMM GRANULOCYTES NFR BLD: 0 %
INTERPRETATION, 910389: NORMAL
IRON SATN MFR SERPL: 22 % (ref 15–55)
IRON SERPL-MCNC: 71 UG/DL (ref 27–159)
LDLC SERPL CALC-MCNC: 43 MG/DL (ref 0–99)
LYMPHOCYTES # BLD AUTO: 2.1 X10E3/UL (ref 0.7–3.1)
LYMPHOCYTES NFR BLD AUTO: 43 %
MCH RBC QN AUTO: 28.4 PG (ref 26.6–33)
MCHC RBC AUTO-ENTMCNC: 32.4 G/DL (ref 31.5–35.7)
MCV RBC AUTO: 88 FL (ref 79–97)
MONOCYTES # BLD AUTO: 0.5 X10E3/UL (ref 0.1–0.9)
MONOCYTES NFR BLD AUTO: 11 %
NEUTROPHILS # BLD AUTO: 2.2 X10E3/UL (ref 1.4–7)
NEUTROPHILS NFR BLD AUTO: 46 %
PLATELET # BLD AUTO: 171 X10E3/UL (ref 150–379)
POTASSIUM SERPL-SCNC: 4.6 MMOL/L (ref 3.5–5.2)
PROT SERPL-MCNC: 5.8 G/DL (ref 6–8.5)
RBC # BLD AUTO: 4.65 X10E6/UL (ref 3.77–5.28)
SODIUM SERPL-SCNC: 143 MMOL/L (ref 134–144)
TIBC SERPL-MCNC: 326 UG/DL (ref 250–450)
TRIGL SERPL-MCNC: 68 MG/DL (ref 0–149)
UIBC SERPL-MCNC: 255 UG/DL (ref 131–425)
VIT B12 SERPL-MCNC: 224 PG/ML (ref 232–1245)
VLDLC SERPL CALC-MCNC: 14 MG/DL (ref 5–40)
WBC # BLD AUTO: 4.8 X10E3/UL (ref 3.4–10.8)

## 2018-02-08 ENCOUNTER — TELEPHONE (OUTPATIENT)
Dept: FAMILY MEDICINE CLINIC | Age: 58
End: 2018-02-08

## 2018-02-08 NOTE — TELEPHONE ENCOUNTER
Pt would like a call back about lab results and would also like a copy to give to another provider please call

## 2018-02-15 NOTE — TELEPHONE ENCOUNTER
Patient called wanting to know lab results and also have a copy sent out to address.     Best contact: [phone]

## 2018-02-20 ENCOUNTER — OFFICE VISIT (OUTPATIENT)
Dept: FAMILY MEDICINE CLINIC | Age: 58
End: 2018-02-20

## 2018-02-20 VITALS
WEIGHT: 222 LBS | TEMPERATURE: 97.9 F | HEIGHT: 65 IN | SYSTOLIC BLOOD PRESSURE: 137 MMHG | OXYGEN SATURATION: 98 % | DIASTOLIC BLOOD PRESSURE: 88 MMHG | BODY MASS INDEX: 36.99 KG/M2 | RESPIRATION RATE: 18 BRPM | HEART RATE: 65 BPM

## 2018-02-20 DIAGNOSIS — J01.01 ACUTE RECURRENT MAXILLARY SINUSITIS: Primary | ICD-10-CM

## 2018-02-20 RX ORDER — PROMETHAZINE HYDROCHLORIDE 25 MG/1
TABLET ORAL
COMMUNITY
Start: 2018-02-19 | End: 2018-04-09

## 2018-02-20 RX ORDER — OXYCODONE HYDROCHLORIDE 10 MG/1
TABLET ORAL
COMMUNITY
Start: 2018-01-31 | End: 2018-05-09

## 2018-02-20 RX ORDER — CEFDINIR 300 MG/1
300 CAPSULE ORAL 2 TIMES DAILY
Qty: 20 CAP | Refills: 0 | Status: SHIPPED | OUTPATIENT
Start: 2018-02-20 | End: 2018-03-02

## 2018-02-20 NOTE — PROGRESS NOTES
HISTORY OF PRESENT ILLNESS  Austin Esparza is a 62 y.o. female. HPI  Pt presents with \"cough, headache, and sinus pain\"    Pt states that she has been battling the sinus congestion, for about a month  Prior to this, she believes that she had the flu  She then seemed to get better for a couple of days, and symptoms have worsened again  Sinus pain/pressure  Nasal congestion  Cough:productive and dry cough  Fever: T max 101.3  No nausea, no vomiting, no diarrhea    OTC: Sudafed, Mucinex  Review of Systems   Constitutional: Positive for fever. HENT: Positive for congestion and sinus pain. Respiratory: Positive for cough and sputum production. Gastrointestinal: Negative for abdominal pain, diarrhea, nausea and vomiting. Physical Exam   Constitutional: She is oriented to person, place, and time. She appears well-developed and well-nourished. HENT:   Head: Normocephalic and atraumatic. Right Ear: Hearing, tympanic membrane, external ear and ear canal normal.   Left Ear: Hearing, tympanic membrane, external ear and ear canal normal.   Nose: Mucosal edema and rhinorrhea present. Right sinus exhibits maxillary sinus tenderness and frontal sinus tenderness. Left sinus exhibits maxillary sinus tenderness and frontal sinus tenderness. Mouth/Throat: Oropharynx is clear and moist.   Neck: Normal range of motion. Neck supple. Cardiovascular: Normal rate, regular rhythm and normal heart sounds. Pulmonary/Chest: Effort normal and breath sounds normal.   Lymphadenopathy:     She has no cervical adenopathy. Neurological: She is alert and oriented to person, place, and time. Skin: Skin is warm and dry. Psychiatric: She has a normal mood and affect. Her behavior is normal.       ASSESSMENT and PLAN    ICD-10-CM ICD-9-CM    1.  Acute recurrent maxillary sinusitis J01.01 461.0 cefdinir (OMNICEF) 300 mg capsule     Informed patient that I have sent medication to the pharmacy, and she should take as prescribed. Educated about staying well hydrated, by pushing fluids as much as possible. Educated about monitoring fever, and treating as needed. Pt informed to return to office with worsening of symptoms, or PRN with any questions or concerns. Pt verbalizes understanding of plan of care and denies further questions or concerns at this time.

## 2018-02-20 NOTE — PROGRESS NOTES
Chief Complaint   Patient presents with    Cough     started feeling sick 4 weeks ago. still having some cough and more sinus congestion    Headache    Sinus Pain     \"REVIEWED RECORD IN PREPARATION FOR VISIT AND HAVE OBTAINED THE NECESSARY DOCUMENTATION\"  1. Have you been to the ER, urgent care clinic since your last visit? Hospitalized since your last visit? No    2. Have you seen or consulted any other health care providers outside of the [address] [place] [address] since your last visit? Include any pap smears or colon screening. No  Patient does not have advanced directives.

## 2018-02-20 NOTE — MR AVS SNAPSHOT
[address] [facility] 
 
 
 [address] [facility] 
[phone] Patient: Danny Contreras MRN: [id number] Visit Information Date & Time Provider Department Dept. Phone Encounter #  
 2/20/2018  2:30 PM Yuliya Velasquez  [address] [phone] [id number] Follow-up Instructions Return if symptoms worsen or fail to improve. Upcoming Health Maintenance Date Due  
 BREAST CANCER SCRN MAMMOGRAM 12/1/2016 COLONOSCOPY 11/28/2017 PAP AKA CERVICAL CYTOLOGY 11/19/2018 DTaP/Tdap/Td series (2 - Td) 6/30/2025 Allergies as of 2/20/2018  Review Complete On: 2/20/2018 By: Yuliya Velasquez NP Severity Noted Reaction Type Reactions Bee Sting [Sting, Bee]  03/12/2012    Anaphylaxis Morphine  02/04/2016    Other (comments) Severe stomach cramps Vicodin [Hydrocodone-acetaminophen]    Hives Current Immunizations  Reviewed on 6/30/2015 Name Date Influenza Vaccine Peggi Alexey) 10/21/2016 Influenza Vaccine PF 11/13/2013 Influenza Vaccine Whole 10/1/2012 Tdap 6/30/2015  3:59 PM  
  
 Not reviewed this visit You Were Diagnosed With   
  
 Codes Comments Acute recurrent maxillary sinusitis    -  Primary ICD-10-CM: J01.01 
ICD-9-CM: 461.0 Vitals BP Pulse Temp Resp Height(growth percentile) Weight(growth percentile) 137/88 (BP 1 Location: Right arm, BP Patient Position: Sitting) 65 97.9 °F (36.6 °C) (Oral) 18 5' 5\" (1.651 m) 222 lb (100.7 kg) LMP SpO2 BMI OB Status Smoking Status 07/15/2011 98% 36.94 kg/m2 Postmenopausal Never Smoker BMI and BSA Data Body Mass Index Body Surface Area  
 36.94 kg/m 2 2.15 m 2 Preferred Pharmacy Pharmacy Name Phone [address] [phone] Your Updated Medication List  
  
   
This list is accurate as of: 2/20/18  2:46 PM.  Always use your most recent med list.  
  
  
  
  
 albuterol 90 mcg/actuation inhaler Commonly known as:  PROAIR HFA Take 1 Puff by inhalation every four (4) hours as needed for Wheezing or Shortness of Breath. AMITRIPTYLINE PO Take  by mouth. Indications: unknown dosage, prescribed by her Psychiatrist  
  
 cefdinir 300 mg capsule Commonly known as:  OMNICEF Take 1 Cap by mouth two (2) times a day for 10 days. dextroamphetamine-amphetamine 10 mg tablet Commonly known as:  ADDERALL Take 10 mg by mouth two (2) times a day. DULoxetine 60 mg capsule Commonly known as:  CYMBALTA TAKE ONE CAPSULE BY MOUTH TWICE DAILY FLEXERIL PO Take 10 mg by mouth Before breakfast, lunch, and dinner. FLUoxetine 40 mg capsule Commonly known as:  PROzac Take 40 mg by mouth daily. gabapentin 400 mg capsule Commonly known as:  NEURONTIN Take 2 Caps by mouth three (3) times daily as needed. hydrOXYzine HCl 25 mg tablet Commonly known as:  ATARAX Take 1 Tab by mouth three (3) times daily as needed for Itching. naproxen 500 mg tablet Commonly known as:  NAPROSYN  
TAKE ONE TABLET BY MOUTH TWO TIMES A DAY WITH FOOD  
  
 NUCYNTA  mg Tb12 Generic drug:  tapentadol Take  by mouth. * omeprazole 40 mg capsule Commonly known as:  PRILOSEC Take 1 Cap by mouth daily for 90 days. * omeprazole 40 mg capsule Commonly known as:  PRILOSEC  
  
 * oxyCODONE IR 20 mg immediate release tablet Commonly known as:  Berry Leitz Take  by mouth. * oxyCODONE IR 10 mg Tab immediate release tablet Commonly known as:  ROXICODONE  
  
 oxyMORphone 10 mg Tab tablet Commonly known as:  OPANA Take 10 mg by mouth every six (6) hours as needed for Pain. * promethazine 12.5 mg tablet Commonly known as:  PHENERGAN Take 1 Tab by mouth every eight (8) hours as needed for Nausea. * promethazine 25 mg tablet Commonly known as:  PHENERGAN  
  
 terbinafine HCl 250 mg tablet Commonly known as:  LAMISIL Take 1 Tab by mouth daily. X 12 weeks. topiramate 50 mg tablet Commonly known as:  TOPAMAX  
  
 traZODone 100 mg tablet Commonly known as:  Bonnita Genre Take 100 mg by mouth nightly. XANAX 1 mg tablet Generic drug:  ALPRAZolam  
Take  by mouth. ZOFRAN ODT 8 mg disintegrating tablet Generic drug:  ondansetron Take 8 mg by mouth every eight (8) hours as needed for Nausea. * Notice: This list has 6 medication(s) that are the same as other medications prescribed for you. Read the directions carefully, and ask your doctor or other care provider to review them with you. Prescriptions Sent to Pharmacy Refills  
 cefdinir (OMNICEF) 300 mg capsule 0 Sig: Take 1 Cap by mouth two (2) times a day for 10 days. Class: Normal  
 Pharmacy: [facility] DR HAJA VILLAFUERTE [address] [place] [address] #: [phone] Route: Oral  
  
Follow-up Instructions Return if symptoms worsen or fail to improve. Patient Instructions Sinusitis: Care Instructions Your Care Instructions Sinusitis is an infection of the lining of the sinus cavities in your head. Sinusitis often follows a cold. It causes pain and pressure in your head and face. In most cases, sinusitis gets better on its own in 1 to 2 weeks. But some mild symptoms may last for several weeks. Sometimes antibiotics are needed. Follow-up care is a key part of your treatment and safety. Be sure to make and go to all appointments, and call your doctor if you are having problems. It's also a good idea to know your test results and keep a list of the medicines you take. How can you care for yourself at home? · Take an over-the-counter pain medicine, such as acetaminophen (Tylenol), ibuprofen (Advil, Motrin), or naproxen (Aleve). Read and follow all instructions on the label. · If the doctor prescribed antibiotics, take them as directed. Do not stop taking them just because you feel better. You need to take the full course of antibiotics. · Be careful when taking over-the-counter cold or flu medicines and Tylenol at the same time. Many of these medicines have acetaminophen, which is Tylenol. Read the labels to make sure that you are not taking more than the recommended dose. Too much acetaminophen (Tylenol) can be harmful. · Breathe warm, moist air from a steamy shower, a hot bath, or a sink filled with hot water. Avoid cold, dry air. Using a humidifier in your home may help. Follow the directions for cleaning the machine. · Use saline (saltwater) nasal washes to help keep your nasal passages open and wash out mucus and bacteria. You can buy saline nose drops at a grocery store or drugstore. Or you can make your own at home by adding 1 teaspoon of salt and 1 teaspoon of baking soda to 2 cups of distilled water. If you make your own, fill a bulb syringe with the solution, insert the tip into your nostril, and squeeze gently. Rendon Rideau your nose. · Put a hot, wet towel or a warm gel pack on your face 3 or 4 times a day for 5 to 10 minutes each time. · Try a decongestant nasal spray like oxymetazoline (Afrin). Do not use it for more than 3 days in a row. Using it for more than 3 days can make your congestion worse. When should you call for help? Call your doctor now or seek immediate medical care if: 
? · You have new or worse swelling or redness in your face or around your eyes. ? · You have a new or higher fever. ? Watch closely for changes in your health, and be sure to contact your doctor if: 
? · You have new or worse facial pain. ? · The mucus from your nose becomes thicker (like pus) or has new blood in it. ? · You are not getting better as expected. Where can you learn more? Go to http://jonathon-margareth.info/. Enter E967 in the search box to learn more about \"Sinusitis: Care Instructions. \" Current as of: May 12, 2017 Content Version: 11.4 © 4906-4022 BlueWare. Care instructions adapted under license by Snappli (which disclaims liability or warranty for this information). If you have questions about a medical condition or this instruction, always ask your healthcare professional. [address] any warranty or liability for your use of this information. Introducing [facility]! Dear Francis Allison: Thank you for requesting a [employer] account. Our records indicate that you already have an active [employer] account. You can access your account anytime at [email or web address] Did you know that you can access your hospital and ER discharge instructions at any time in [employer]? You can also review all of your test results from your hospital stay or ER visit. Additional Information If you have questions, please visit the Frequently Asked Questions section of the [employer] website at [email or web address] Remember, [employer] is NOT to be used for urgent needs. For medical emergencies, dial 911. Now available from your iPhone and Android! Please provide this summary of care documentation to your next provider. Your primary care clinician is listed as Odessa Ryan. If you have any questions after today's visit, please call [phone].

## 2018-02-20 NOTE — PATIENT INSTRUCTIONS
Sinusitis: Care Instructions  Your Care Instructions    Sinusitis is an infection of the lining of the sinus cavities in your head. Sinusitis often follows a cold. It causes pain and pressure in your head and face. In most cases, sinusitis gets better on its own in 1 to 2 weeks. But some mild symptoms may last for several weeks. Sometimes antibiotics are needed. Follow-up care is a key part of your treatment and safety. Be sure to make and go to all appointments, and call your doctor if you are having problems. It's also a good idea to know your test results and keep a list of the medicines you take. How can you care for yourself at home? · Take an over-the-counter pain medicine, such as acetaminophen (Tylenol), ibuprofen (Advil, Motrin), or naproxen (Aleve). Read and follow all instructions on the label. · If the doctor prescribed antibiotics, take them as directed. Do not stop taking them just because you feel better. You need to take the full course of antibiotics. · Be careful when taking over-the-counter cold or flu medicines and Tylenol at the same time. Many of these medicines have acetaminophen, which is Tylenol. Read the labels to make sure that you are not taking more than the recommended dose. Too much acetaminophen (Tylenol) can be harmful. · Breathe warm, moist air from a steamy shower, a hot bath, or a sink filled with hot water. Avoid cold, dry air. Using a humidifier in your home may help. Follow the directions for cleaning the machine. · Use saline (saltwater) nasal washes to help keep your nasal passages open and wash out mucus and bacteria. You can buy saline nose drops at a grocery store or drugstore. Or you can make your own at home by adding 1 teaspoon of salt and 1 teaspoon of baking soda to 2 cups of distilled water. If you make your own, fill a bulb syringe with the solution, insert the tip into your nostril, and squeeze gently. Jodell Chi your nose.   · Put a hot, wet towel or a warm gel pack on your face 3 or 4 times a day for 5 to 10 minutes each time. · Try a decongestant nasal spray like oxymetazoline (Afrin). Do not use it for more than 3 days in a row. Using it for more than 3 days can make your congestion worse. When should you call for help? Call your doctor now or seek immediate medical care if:  ? · You have new or worse swelling or redness in your face or around your eyes. ? · You have a new or higher fever. ? Watch closely for changes in your health, and be sure to contact your doctor if:  ? · You have new or worse facial pain. ? · The mucus from your nose becomes thicker (like pus) or has new blood in it. ? · You are not getting better as expected. Where can you learn more? Go to http://jonathon-margareth.info/. Enter O863 in the search box to learn more about \"Sinusitis: Care Instructions. \"  Current as of: May 12, 2017  Content Version: 11.4  © 0360-3682 Healthwise, Incorporated. Care instructions adapted under license by [employer] (which disclaims liability or warranty for this information). If you have questions about a medical condition or this instruction, always ask your healthcare professional. [address] any warranty or liability for your use of this information.

## 2018-03-08 ENCOUNTER — TELEPHONE (OUTPATIENT)
Dept: FAMILY MEDICINE CLINIC | Age: 58
End: 2018-03-08

## 2018-03-08 NOTE — TELEPHONE ENCOUNTER
Pt needs a referral to go to the [facility] for exercises and pool therapy and a doctors release that it is okay for pt to do this as well - please call pt with questions - call pt when letter ready for pickup

## 2018-03-08 NOTE — LETTER
3/14/2018 Ms[address] [phone] To Whom It May Concern, My patient Ms. Miesha Gomez has no current unstable medical problems that are a contraindication to participating in an exercise or resistance-training program and aquatic therapy. I approve of and support her participation in these programs. Sincerely, Zev Sams MD

## 2018-03-14 NOTE — TELEPHONE ENCOUNTER
Letter has been written. Patient called at home number with no answer. LVM message on her personal VM advising that letter is ready for .

## 2018-03-26 DIAGNOSIS — M54.50 CHRONIC MIDLINE LOW BACK PAIN WITHOUT SCIATICA: ICD-10-CM

## 2018-03-26 DIAGNOSIS — G89.29 CHRONIC MIDLINE LOW BACK PAIN WITHOUT SCIATICA: ICD-10-CM

## 2018-03-27 ENCOUNTER — TELEPHONE (OUTPATIENT)
Dept: FAMILY MEDICINE CLINIC | Age: 58
End: 2018-03-27

## 2018-03-27 RX ORDER — GABAPENTIN 400 MG/1
CAPSULE ORAL
Qty: 180 CAP | Refills: 3 | Status: SHIPPED | OUTPATIENT
Start: 2018-03-27 | End: 2019-01-25 | Stop reason: SDUPTHER

## 2018-03-27 NOTE — TELEPHONE ENCOUNTER
She is coming in 3/29/18 and will have mammogram next week. Could you put a order in for mammogram or MRI.  Please see 12/1/14 mammogram.

## 2018-03-30 DIAGNOSIS — Z12.39 SCREENING FOR BREAST CANCER: Primary | ICD-10-CM

## 2018-03-30 NOTE — TELEPHONE ENCOUNTER
I have placed mammo order. Should MRI be warranted at that time, will refer to breast clinic for proper assessment. Thanks!

## 2018-04-09 ENCOUNTER — TELEPHONE (OUTPATIENT)
Dept: FAMILY MEDICINE CLINIC | Age: 58
End: 2018-04-09

## 2018-04-09 RX ORDER — ONDANSETRON 8 MG/1
8 TABLET, ORALLY DISINTEGRATING ORAL
Qty: 20 TAB | Refills: 0 | Status: SHIPPED | OUTPATIENT
Start: 2018-04-09 | End: 2018-05-09 | Stop reason: SDUPTHER

## 2018-05-09 ENCOUNTER — OFFICE VISIT (OUTPATIENT)
Dept: FAMILY MEDICINE CLINIC | Age: 58
End: 2018-05-09

## 2018-05-09 VITALS
SYSTOLIC BLOOD PRESSURE: 124 MMHG | BODY MASS INDEX: 37.32 KG/M2 | WEIGHT: 224 LBS | RESPIRATION RATE: 18 BRPM | OXYGEN SATURATION: 97 % | DIASTOLIC BLOOD PRESSURE: 84 MMHG | HEIGHT: 65 IN | TEMPERATURE: 98.7 F | HEART RATE: 87 BPM

## 2018-05-09 DIAGNOSIS — Z20.818 STREP THROAT EXPOSURE: ICD-10-CM

## 2018-05-09 DIAGNOSIS — J06.9 URI WITH COUGH AND CONGESTION: Primary | ICD-10-CM

## 2018-05-09 DIAGNOSIS — Z91.030 BEE STING ALLERGY: ICD-10-CM

## 2018-05-09 DIAGNOSIS — Z23 NEED FOR SHINGLES VACCINE: ICD-10-CM

## 2018-05-09 DIAGNOSIS — R79.89 ABNORMAL LIVER FUNCTION TESTS: ICD-10-CM

## 2018-05-09 DIAGNOSIS — F41.9 ANXIETY: ICD-10-CM

## 2018-05-09 LAB
S PYO AG THROAT QL: NEGATIVE
VALID INTERNAL CONTROL?: YES

## 2018-05-09 RX ORDER — ALPRAZOLAM 1 MG/1
1 TABLET ORAL
Status: CANCELLED | OUTPATIENT
Start: 2018-05-09

## 2018-05-09 RX ORDER — AMOXICILLIN AND CLAVULANATE POTASSIUM 875; 125 MG/1; MG/1
1 TABLET, FILM COATED ORAL EVERY 12 HOURS
Qty: 20 TAB | Refills: 0 | Status: SHIPPED | OUTPATIENT
Start: 2018-05-09 | End: 2018-05-19

## 2018-05-09 RX ORDER — AMITRIPTYLINE HYDROCHLORIDE 50 MG/1
50 TABLET, FILM COATED ORAL DAILY
Qty: 90 TAB | Refills: 1 | Status: SHIPPED | OUTPATIENT
Start: 2018-05-09 | End: 2018-10-22

## 2018-05-09 RX ORDER — PREDNISONE 20 MG/1
20 TABLET ORAL 2 TIMES DAILY
Qty: 10 TAB | Refills: 0 | Status: SHIPPED | OUTPATIENT
Start: 2018-05-09 | End: 2018-06-25 | Stop reason: SDUPTHER

## 2018-05-09 RX ORDER — ONDANSETRON 8 MG/1
8 TABLET, ORALLY DISINTEGRATING ORAL
Qty: 20 TAB | Refills: 0 | Status: SHIPPED | OUTPATIENT
Start: 2018-05-09 | End: 2018-06-19 | Stop reason: SDUPTHER

## 2018-05-09 RX ORDER — NAPROXEN 500 MG/1
500 TABLET ORAL
Qty: 60 TAB | Refills: 2 | Status: SHIPPED | OUTPATIENT
Start: 2018-05-09 | End: 2018-06-05

## 2018-05-09 NOTE — PATIENT INSTRUCTIONS
Upper Respiratory Infection (Cold): Care Instructions  Your Care Instructions    An upper respiratory infection, or URI, is an infection of the nose, sinuses, or throat. URIs are spread by coughs, sneezes, and direct contact. The common cold is the most frequent kind of URI. The flu and sinus infections are other kinds of URIs. Almost all URIs are caused by viruses. Antibiotics won't cure them. But you can treat most infections with home care. This may include drinking lots of fluids and taking over-the-counter pain medicine. You will probably feel better in 4 to 10 days. The doctor has checked you carefully, but problems can develop later. If you notice any problems or new symptoms, get medical treatment right away. Follow-up care is a key part of your treatment and safety. Be sure to make and go to all appointments, and call your doctor if you are having problems. It's also a good idea to know your test results and keep a list of the medicines you take. How can you care for yourself at home? · To prevent dehydration, drink plenty of fluids, enough so that your urine is light yellow or clear like water. Choose water and other caffeine-free clear liquids until you feel better. If you have kidney, heart, or liver disease and have to limit fluids, talk with your doctor before you increase the amount of fluids you drink. · Take an over-the-counter pain medicine, such as acetaminophen (Tylenol), ibuprofen (Advil, Motrin), or naproxen (Aleve). Read and follow all instructions on the label. · Before you use cough and cold medicines, check the label. These medicines may not be safe for young children or for people with certain health problems. · Be careful when taking over-the-counter cold or flu medicines and Tylenol at the same time. Many of these medicines have acetaminophen, which is Tylenol. Read the labels to make sure that you are not taking more than the recommended dose.  Too much acetaminophen (Tylenol) can be harmful. · Get plenty of rest.  · Do not smoke or allow others to smoke around you. If you need help quitting, talk to your doctor about stop-smoking programs and medicines. These can increase your chances of quitting for good. When should you call for help? Call 911 anytime you think you may need emergency care. For example, call if:  ? · You have severe trouble breathing. ?Call your doctor now or seek immediate medical care if:  ? · You seem to be getting much sicker. ? · You have new or worse trouble breathing. ? · You have a new or higher fever. ? · You have a new rash. ? Watch closely for changes in your health, and be sure to contact your doctor if:  ? · You have a new symptom, such as a sore throat, an earache, or sinus pain. ? · You cough more deeply or more often, especially if you notice more mucus or a change in the color of your mucus. ? · You do not get better as expected. Where can you learn more? Go to http://jonathon-margareth.info/. Enter P470 in the search box to learn more about \"Upper Respiratory Infection (Cold): Care Instructions. \"  Current as of: May 12, 2017  Content Version: 11.4  © 7125-5456 Healthwise, Incorporated. Care instructions adapted under license by [employer] (which disclaims liability or warranty for this information). If you have questions about a medical condition or this instruction, always ask your healthcare professional. [place] [address] any warranty or liability for your use of this information.

## 2018-05-09 NOTE — MR AVS SNAPSHOT
[address] [place] [address] [address] [facility] 
[phone] Patient: Yehuda aTng MRN: [id number] Visit Information Date & Time Provider Department Dept. Phone Encounter #  
 5/9/2018  9:30 AM Ethan Palmer Manjit [phone] [id number] Follow-up Instructions Return if symptoms worsen or fail to improve. Upcoming Health Maintenance Date Due  
 BREAST CANCER SCRN MAMMOGRAM 12/1/2016 COLONOSCOPY 11/28/2017 MEDICARE YEARLY EXAM 3/26/2018 Influenza Age 5 to Adult 8/1/2018 PAP AKA CERVICAL CYTOLOGY 11/19/2018 DTaP/Tdap/Td series (2 - Td) 6/30/2025 Allergies as of 5/9/2018  Review Complete On: 5/9/2018 By: Gunjan Henderson MD  
  
 Severity Noted Reaction Type Reactions Bee Sting [Sting, Bee]  03/12/2012    Anaphylaxis Morphine  02/04/2016    Other (comments) Severe stomach cramps Vicodin [Hydrocodone-acetaminophen]    Hives Current Immunizations  Reviewed on 5/9/2018 Name Date Influenza Vaccine Nevin Shane) 10/21/2016 Influenza Vaccine PF 11/13/2013 Influenza Vaccine Whole 10/1/2012 Tdap 6/30/2015  3:59 PM  
  
 Reviewed by Gunjan Henderson MD on 5/9/2018 at 10:26 AM  
You Were Diagnosed With   
  
 Codes Comments URI with cough and congestion    -  Primary ICD-10-CM: J06.9 ICD-9-CM: 465.9 Strep throat exposure     ICD-10-CM: R69.033 ICD-9-CM: V01.89 Anxiety     ICD-10-CM: F41.9 ICD-9-CM: 300.00 Bee sting allergy     ICD-10-CM: Z91.038 
ICD-9-CM: V15.06 Abnormal liver function tests     ICD-10-CM: R94.5 ICD-9-CM: 790.6 Need for shingles vaccine     ICD-10-CM: U63 ICD-9-CM: V04.89 Vitals BP Pulse Temp Resp Height(growth percentile) Weight(growth percentile) 124/84 (BP 1 Location: Right arm, BP Patient Position: Sitting) 87 98.7 °F (37.1 °C) (Oral) 18 5' 5\" (1.651 m) 224 lb (101.6 kg) LMP SpO2 BMI OB Status Smoking Status 07/15/2011 97% 37.28 kg/m2 Postmenopausal Never Smoker Vitals History BMI and BSA Data Body Mass Index Body Surface Area  
 37.28 kg/m 2 2.16 m 2 Preferred Pharmacy Pharmacy Name Phone Tariq Mojica [facility] [phone] Your Updated Medication List  
  
   
This list is accurate as of 5/9/18 10:26 AM.  Always use your most recent med list.  
  
  
  
  
 amitriptyline 50 mg tablet Commonly known as:  ELAVIL Take 1 Tab by mouth daily. amoxicillin-clavulanate 875-125 mg per tablet Commonly known as:  AUGMENTIN Take 1 Tab by mouth every twelve (12) hours for 10 days. dextroamphetamine-amphetamine 10 mg tablet Commonly known as:  ADDERALL Take 10 mg by mouth two (2) times a day. DULoxetine 60 mg capsule Commonly known as:  CYMBALTA TAKE ONE CAPSULE BY MOUTH TWICE DAILY FLEXERIL PO Take 10 mg by mouth Before breakfast, lunch, and dinner. FLUoxetine 40 mg capsule Commonly known as:  PROzac Take 40 mg by mouth daily. gabapentin 400 mg capsule Commonly known as:  NEURONTIN  
TAKE TWO CAPSULES BY MOUTH THREE TIMES DAILY AS NEEDED  
  
 hydrOXYzine HCl 25 mg tablet Commonly known as:  ATARAX Take 1 Tab by mouth three (3) times daily as needed for Itching. naproxen 500 mg tablet Commonly known as:  NAPROSYN Take 1 Tab by mouth two (2) times daily as needed. Take with medicine. omeprazole 40 mg capsule Commonly known as:  PRILOSEC Take 1 Cap by mouth daily for 90 days. ondansetron 8 mg disintegrating tablet Commonly known as:  ZOFRAN ODT Take 1 Tab by mouth every eight (8) hours as needed for Nausea. oxyCODONE IR 20 mg immediate release tablet Commonly known as:  Capone Seats Take 20 mg by mouth every four (4) hours as needed. predniSONE 20 mg tablet Commonly known as:  Hayden Orozco  
 Take 1 Tab by mouth two (2) times a day for 5 days. varicella-zoster recombinant (PF) 50 mcg/0.5 mL Susr injection Commonly known as:  SHINGRIX (PF)  
0.5 mL by IntraMUSCular route once for 1 dose. Indications: PREVENTION OF HERPES ZOSTER  
  
 XANAX 1 mg tablet Generic drug:  ALPRAZolam  
Take 1 mg by mouth three (3) times daily as needed. Prescriptions Printed Refills  
 varicella-zoster recombinant, PF, (SHINGRIX, PF,) 50 mcg/0.5 mL susr injection 0 Si.5 mL by IntraMUSCular route once for 1 dose. Indications: PREVENTION OF HERPES ZOSTER Class: Print Route: IntraMUSCular Prescriptions Sent to Pharmacy Refills  
 naproxen (NAPROSYN) 500 mg tablet 2 Sig: Take 1 Tab by mouth two (2) times daily as needed. Take with medicine. Class: Normal  
 Pharmacy: [address] [place] [address] Ph #: [phone] Route: Oral  
 ondansetron (ZOFRAN ODT) 8 mg disintegrating tablet 0 Sig: Take 1 Tab by mouth every eight (8) hours as needed for Nausea. Class: Normal  
 Pharmacy: [address] [place] [address] Ph #: [phone] Route: Oral  
 amitriptyline (ELAVIL) 50 mg tablet 1 Sig: Take 1 Tab by mouth daily. Class: Normal  
 Pharmacy: [address] [place] [address] Ph #: [phone] Route: Oral  
 predniSONE (DELTASONE) 20 mg tablet 0 Sig: Take 1 Tab by mouth two (2) times a day for 5 days. Class: Normal  
 Pharmacy: [address] [place] [address] Ph #: [phone] Route: Oral  
 amoxicillin-clavulanate (AUGMENTIN) 875-125 mg per tablet 0 Sig: Take 1 Tab by mouth every twelve (12) hours for 10 days. Class: Normal  
 Pharmacy: [address] [place] [address] Ph #: [phone] Route: Oral  
  
We Performed the Following AMB POC RAPID STREP A [19020 CPT(R)] HEPATIC FUNCTION PANEL [08635 CPT(R)] Follow-up Instructions Return if symptoms worsen or fail to improve. Patient Instructions Upper Respiratory Infection (Cold): Care Instructions Your Care Instructions An upper respiratory infection, or URI, is an infection of the nose, sinuses, or throat. URIs are spread by coughs, sneezes, and direct contact. The common cold is the most frequent kind of URI. The flu and sinus infections are other kinds of URIs. Almost all URIs are caused by viruses. Antibiotics won't cure them. But you can treat most infections with home care. This may include drinking lots of fluids and taking over-the-counter pain medicine. You will probably feel better in 4 to 10 days. The doctor has checked you carefully, but problems can develop later. If you notice any problems or new symptoms, get medical treatment right away. Follow-up care is a key part of your treatment and safety. Be sure to make and go to all appointments, and call your doctor if you are having problems. It's also a good idea to know your test results and keep a list of the medicines you take. How can you care for yourself at home? · To prevent dehydration, drink plenty of fluids, enough so that your urine is light yellow or clear like water. Choose water and other caffeine-free clear liquids until you feel better. If you have kidney, heart, or liver disease and have to limit fluids, talk with your doctor before you increase the amount of fluids you drink. · Take an over-the-counter pain medicine, such as acetaminophen (Tylenol), ibuprofen (Advil, Motrin), or naproxen (Aleve). Read and follow all instructions on the label. · Before you use cough and cold medicines, check the label. These medicines may not be safe for young children or for people with certain health problems. · Be careful when taking over-the-counter cold or flu medicines and Tylenol at the same time.  Many of these medicines have acetaminophen, which is Tylenol. Read the labels to make sure that you are not taking more than the recommended dose. Too much acetaminophen (Tylenol) can be harmful. · Get plenty of rest. 
· Do not smoke or allow others to smoke around you. If you need help quitting, talk to your doctor about stop-smoking programs and medicines. These can increase your chances of quitting for good. When should you call for help? Call 911 anytime you think you may need emergency care. For example, call if: 
? · You have severe trouble breathing. ?Call your doctor now or seek immediate medical care if: 
? · You seem to be getting much sicker. ? · You have new or worse trouble breathing. ? · You have a new or higher fever. ? · You have a new rash. ? Watch closely for changes in your health, and be sure to contact your doctor if: 
? · You have a new symptom, such as a sore throat, an earache, or sinus pain. ? · You cough more deeply or more often, especially if you notice more mucus or a change in the color of your mucus. ? · You do not get better as expected. Where can you learn more? Go to http://jonathon-margareth.info/. Enter I517 in the search box to learn more about \"Upper Respiratory Infection (Cold): Care Instructions. \" Current as of: May 12, 2017 Content Version: 11.4 © 9806-7777 [employer]. Care instructions adapted under license by [employer] (which disclaims liability or warranty for this information). If you have questions about a medical condition or this instruction, always ask your healthcare professional. [place] [address] any warranty or liability for your use of this information. Introducing [facility]! Dear Ronnell Zacarias: Thank you for requesting a [employer] account. Our records indicate that you already have an active [employer] account. You can access your account anytime at [email or web address] Did you know that you can access your hospital and ER discharge instructions at any time in [employer]? You can also review all of your test results from your hospital stay or ER visit. Additional Information If you have questions, please visit the Frequently Asked Questions section of the [employer] website at [email or web address] Remember, [employer] is NOT to be used for urgent needs. For medical emergencies, dial 911. Now available from your iPhone and Android! Please provide this summary of care documentation to your next provider. Your primary care clinician is listed as Odessa Ryan. If you have any questions after today's visit, please call [phone].

## 2018-05-09 NOTE — PROGRESS NOTES
Identified pt with two pt identifiers(name and ). Chief Complaint   Patient presents with    Generalized Body Aches     Symptms began Saturday    Cough    Nasal Congestion    Medication Evaluation     Would like refill of prednisone 20mg. on for four days last week. Ended on  or Friday. Health Maintenance Due   Topic    BREAST CANCER SCRN MAMMOGRAM     COLONOSCOPY     MEDICARE YEARLY EXAM        Wt Readings from Last 3 Encounters:   18 224 lb (101.6 kg)   18 222 lb (100.7 kg)   18 215 lb (97.5 kg)     Temp Readings from Last 3 Encounters:   18 98.7 °F (37.1 °C) (Oral)   18 97.9 °F (36.6 °C) (Oral)   18 97.9 °F (36.6 °C) (Oral)     BP Readings from Last 3 Encounters:   18 124/84   18 137/88   18 120/80     Pulse Readings from Last 3 Encounters:   18 87   18 65   18 60         Learning Assessment:  :     Learning Assessment 2016 2015 2015 1/3/2014   PRIMARY LEARNER Patient Patient Patient Patient   HIGHEST LEVEL OF EDUCATION - PRIMARY LEARNER  - - - 4 YEARS OF COLLEGE   BARRIERS PRIMARY LEARNER - NONE - COGNITIVE   CO-LEARNER CAREGIVER - No - -   PRIMARY LANGUAGE ENGLISH ENGLISH ENGLISH ENGLISH   LEARNER PREFERENCE PRIMARY DEMONSTRATION DEMONSTRATION VIDEOS READING     - - - VIDEOS   ANSWERED BY patient  patient patient patient   RELATIONSHIP SELF SELF SELF SELF       Depression Screening:  :     No flowsheet data found. Fall Risk Assessment:  :     No flowsheet data found. Abuse Screening:  :     Abuse Screening Questionnaire 2015 3/10/2014   Do you ever feel afraid of your partner? N N   Are you in a relationship with someone who physically or mentally threatens you? N N   Is it safe for you to go home?  Y Y       Coordination of Care Questionnaire:  :     1) Have you been to an emergency room, urgent care clinic since your last visit? no   Hospitalized since your last visit? no             2) Have you seen or consulted any other health care providers outside of [address] [place] [address] since your last visit? yes Dr. Roby Hubbard for Lymphedema,   (Include any pap smears or colon screenings in this section.)    3) Do you have an Advance Directive on file? no  Are you interested in receiving information about Advance Directives? no    Reviewed record in preparation for visit and have obtained necessary documentation. Medication reconciliation up to date and corrected with patient at this time.
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
## 2018-05-10 LAB
ALBUMIN SERPL-MCNC: 4.1 G/DL (ref 3.5–5.5)
ALP SERPL-CCNC: 124 IU/L (ref 39–117)
ALT SERPL-CCNC: 32 IU/L (ref 0–32)
AST SERPL-CCNC: 26 IU/L (ref 0–40)
BILIRUB DIRECT SERPL-MCNC: 0.28 MG/DL (ref 0–0.4)
BILIRUB SERPL-MCNC: 0.6 MG/DL (ref 0–1.2)
PROT SERPL-MCNC: 6.5 G/DL (ref 6–8.5)

## 2018-05-23 ENCOUNTER — TELEPHONE (OUTPATIENT)
Dept: FAMILY MEDICINE CLINIC | Age: 58
End: 2018-05-23

## 2018-05-31 ENCOUNTER — APPOINTMENT (OUTPATIENT)
Dept: GENERAL RADIOLOGY | Age: 58
End: 2018-05-31
Attending: EMERGENCY MEDICINE
Payer: MEDICARE

## 2018-05-31 ENCOUNTER — HOSPITAL ENCOUNTER (EMERGENCY)
Age: 58
Discharge: HOME | End: 2018-05-31
Attending: EMERGENCY MEDICINE | Admitting: EMERGENCY MEDICINE
Payer: MEDICARE

## 2018-05-31 VITALS
OXYGEN SATURATION: 98 % | BODY MASS INDEX: 36.65 KG/M2 | SYSTOLIC BLOOD PRESSURE: 121 MMHG | WEIGHT: 220 LBS | DIASTOLIC BLOOD PRESSURE: 73 MMHG | HEART RATE: 80 BPM | TEMPERATURE: 99.3 F | RESPIRATION RATE: 16 BRPM | HEIGHT: 65 IN

## 2018-05-31 DIAGNOSIS — S51.832A PUNCTURE WOUND OF LEFT FOREARM, INITIAL ENCOUNTER: Primary | ICD-10-CM

## 2018-05-31 PROCEDURE — 72100 X-RAY EXAM L-S SPINE 2/3 VWS: CPT

## 2018-05-31 PROCEDURE — 73090 X-RAY EXAM OF FOREARM: CPT

## 2018-05-31 PROCEDURE — 74011250636 HC RX REV CODE- 250/636: Performed by: EMERGENCY MEDICINE

## 2018-05-31 PROCEDURE — 74011250637 HC RX REV CODE- 250/637: Performed by: EMERGENCY MEDICINE

## 2018-05-31 PROCEDURE — 96372 THER/PROPH/DIAG INJ SC/IM: CPT

## 2018-05-31 PROCEDURE — A4565 SLINGS: HCPCS

## 2018-05-31 PROCEDURE — 99284 EMERGENCY DEPT VISIT MOD MDM: CPT

## 2018-05-31 PROCEDURE — 74011000250 HC RX REV CODE- 250

## 2018-05-31 RX ORDER — IBUPROFEN 600 MG/1
600 TABLET ORAL
Qty: 20 TAB | Refills: 0 | Status: SHIPPED | OUTPATIENT
Start: 2018-05-31 | End: 2018-06-05

## 2018-05-31 RX ORDER — BACITRACIN 500 UNIT/G
PACKET (EA) TOPICAL
Status: COMPLETED
Start: 2018-05-31 | End: 2018-05-31

## 2018-05-31 RX ORDER — CEPHALEXIN 250 MG/1
500 CAPSULE ORAL 3 TIMES DAILY
Qty: 42 CAP | Refills: 0 | Status: SHIPPED | OUTPATIENT
Start: 2018-05-31 | End: 2018-06-05

## 2018-05-31 RX ORDER — HYDROMORPHONE HYDROCHLORIDE 2 MG/1
2 TABLET ORAL
Status: COMPLETED | OUTPATIENT
Start: 2018-05-31 | End: 2018-05-31

## 2018-05-31 RX ORDER — KETOROLAC TROMETHAMINE 30 MG/ML
60 INJECTION, SOLUTION INTRAMUSCULAR; INTRAVENOUS
Status: COMPLETED | OUTPATIENT
Start: 2018-05-31 | End: 2018-05-31

## 2018-05-31 RX ORDER — BACITRACIN 500 UNIT/G
1 PACKET (EA) TOPICAL
Status: COMPLETED | OUTPATIENT
Start: 2018-05-31 | End: 2018-05-31

## 2018-05-31 RX ADMIN — Medication 1 PACKET: at 16:38

## 2018-05-31 RX ADMIN — KETOROLAC TROMETHAMINE 60 MG: 30 INJECTION, SOLUTION INTRAMUSCULAR at 15:43

## 2018-05-31 RX ADMIN — HYDROMORPHONE HYDROCHLORIDE 2 MG: 2 TABLET ORAL at 16:20

## 2018-05-31 RX ADMIN — BACITRACIN 1 PACKET: 500 OINTMENT TOPICAL at 16:38

## 2018-05-31 NOTE — ED NOTES
Bacitracin and nonstick dressing applied. Pt's left arm placed in sling as ordered. The patient was discharged home by Dr. Malick Evans and Venkatesh Arango RN in stable condition, accompanied by spouse . The patient is alert and oriented, is in no respiratory distress. The patient's diagnosis, condition and treatment were explained to patient or parent/guardian. The patient/responsible party expressed understanding. No prescriptions given to pt. No work/school note given to pt. A discharge plan has been developed. A  was not involved in the process. Aftercare instructions were given to the patient.

## 2018-05-31 NOTE — DISCHARGE INSTRUCTIONS
Puncture Wounds: Care Instructions  Your Care Instructions    A puncture wound can happen anywhere on your body. These wounds tend to be narrower and deeper than cuts. A puncture wound is usually left open instead of being closed. This is because a puncture wound can be easily infected, and closing it can make infection even more likely. You will probably have a bandage over the wound. The doctor has checked you carefully, but problems can develop later. If you notice any problems or new symptoms, get medical treatment right away. Follow-up care is a key part of your treatment and safety. Be sure to make and go to all appointments, and call your doctor if you are having problems. It's also a good idea to know your test results and keep a list of the medicines you take. How can you care for yourself at home? · Keep the wound dry for the first 24 to 48 hours. After this, you can shower if your doctor okays it. Pat the wound dry. · Don't soak the wound, such as in a bathtub. Your doctor will tell you when it's safe to get the wound wet. · If your doctor told you how to care for your wound, follow your doctor's instructions. If you did not get instructions, follow this general advice:  ¨ After the first 24 to 48 hours, wash the wound with clean water 2 times a day. Don't use hydrogen peroxide or alcohol, which can slow healing. ¨ You may cover the wound with a thin layer of petroleum jelly, such as Vaseline, and a nonstick bandage. ¨ Apply more petroleum jelly and replace the bandage as needed. · Prop up the sore area on pillows anytime you sit or lie down during the next 3 days. Try to keep it above the level of your heart. This helps reduce swelling. · Avoid any activity that could cause your wound to get worse. · Be safe with medicines. Read and follow all instructions on the label. ¨ If the doctor gave you a prescription medicine for pain, take it as prescribed.   ¨ If you are not taking a prescription pain medicine, ask your doctor if you can take an over-the-counter medicine. · If your doctor prescribed antibiotics, take them as directed. Do not stop taking them just because you feel better. You need to take the full course of antibiotics. When should you call for help? Call your doctor now or seek immediate medical care if:  ? · You have new pain, or your pain gets worse. ? · The wound starts to bleed, and blood soaks through the bandage. Oozing small amounts of blood is normal.   ? · The skin near the wound is cold or pale or changes color. ? · You have tingling, weakness, or numbness near the wound. ? · You have trouble moving the area near the wound. ? · You have symptoms of infection, such as:  ¨ Increased pain, swelling, warmth, or redness around the wound. ¨ Red streaks leading from the wound. ¨ Pus draining from the wound. ¨ A fever. ? Watch closely for changes in your health, and be sure to contact your doctor if:  ? · The wound is not closing (getting smaller). ? · You do not get better as expected. Where can you learn more? Go to http://jonathon-margareth.info/. Enter Z701 in the search box to learn more about \"Puncture Wounds: Care Instructions. \"  Current as of: March 20, 2017  Content Version: 11.4  © 8633-3980 [employer]. Care instructions adapted under license by [employer] (which disclaims liability or warranty for this information). If you have questions about a medical condition or this instruction, always ask your healthcare professional. [place] [address] any warranty or liability for your use of this information. We hope that we have addressed all of your medical concerns. The examination and treatment you received in the Emergency Department were for an emergent problem and were not intended as complete care.  It is important that you follow up with your healthcare provider(s) for ongoing care. If your symptoms worsen or do not improve as expected, and you are unable to reach your usual health care provider(s), you should return to the Emergency Department. Today's healthcare is undergoing tremendous change, and patient satisfaction surveys are one of the many tools to assess the quality of medical care. You may receive a survey from the [employer] regarding your experience in the Emergency Department. I hope that your experience has been completely positive, particularly the medical care that I provided. As such, please participate in the survey; anything less than excellent does not meet my expectations or intentions. [address] [place] [address] and [address] [place] [address] participate in nationally recognized quality of care measures. If your blood pressure is greater than 120/80, as reported below, we urge that you seek medical care to address the potential of high blood pressure, commonly known as hypertension. Hypertension can be hereditary or can be caused by certain medical conditions, pain, stress, or \"white coat syndrome. \"       Please make an appointment with your health care provider(s) for follow up of your Emergency Department visit. VITALS:   Patient Vitals for the past 8 hrs:   Temp Pulse Resp BP SpO2   05/31/18 1538 99.3 °F (37.4 °C) 96 22 (!) 134/103 98 %          Thank you for allowing us to provide you with medical care today. We realize that you have many choices for your emergency care needs. Please choose us in the future for any continued health care needs. Regards,           Denver Maze, MD    [address] [place] [address].   Office: [phone]            No results found for this or any previous visit (from the past 24 hour(s)). Xr Spine Lumb 2 Or 3 V    Result Date: 5/31/2018  History: Back pain and fall. 3 views of the lumbosacral spine were performed. There is disc space narrowing at L5-S1.  There is mild anterolisthesis of L4-L5 with mild disc space narrowing. There is uncinate process hypertrophy and facet arthropathy. The vertebral body heights are maintained. There are clips in the abdomen. IMPRESSION: No acute findings. Xr Forearm Lt Ap/lat    Result Date: 5/31/2018  EXAM:  XR FOREARM LT AP/LAT INDICATION:   puncture wound from metal rake, L forearm. COMPARISON: None. FINDINGS: Two views of the left radius and ulna demonstrate no fracture or other acute osseous, articular or soft tissue abnormality. There is spurring about the elbow joint. IMPRESSION:  No acute abnormality.

## 2018-05-31 NOTE — ED NOTES
2 puncture wounds noted to pt's left forearm 5cm apart. Cleansed with wound cleanser. Pt tolerated well.
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
## 2018-05-31 NOTE — ED TRIAGE NOTES
Pt reports that she tripped over a brick in the yard and impaled a two prongs of a rake in her left forearm.   Bleeding controlled on arrival.

## 2018-06-05 ENCOUNTER — OFFICE VISIT (OUTPATIENT)
Dept: OBGYN CLINIC | Age: 58
End: 2018-06-05

## 2018-06-05 VITALS
DIASTOLIC BLOOD PRESSURE: 68 MMHG | SYSTOLIC BLOOD PRESSURE: 130 MMHG | BODY MASS INDEX: 36.92 KG/M2 | WEIGHT: 221.6 LBS | HEIGHT: 65 IN

## 2018-06-05 DIAGNOSIS — N94.10 DYSPAREUNIA, FEMALE: Primary | ICD-10-CM

## 2018-06-05 NOTE — PATIENT INSTRUCTIONS
Pelvic Exam: Care Instructions  Your Care Instructions    When your doctor examines all of your pelvic organs, it's called a pelvic exam. Two good reasons to have this kind of exam are to check for sexually transmitted infections (STIs) and to get a Pap test. A Pap test is also called a Pap smear. It checks for early changes that can lead to cancer of the cervix. Sometimes a pelvic exam is part of a regular checkup. In this case, you can do some things to make your test results as accurate as possible. · Try to schedule the exam when you don't have your period. · Don't use douches, tampons, or vaginal medicines, sprays, or powders for 24 hours before your exam.  · Don't have sex for 24 hours before your exam.  Other times, women have this kind of exam at any time of the month. This is because they have pelvic pain, bleeding, or discharge. Or they may have another pelvic problem. Before your exam, it's important to share some information with your doctor. For example, if you are a survivor of rape or sexual abuse, you can talk about any concerns you may have. Your doctor will also want to know if you are pregnant or use birth control. And he or she will want to hear about any problems, surgeries, or procedures you have had in your pelvic area. You will also need to tell your doctor when your last period was. Follow-up care is a key part of your treatment and safety. Be sure to make and go to all appointments, and call your doctor if you are having problems. It's also a good idea to know your test results and keep a list of the medicines you take. How is a pelvic exam done? · During a pelvic exam, you will:  ¨ Take off your clothes below the waist. You will get a paper or cloth cover to put over the lower half of your body. [place] on your back on an exam table. Your feet will be raised above you. Stirrups will support your feet. · The doctor will:  [place] Mode you to relax your knees.  Your knees need to lean out, toward the walls. ¨ Check the opening of your vagina for sores or swelling. ¨ Gently put a tool called a speculum into your vagina. It opens the vagina a little bit. You will feel some pressure. But if you are relaxed, it will not hurt. It lets your doctor see inside the vagina. ¨ Use a small brush, spatula, or swab to get a sample of cells, if you are having a Pap test or culture. The doctor then removes the speculum. ¨ Put on gloves and put one or two fingers of one hand into your vagina. The other hand goes on your lower belly. This lets your doctor feel your pelvic organs. You will probably feel some pressure. Try to stay relaxed. ¨ Put one gloved finger into your rectum and one into your vagina, if needed. This can also help check your pelvic organs. This exam takes about 10 minutes. At the end, you will get a washcloth or tissue to clean your vaginal area. It's normal to have some discharge after this exam. You can then get dressed. Some test results may be ready right away. But results from a culture or a Pap test may take several days or a few weeks. Why should you have a pelvic exam?  · You want to have recommended screening tests. This includes a Pap test.  · You think you have a vaginal infection. Signs include itching, burning, or unusual discharge. · You might have been exposed to a sexually transmitted infection (STI), such as chlamydia or herpes. · You have vaginal bleeding that is not part of your normal menstrual period. · You have pain in your belly or pelvis. · You have been sexually assaulted. A pelvic exam lets your doctor collect evidence and check for STIs. · You are pregnant. · You are having trouble getting pregnant. What are the risks of a pelvic exam?  There are no risks from a pelvic exam.  When should you call for help? Watch closely for changes in your health, and be sure to contact your doctor if you have any problems. Where can you learn more?   Go to http://jonathon-margareth.info/. Enter K420 in the search box to learn more about \"Pelvic Exam: Care Instructions. \"  Current as of: October 13, 2016  Content Version: 11.4  © 2736-2801 [employer], [employer]. Care instructions adapted under license by [employer] (which disclaims liability or warranty for this information). If you have questions about a medical condition or this instruction, always ask your healthcare professional. [place] [address] any warranty or liability for your use of this information.

## 2018-06-05 NOTE — MR AVS SNAPSHOT
[facility] Zoila Reza [address] [place] [address] 
[phone] Patient: Jeramy Carmichael MRN: [id number]:[id number] Visit Information Date & Time Provider Department Dept. Phone Encounter #  
 6/5/2018  2:20 PM Bethany Gonzalez MD [facility] [phone] Upcoming Health Maintenance Date Due  
 BREAST CANCER SCRN MAMMOGRAM 12/1/2016 COLONOSCOPY 11/28/2017 Influenza Age 5 to Adult 8/1/2018 PAP AKA CERVICAL CYTOLOGY 11/19/2018 Allergies as of 6/5/2018  Review Complete On: 6/5/2018 By: Kajal Montesinos Severity Noted Reaction Type Reactions Bee Sting [Sting, Bee]  03/12/2012    Anaphylaxis Morphine  02/04/2016    Other (comments) Severe stomach cramps Vicodin [Hydrocodone-acetaminophen]    Hives Current Immunizations  Reviewed on 5/9/2018 Name Date Influenza Vaccine Galajeane Gideon) 10/21/2016 Influenza Vaccine PF 11/13/2013 Influenza Vaccine Whole 10/1/2012 Tdap 6/30/2015  3:59 PM  
  
 Not reviewed this visit Vitals BP Height(growth percentile) Weight(growth percentile) LMP BMI OB Status 130/68 (BP 1 Location: Right arm, BP Patient Position: Sitting) 5' 5\" (1.651 m) 221 lb 9.6 oz (100.5 kg) 07/15/2011 36.88 kg/m2 Postmenopausal  
 Smoking Status Never Smoker BMI and BSA Data Body Mass Index Body Surface Area  
 36.88 kg/m 2 2.15 m 2 Preferred Pharmacy Pharmacy Name Phone [address] [phone] Your Updated Medication List  
  
   
This list is accurate as of 6/5/18  2:42 PM.  Always use your most recent med list.  
  
  
  
  
 amitriptyline 50 mg tablet Commonly known as:  ELAVIL Take 1 Tab by mouth daily. cephALEXin 250 mg capsule Commonly known as:  Yolette Crigler Take 2 Caps by mouth three (3) times daily for 7 days. dextroamphetamine-amphetamine 10 mg tablet Commonly known as:  ADDERALL Take 10 mg by mouth two (2) times a day. DULoxetine 60 mg capsule Commonly known as:  CYMBALTA TAKE ONE CAPSULE BY MOUTH TWICE DAILY FLEXERIL PO Take 10 mg by mouth Before breakfast, lunch, and dinner. FLUoxetine 40 mg capsule Commonly known as:  PROzac Take 40 mg by mouth daily. gabapentin 400 mg capsule Commonly known as:  NEURONTIN  
TAKE TWO CAPSULES BY MOUTH THREE TIMES DAILY AS NEEDED  
  
 hydrOXYzine HCl 25 mg tablet Commonly known as:  ATARAX Take 1 Tab by mouth three (3) times daily as needed for Itching. ibuprofen 600 mg tablet Commonly known as:  MOTRIN Take 1 Tab by mouth every six (6) hours as needed for Pain. naproxen 500 mg tablet Commonly known as:  NAPROSYN Take 1 Tab by mouth two (2) times daily as needed. Take with medicine. omeprazole 40 mg capsule Commonly known as:  PRILOSEC Take 1 Cap by mouth daily for 90 days. ondansetron 8 mg disintegrating tablet Commonly known as:  ZOFRAN ODT Take 1 Tab by mouth every eight (8) hours as needed for Nausea. oxyCODONE IR 20 mg immediate release tablet Commonly known as:  Rodrigo Laser Take 20 mg by mouth every four (4) hours as needed. XANAX 1 mg tablet Generic drug:  ALPRAZolam  
Take 1 mg by mouth three (3) times daily as needed. Patient Instructions Pelvic Exam: Care Instructions Your Care Instructions When your doctor examines all of your pelvic organs, it's called a pelvic exam. Two good reasons to have this kind of exam are to check for sexually transmitted infections (STIs) and to get a Pap test. A Pap test is also called a Pap smear. It checks for early changes that can lead to cancer of the cervix. Sometimes a pelvic exam is part of a regular checkup. In this case, you can do some things to make your test results as accurate as possible. · Try to schedule the exam when you don't have your period. · Don't use douches, tampons, or vaginal medicines, sprays, or powders for 24 hours before your exam. 
· Don't have sex for 24 hours before your exam. 
Other times, women have this kind of exam at any time of the month. This is because they have pelvic pain, bleeding, or discharge. Or they may have another pelvic problem. Before your exam, it's important to share some information with your doctor. For example, if you are a survivor of rape or sexual abuse, you can talk about any concerns you may have. Your doctor will also want to know if you are pregnant or use birth control. And he or she will want to hear about any problems, surgeries, or procedures you have had in your pelvic area. You will also need to tell your doctor when your last period was. Follow-up care is a key part of your treatment and safety. Be sure to make and go to all appointments, and call your doctor if you are having problems. It's also a good idea to know your test results and keep a list of the medicines you take. How is a pelvic exam done? · During a pelvic exam, you will: ¨ Take off your clothes below the waist. You will get a paper or cloth cover to put over the lower half of your body. Terressa Gutter on your back on an exam table. Your feet will be raised above you. Stirrups will support your feet. · The doctor will: ¨ Ask you to relax your knees. Your knees need to lean out, toward the walls. ¨ Check the opening of your vagina for sores or swelling. ¨ Gently put a tool called a speculum into your vagina. It opens the vagina a little bit. You will feel some pressure. But if you are relaxed, it will not hurt. It lets your doctor see inside the vagina. ¨ Use a small brush, spatula, or swab to get a sample of cells, if you are having a Pap test or culture. The doctor then removes the speculum. ¨ Put on gloves and put one or two fingers of one hand into your vagina. The other hand goes on your lower belly. This lets your doctor feel your pelvic organs. You will probably feel some pressure. Try to stay relaxed. ¨ Put one gloved finger into your rectum and one into your vagina, if needed. This can also help check your pelvic organs. This exam takes about 10 minutes. At the end, you will get a washcloth or tissue to clean your vaginal area. It's normal to have some discharge after this exam. You can then get dressed. Some test results may be ready right away. But results from a culture or a Pap test may take several days or a few weeks. Why should you have a pelvic exam? 
· You want to have recommended screening tests. This includes a Pap test. 
· You think you have a vaginal infection. Signs include itching, burning, or unusual discharge. · You might have been exposed to a sexually transmitted infection (STI), such as chlamydia or herpes. · You have vaginal bleeding that is not part of your normal menstrual period. · You have pain in your belly or pelvis. · You have been sexually assaulted. A pelvic exam lets your doctor collect evidence and check for STIs. · You are pregnant. · You are having trouble getting pregnant. What are the risks of a pelvic exam? 
There are no risks from a pelvic exam. 
When should you call for help? Watch closely for changes in your health, and be sure to contact your doctor if you have any problems. Where can you learn more? Go to http://jonathon-margareth.info/. Enter K689 in the search box to learn more about \"Pelvic Exam: Care Instructions. \" Current as of: October 13, 2016 Content Version: 11.4 © 3197-9732 [employer]. Care instructions adapted under license by [employer] (which disclaims liability or warranty for this information).  If you have questions about a medical condition or this instruction, always ask your healthcare professional. [employer] disclaims any warranty or liability for your use of this information. Introducing \[facility] [place] [facility]\"" & HEALTH SERVICES! Dear Charlena Fleischer: Thank you for requesting a [employer] account. Our records indicate that you already have an active [employer] account. You can access your account anytime at [email or web address] Did you know that you can access your hospital and ER discharge instructions at any time in [employer]? You can also review all of your test results from your hospital stay or ER visit. Additional Information If you have questions, please visit the Frequently Asked Questions section of the [employer] website at [email or web address] Remember, [employer] is NOT to be used for urgent needs. For medical emergencies, dial 911. Now available from your iPhone and Android! Please provide this summary of care documentation to your next provider. Your primary care clinician is listed as Odessa Ryan. If you have any questions after today's visit, please call [phone].
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
## 2018-06-21 RX ORDER — ONDANSETRON 8 MG/1
TABLET, ORALLY DISINTEGRATING ORAL
Qty: 20 TAB | Refills: 0 | Status: SHIPPED | OUTPATIENT
Start: 2018-06-21 | End: 2018-08-03 | Stop reason: SDUPTHER

## 2018-06-25 ENCOUNTER — TELEPHONE (OUTPATIENT)
Dept: NEUROLOGY | Age: 58
End: 2018-06-25

## 2018-06-25 DIAGNOSIS — Z91.030 BEE STING ALLERGY: ICD-10-CM

## 2018-06-25 NOTE — TELEPHONE ENCOUNTER
----- Message from [facility] sent at 6/25/2018 12:33 PM EDT -----  Regarding: Dr. Fei Montana  Pt [phone] says that her pain mngnt doc is not accepting her insurance so she wants to know if Dr. Anup Oshea will prescribe the following meds: oxycodone, wellbutrin, adderall, flexaril, and gabapentin. Adv pt has been adv that we do not treat fibromyalia.

## 2018-06-25 NOTE — TELEPHONE ENCOUNTER
Contacted Ms Willy Betancur and informed her Dr German Raza does not manage pain medication. Recommended she contact her PCP for further recommendation on a provider that can accommodate her needs.

## 2018-06-26 ENCOUNTER — OFFICE VISIT (OUTPATIENT)
Dept: FAMILY MEDICINE CLINIC | Age: 58
End: 2018-06-26

## 2018-06-26 VITALS
DIASTOLIC BLOOD PRESSURE: 88 MMHG | OXYGEN SATURATION: 98 % | SYSTOLIC BLOOD PRESSURE: 118 MMHG | RESPIRATION RATE: 18 BRPM | TEMPERATURE: 98 F | HEART RATE: 90 BPM | WEIGHT: 217 LBS | BODY MASS INDEX: 36.15 KG/M2 | HEIGHT: 65 IN

## 2018-06-26 DIAGNOSIS — M54.50 CHRONIC MIDLINE LOW BACK PAIN WITHOUT SCIATICA: ICD-10-CM

## 2018-06-26 DIAGNOSIS — R42 VERTIGO: ICD-10-CM

## 2018-06-26 DIAGNOSIS — F98.8 ATTENTION DEFICIT DISORDER, UNSPECIFIED HYPERACTIVITY PRESENCE: ICD-10-CM

## 2018-06-26 DIAGNOSIS — I89.0 LYMPHEDEMA OF BOTH LOWER EXTREMITIES: ICD-10-CM

## 2018-06-26 DIAGNOSIS — R35.0 URINARY FREQUENCY: ICD-10-CM

## 2018-06-26 DIAGNOSIS — R39.9 UTI SYMPTOMS: Primary | ICD-10-CM

## 2018-06-26 DIAGNOSIS — G89.29 CHRONIC MIDLINE LOW BACK PAIN WITHOUT SCIATICA: ICD-10-CM

## 2018-06-26 DIAGNOSIS — F41.9 ANXIETY: ICD-10-CM

## 2018-06-26 LAB
BILIRUB UR QL STRIP: ABNORMAL
GLUCOSE UR-MCNC: ABNORMAL MG/DL
KETONES P FAST UR STRIP-MCNC: ABNORMAL MG/DL
PH UR STRIP: 5 [PH] (ref 4.6–8)
PROT UR QL STRIP: NEGATIVE
SP GR UR STRIP: 1.02 (ref 1–1.03)
UA UROBILINOGEN AMB POC: ABNORMAL (ref 0.2–1)
URINALYSIS CLARITY POC: ABNORMAL
URINALYSIS COLOR POC: ABNORMAL
URINE BLOOD POC: NEGATIVE
URINE LEUKOCYTES POC: ABNORMAL
URINE NITRITES POC: POSITIVE

## 2018-06-26 RX ORDER — ALPRAZOLAM 1 MG/1
1.5 TABLET ORAL
Qty: 90 TAB | Refills: 0 | Status: SHIPPED | OUTPATIENT
Start: 2018-06-26 | End: 2018-08-17 | Stop reason: SDUPTHER

## 2018-06-26 RX ORDER — TIZANIDINE 2 MG/1
2 TABLET ORAL 3 TIMES DAILY
Qty: 90 TAB | Refills: 2 | Status: SHIPPED | OUTPATIENT
Start: 2018-06-26 | End: 2018-08-17 | Stop reason: SDUPTHER

## 2018-06-26 RX ORDER — MECLIZINE HYDROCHLORIDE 25 MG/1
25 TABLET ORAL
Qty: 60 TAB | Refills: 1 | Status: SHIPPED | OUTPATIENT
Start: 2018-06-26 | End: 2019-03-18

## 2018-06-26 RX ORDER — EPINEPHRINE 0.3 MG/.3ML
INJECTION SUBCUTANEOUS
COMMUNITY
End: 2020-04-14 | Stop reason: SDUPTHER

## 2018-06-26 RX ORDER — SULFAMETHOXAZOLE AND TRIMETHOPRIM 800; 160 MG/1; MG/1
1 TABLET ORAL 2 TIMES DAILY
Qty: 10 TAB | Refills: 0 | Status: SHIPPED | OUTPATIENT
Start: 2018-06-26 | End: 2018-07-01

## 2018-06-26 RX ORDER — NAPROXEN SODIUM 500 MG/1
500 TABLET, FILM COATED, EXTENDED RELEASE ORAL DAILY
COMMUNITY
End: 2018-06-26 | Stop reason: ALTCHOICE

## 2018-06-26 RX ORDER — IBUPROFEN 600 MG/1
600 TABLET ORAL
Qty: 60 TAB | Refills: 2 | Status: SHIPPED | OUTPATIENT
Start: 2018-06-26 | End: 2018-12-06 | Stop reason: SDUPTHER

## 2018-06-26 RX ORDER — DEXTROAMPHETAMINE SACCHARATE, AMPHETAMINE ASPARTATE, DEXTROAMPHETAMINE SULFATE AND AMPHETAMINE SULFATE 3.75; 3.75; 3.75; 3.75 MG/1; MG/1; MG/1; MG/1
15 TABLET ORAL 2 TIMES DAILY
Qty: 60 TAB | Refills: 0 | Status: SHIPPED | OUTPATIENT
Start: 2018-06-26 | End: 2018-07-25

## 2018-06-26 RX ORDER — BUMETANIDE 0.5 MG/1
0.5 TABLET ORAL DAILY
Qty: 90 TAB | Refills: 1 | Status: SHIPPED | OUTPATIENT
Start: 2018-06-26 | End: 2019-04-21 | Stop reason: SDUPTHER

## 2018-06-26 NOTE — PROGRESS NOTES
Identified pt with two pt identifiers(name and ). Chief Complaint   Patient presents with    Urgency     symptoms began saturday    Pain (Chronic)     Dr. Pb Summers can no longer prescribe meds. Health Maintenance Due   Topic    BREAST CANCER SCRN MAMMOGRAM     COLONOSCOPY        Wt Readings from Last 3 Encounters:   18 217 lb (98.4 kg)   18 221 lb 9.6 oz (100.5 kg)   18 220 lb (99.8 kg)     Temp Readings from Last 3 Encounters:   18 98 °F (36.7 °C) (Oral)   18 99.3 °F (37.4 °C)   18 98.7 °F (37.1 °C) (Oral)     BP Readings from Last 3 Encounters:   18 118/88   18 130/68   18 121/73     Pulse Readings from Last 3 Encounters:   18 90   18 80   18 87         Learning Assessment:  :     Learning Assessment 2016 2015 2015 1/3/2014   PRIMARY LEARNER Patient Patient Patient Patient   HIGHEST LEVEL OF EDUCATION - PRIMARY LEARNER  - - - 4 YEARS OF COLLEGE   BARRIERS PRIMARY LEARNER - NONE - COGNITIVE   CO-LEARNER CAREGIVER - No - -   PRIMARY LANGUAGE ENGLISH ENGLISH ENGLISH ENGLISH   LEARNER PREFERENCE PRIMARY DEMONSTRATION DEMONSTRATION VIDEOS READING     - - - VIDEOS   ANSWERED BY patient  patient patient patient   RELATIONSHIP SELF SELF SELF SELF       Depression Screening:  :     No flowsheet data found. Fall Risk Assessment:  :     No flowsheet data found. Abuse Screening:  :     Abuse Screening Questionnaire 2015 3/10/2014   Do you ever feel afraid of your partner? N N   Are you in a relationship with someone who physically or mentally threatens you? N N   Is it safe for you to go home? Y Y       Coordination of Care Questionnaire:  :     1) Have you been to an emergency room, urgent care clinic since your last visit? no   Hospitalized since your last visit? no             2) Have you seen or consulted any other health care providers outside of [facility] since your last visit? yes Dr. Pb Summers. (Include any pap smears or colon screenings in this section.)    3) Do you have an Advance Directive on file? no  Are you interested in receiving information about Advance Directives? no    Reviewed record in preparation for visit and have obtained necessary documentation. Medication reconciliation up to date and corrected with patient at this time.

## 2018-06-26 NOTE — PROGRESS NOTES
Subjective:      Huan Hendricks is a 62 y.o. female here today with complaint of frequency, burning with urination, nocturia. Onset was a few days ago, unchanged since that time. Patient denies fever, stomach ache and chills, nausea, vomiting. Patient does have a history of recurrent UTI. Patient does not have a history of pyelonephritis. Evaluation to date: none. Treatment to date: Azo with her last dose at approximately 2 AM today. She is drinking plenty of fluids. Additional concerns:   - Chronic lower back pain secondary to lumbar degenerative disc disease: has had for a number of years now for which she has been in Pain Management. Previous evaluations including XR and MRI imaging of lumbar spine. Previous therapies include PAMELA and medical therapy. She had been under the care of Dr. Josh Muro whom she is no longer able to see due to change in insurance. For the past 6 months, her pain has been managed by Dr. Sukhdev Ashford with the following: Roxicodone 10 mg every 4 hours as needed, gabapentin 800 mg TID, Flexeril, meloxicam 15 mg, Cymbalta 60 mg BID. She reports that Dr. Vaibhav Pierce is no longer able to see her due to change in insurance and referred her back to our office for continued management. Her pain management has been discussed at previous visits and she has been advised that she will need to seek a new Pain Management provider.     - Lymphedema: bilateral lower extremities and has had for a number of years. She reports that she was a participant in a clinical trial at [facility]. She has had initial PT evaluation on 6/6/18 with [facility], PT. Clinic note reviewed via Care Everywhere and patient does bring in printed copy for review. Volumetric measurements: RLE full limb volume (mL): 16413. 78. LLE full limb volume (mL): 23534.  Recommended plan of care includes aquatic exercise at her local [employer] pool for 30 minutes (daily or every other day), general exercise with , fitting for new daytime compression stockings (30/40 mmHg) or compression shorts and knee high compression socks (30/40 mmHg), nighttime compression garments for BLEs (ReidSleeve), and follow up with Lymphedema clinic at [facility] for lymphatic massage. Patient is requesting referral to Lymphedema clinic and for aquatic exercise/ for [facility]. - ADHD, inattentive: has had neuropsychological evaluation with Dr. Steve De La O on 8/21/17. She is currently on Adderall 15 mg twice daily which she reports compliance with. She has previously been under the care of Dr. Vernard Essex, but is unable to see due to insurance. Medication has been managed by Dr. Harjit Summers with last refill on 5/29/18 (#60). She is due for medication at this time. Current Outpatient Prescriptions   Medication Sig Dispense Refill    EPINEPHrine (EPIPEN) 0.3 mg/0.3 mL injection epinephrine 0.3 mg/0.3 mL injection, auto-injector      meloxicam-irritant,cntr irr #2 15 mg kit Take 15 mg by mouth daily.  Naproxen Sodium 500 mg TM24 Take 500 mg by mouth daily.  ondansetron (ZOFRAN ODT) 8 mg disintegrating tablet DISSOLVE 1 TABLET IN MOUTH EVERY 8 HOURS AS NEEDED FOR NAUSEA 20 Tab 0    gabapentin (NEURONTIN) 400 mg capsule TAKE TWO CAPSULES BY MOUTH THREE TIMES DAILY AS NEEDED 180 Cap 3    FLUoxetine (PROZAC) 40 mg capsule Take 40 mg by mouth daily.  DULoxetine (CYMBALTA) 60 mg capsule TAKE ONE CAPSULE BY MOUTH TWICE DAILY 60 Cap 2    ALPRAZolam (XANAX) 1 mg tablet Take 1.5 mg by mouth two (2) times daily as needed.  oxyCODONE IR (ROXICODONE) 20 mg immediate release tablet Take 20 mg by mouth every four (4) hours as needed.  hydrOXYzine HCl (ATARAX) 25 mg tablet Take 1 Tab by mouth three (3) times daily as needed for Itching. 30 Tab 2    dextroamphetamine-amphetamine (ADDERALL) 10 mg tablet Take 15 mg by mouth two (2) times a day.               gabapentin (NEURONTIN) 400 mg capsule TAKE TWO CAPSULES BY MOUTH THREE TIMES DAILY AS NEEDED 180 Cap 2    prasterone, dhea, (INTRAROSA) 6.5 mg inst Insert 1 Insert into vagina nightly. 30 Each 2    amitriptyline (ELAVIL) 50 mg tablet Take 1 Tab by mouth daily. 90 Tab 1    CYCLOBENZAPRINE HCL (FLEXERIL PO) Take 10 mg by mouth Before breakfast, lunch, and dinner.  omeprazole (PRILOSEC) 40 mg capsule Take 1 Cap by mouth daily for 90 days. 90 Cap 1       Allergies   Allergen Reactions    Bee Sting [Sting, Bee] Anaphylaxis    Morphine Other (comments)     Severe stomach cramps     Vicodin [Hydrocodone-Acetaminophen] Hives       Past Medical History:   Diagnosis Date    Anemia     Burn     Shoulder, back and buttocks     CAD (coronary artery disease)     Chronic pain     Followed by Dr. Misti Kelley Dysthymic disorder 11/9/2012    Fibromyalgia     GERD (gastroesophageal reflux disease)     Herniated cervical disc     Kidney stone     Lymphedema     Memory loss, short term     MVP (mitral valve prolapse)     PCOS (polycystic ovarian syndrome)     PTSD (post-traumatic stress disorder)     Pulmonary embolism ([address] X2    Associated with surgery X2    Scoliosis     Stroke [facility])     april 2014    Sun-damaged skin     Sunburn, blistering     Tanning bed exposure     TIA on medication     TIA L hemiparesis - Was on diet pills    Vitamin B 12 deficiency        Social History   Substance Use Topics    Smoking status: Never Smoker    Smokeless tobacco: Never Used      Comment: Never used vapor or e-cigs     Alcohol use No        Review of Systems  Pertinent items are noted in HPI. Objective:     Visit Vitals    /88 (BP 1 Location: Right arm, BP Patient Position: Sitting)  Comment: Manual    Pulse 90    Temp 98 °F (36.7 °C) (Oral)  Comment: .     Resp 18    Ht 5' 5\" (1.651 m)    Wt 217 lb (98.4 kg)    LMP 07/15/2011    SpO2 98%    BMI 36.11 kg/m2      General appearance - alert, well appearing, and in no distress  Neck - supple, no significant adenopathy  Chest - clear to auscultation, no wheezes, rales or rhonchi, symmetric air entry, no tachypnea, retractions or cyanosis  Heart - normal rate, regular rhythm, normal S1, S2, no murmurs, rubs, clicks or gallops  Abdomen - soft, nontender, nondistended, no masses or organomegaly, bowel sounds normal, no bladder distension noted  Extremities - nonpitting edema of BLE, peripheral pulses intact, skin normal       Recent Results (from the past 12 hour(s))   AMB POC URINALYSIS DIP STICK AUTO W/O MICRO    Collection Time: 06/26/18  1:22 PM   Result Value Ref Range    Color (UA POC) Carolynn     Clarity (UA POC) Cloudy     Glucose (UA POC) Trace Negative    Bilirubin (UA POC) 1+ Negative    Ketones (UA POC) Trace Negative    Specific gravity (UA POC) 1.025 1.001 - 1.035    Blood (UA POC) Negative Negative    pH (UA POC) 5 4.6 - 8.0    Protein (UA POC) Negative Negative    Urobilinogen (UA POC) 1 mg/dL 0.2 - 1    Nitrites (UA POC) Positive Negative    Leukocyte esterase (UA POC) 1+ Negative       Assessment/Plan:   Ruth Hanson is a 62 y.o. female seen for:     1. UTI symptoms: history and UA suggestive for infection. Start empiric therapy with Bactrim. Urine has been sent for culture. Push fluids. - CULTURE, URINE  - trimethoprim-sulfamethoxazole (BACTRIM DS, SEPTRA DS) 160-800 mg per tablet; Take 1 Tab by mouth two (2) times a day for 5 days. Dispense: 10 Tab; Refill: 0    2. Urinary frequency  - AMB POC URINALYSIS DIP STICK AUTO W/O MICRO  - CULTURE, URINE    3. Chronic midline low back pain without sciatica: I have again discussed with patient that she will need to find a new Pain Management specialist and we went through a booklet that I have concerning in-network providers and she has been provided with office information. I have provided prescriptions as below.  Will discontinue meloxicam as she finds ibuprofen is more beneficial. Flexeril is non-formulary and this will start tizanidine.   - ibuprofen (MOTRIN) 600 mg tablet; Take 1 Tab by mouth every six (6) hours as needed for Pain. Dispense: 60 Tab; Refill: 2  - tiZANidine (ZANAFLEX) 2 mg tablet; Take 1 Tab by mouth three (3) times daily. Indications: Muscle Spasm  Dispense: 90 Tab; Refill: 2    4. Vertigo: not fully discussed today. Has been on meclizine therapy in the past as she has old prescription bottle. Medication refilled. - meclizine (ANTIVERT) 25 mg tablet; Take 1 Tab by mouth three (3) times daily as needed. Dispense: 60 Tab; Refill: 1    5. Lymphedema of both lower extremities: reviewed documentation from PT in [place]. Will place referral to Lymphedema Clinic. Trial of Bumex therapy for edema (she has developed kidney stones with use of Lasix. - bumetanide (BUMEX) 0.5 mg tablet; Take 1 Tab by mouth daily. Indications: Edema  Dispense: 90 Tab; Refill: 1  - REFERRAL TO LYMPHEDEMA CLINIC  - REFERRAL TO PHYSICAL THERAPY    6. Attention deficit disorder, unspecified hyperactivity presence: have provided her with prescription for one month. She will need to return to further discuss continuation of medical therapy. - dextroamphetamine-amphetamine (ADDERALL) 15 mg tablet; Take 1 Tab (15 mg total) by mouth two (2) times a dayEarliest Fill Date: 6/26/18. Max Daily Amount: 30 mg  Dispense: 60 Tab; Refill: 0    7. Anxiety: have discussed that she will need to establish care with new Psychiatrist. She will be starting outpatient therapy at [address] [place] [address]. Medication refilled for 30 days ago, she will need to return to further discuss. - ALPRAZolam (XANAX) 1 mg tablet; Take 1.5 Tabs by mouth two (2) times daily as needed. Max Daily Amount: 3 mg. Dispense: 90 Tab; Refill: 0    I have discussed the diagnosis with the patient and the intended plan as seen in the above orders. The patient has received an after-visit summary and questions were answered concerning future plans.  I have discussed medication side effects and warnings with the patient as well. Patient verbalizes understanding of plan of care and denies further questions or concerns at this time. Informed patient to return to the office if symptoms worsen or if new symptoms arise.     Follow-up Disposition: Not on File

## 2018-06-28 ENCOUNTER — TELEPHONE (OUTPATIENT)
Dept: OBGYN CLINIC | Age: 58
End: 2018-06-28

## 2018-06-28 RX ORDER — PREDNISONE 20 MG/1
TABLET ORAL
Qty: 10 TAB | Refills: 0 | Status: SHIPPED | OUTPATIENT
Start: 2018-06-28 | End: 2018-07-23 | Stop reason: ALTCHOICE

## 2018-06-28 NOTE — TELEPHONE ENCOUNTER
See telephone encounter. Pt requesting Hipolito Kawasaki. She will need to f/u in the office in 3 months. Rx will be sent to [address] [place] [address] in [place]. Advise patient to expect a phone call from them.

## 2018-06-28 NOTE — TELEPHONE ENCOUNTER
Patient states that the Vanessa Barton is not going to work for her due to her having Medicare. She spoke with her cardiologist, Dr. Cindy Genao and she has been given permission for her to take [address]. He is fine with this medication despite history of heart attack. She said she called her insurance and they also cover Vagifem tabs as long as it has generic estradiol.   She also can be given progesterone if it is written as Medroxypr [facility] [place]    She is aware that if we use [place] that it would be prescribed under [facility] in [place], [place].

## 2018-06-29 NOTE — TELEPHONE ENCOUNTER
Sent in [place] to [place] and confirmation received. Left message for patient on identifiable vm that rx has been sent to [facility] and follow up in 3 months (take rx with food).

## 2018-07-17 ENCOUNTER — TELEPHONE (OUTPATIENT)
Dept: MIDWIFE SERVICES | Age: 58
End: 2018-07-17

## 2018-07-17 NOTE — TELEPHONE ENCOUNTER
This nurse called the patient back . TH patient . 62year old patient last seen in the office on 6/5/18, Patient is calling to say that she has no libido and is not interested in having sex. Patient is taking [place] starting on 7/5/18 Patient is on several antidepressant medications, Cymbalta and Prozac       Patient is wondering how to proceed . Patient is wondering if another medication needs to be added.        Please advise

## 2018-07-18 ENCOUNTER — TELEPHONE (OUTPATIENT)
Dept: FAMILY MEDICINE CLINIC | Age: 58
End: 2018-07-18

## 2018-07-18 DIAGNOSIS — R17 SCLERAL ICTERUS: Primary | ICD-10-CM

## 2018-07-18 NOTE — TELEPHONE ENCOUNTER
Patient advised of work in [place] , Dr. Norman Nayak recommendations and patient stated she would wait a few more weeks and then call the office to make and appointment if her symptoms have not improved. Patient verbalized understanding.

## 2018-07-18 NOTE — TELEPHONE ENCOUNTER
Please inform patient that lab has been placed.      Orders Placed This Encounter    HEPATIC FUNCTION PANEL

## 2018-07-18 NOTE — TELEPHONE ENCOUNTER
Patient called stating that pt's eye dr, Anita Dc, is concerned about patient's eyes being yellow and wanted to know if her PCP would order a blood test to check her liver function.      Best contact:   [phone]

## 2018-07-20 LAB
ALBUMIN SERPL-MCNC: 3.8 G/DL (ref 3.5–5.5)
ALP SERPL-CCNC: 132 IU/L (ref 39–117)
ALT SERPL-CCNC: 33 IU/L (ref 0–32)
AST SERPL-CCNC: 23 IU/L (ref 0–40)
BILIRUB DIRECT SERPL-MCNC: 0.21 MG/DL (ref 0–0.4)
BILIRUB SERPL-MCNC: 0.6 MG/DL (ref 0–1.2)
PROT SERPL-MCNC: 6 G/DL (ref 6–8.5)

## 2018-07-23 ENCOUNTER — OFFICE VISIT (OUTPATIENT)
Dept: FAMILY MEDICINE CLINIC | Age: 58
End: 2018-07-23

## 2018-07-23 ENCOUNTER — TELEPHONE (OUTPATIENT)
Dept: FAMILY MEDICINE CLINIC | Age: 58
End: 2018-07-23

## 2018-07-23 VITALS
HEART RATE: 82 BPM | HEIGHT: 65 IN | WEIGHT: 217 LBS | TEMPERATURE: 98.4 F | RESPIRATION RATE: 16 BRPM | DIASTOLIC BLOOD PRESSURE: 77 MMHG | BODY MASS INDEX: 36.15 KG/M2 | SYSTOLIC BLOOD PRESSURE: 114 MMHG | OXYGEN SATURATION: 97 %

## 2018-07-23 DIAGNOSIS — T30.0 BURN: ICD-10-CM

## 2018-07-23 DIAGNOSIS — R79.89 ABNORMAL LIVER FUNCTION TESTS: Primary | ICD-10-CM

## 2018-07-23 DIAGNOSIS — R35.0 URINARY FREQUENCY: Primary | ICD-10-CM

## 2018-07-23 DIAGNOSIS — L02.32 BOIL OF BUTTOCK: ICD-10-CM

## 2018-07-23 LAB
BILIRUB UR QL STRIP: ABNORMAL
GLUCOSE UR-MCNC: NEGATIVE MG/DL
KETONES P FAST UR STRIP-MCNC: ABNORMAL MG/DL
PH UR STRIP: 5 [PH] (ref 4.6–8)
PROT UR QL STRIP: NEGATIVE
SP GR UR STRIP: 1.02 (ref 1–1.03)
UA UROBILINOGEN AMB POC: ABNORMAL (ref 0.2–1)
URINALYSIS CLARITY POC: CLEAR
URINALYSIS COLOR POC: YELLOW
URINE BLOOD POC: NEGATIVE
URINE LEUKOCYTES POC: NEGATIVE
URINE NITRITES POC: NEGATIVE

## 2018-07-23 RX ORDER — BUSPIRONE HYDROCHLORIDE 10 MG/1
10 TABLET ORAL 2 TIMES DAILY
COMMUNITY
Start: 2018-06-25 | End: 2018-10-22

## 2018-07-23 RX ORDER — ESTROGENS, CONJUGATED 0.62 MG/1
0.62 TABLET, FILM COATED ORAL
COMMUNITY
Start: 2018-06-26 | End: 2019-03-12

## 2018-07-23 RX ORDER — PROMETHAZINE HYDROCHLORIDE 25 MG/1
25 TABLET ORAL
COMMUNITY
Start: 2018-06-25 | End: 2019-12-20 | Stop reason: SDUPTHER

## 2018-07-23 RX ORDER — OMEPRAZOLE 40 MG/1
40 CAPSULE, DELAYED RELEASE ORAL
COMMUNITY
Start: 2018-06-25 | End: 2019-03-18

## 2018-07-23 RX ORDER — SILVER SULFADIAZINE 10 G/1000G
CREAM TOPICAL DAILY
Qty: 50 G | Refills: 0 | Status: SHIPPED | OUTPATIENT
Start: 2018-07-23 | End: 2018-08-17 | Stop reason: SDUPTHER

## 2018-07-23 RX ORDER — SULFAMETHOXAZOLE AND TRIMETHOPRIM 800; 160 MG/1; MG/1
1 TABLET ORAL 2 TIMES DAILY
Qty: 20 TAB | Refills: 0 | Status: SHIPPED | OUTPATIENT
Start: 2018-07-23 | End: 2018-08-02

## 2018-07-23 NOTE — PROGRESS NOTES
Identified pt with two pt identifiers(name and ). Chief Complaint   Patient presents with    Burn     feels it could be infected - on left buttock - started out as a \"rubbed area\" that felt like a pulled muscle so she put htg pad on it and as a result she has a burn that has become larger over the past 3 wks since it first occurred.  Sinus Infection    Nasal Discharge     post nasal drip - yellow in color    Urinary Frequency     started antibx that she had left at the house 2 days ago         Health Maintenance Due   Topic    BREAST CANCER SCRN MAMMOGRAM     COLONOSCOPY     PAP AKA CERVICAL CYTOLOGY        Wt Readings from Last 3 Encounters:   18 217 lb (98.4 kg)   18 217 lb (98.4 kg)   18 221 lb 9.6 oz (100.5 kg)     Temp Readings from Last 3 Encounters:   18 98.4 °F (36.9 °C) (Oral)   18 98 °F (36.7 °C) (Oral)   18 99.3 °F (37.4 °C)     BP Readings from Last 3 Encounters:   18 114/77   18 118/88   18 130/68     Pulse Readings from Last 3 Encounters:   18 82   18 90   18 80         Learning Assessment:  :     Learning Assessment 2016 2015 2015 1/3/2014   PRIMARY LEARNER Patient Patient Patient Patient   HIGHEST LEVEL OF EDUCATION - PRIMARY LEARNER  - - - 4 YEARS OF COLLEGE   BARRIERS PRIMARY LEARNER - NONE - COGNITIVE   CO-LEARNER CAREGIVER - No - -   PRIMARY LANGUAGE ENGLISH ENGLISH ENGLISH ENGLISH   LEARNER PREFERENCE PRIMARY DEMONSTRATION DEMONSTRATION VIDEOS READING     - - - VIDEOS   ANSWERED BY patient  patient patient patient   RELATIONSHIP SELF SELF SELF SELF       Depression Screening:  :     PHQ over the last two weeks 2018   Little interest or pleasure in doing things Nearly every day   Feeling down, depressed, irritable, or hopeless More than half the days   Total Score PHQ 2 5       Fall Risk Assessment:  :     Fall Risk Assessment, last 12 mths 2018   Able to walk?  Yes   Fall in past 15 months? Yes   Fall with injury? Yes   Number of falls in past 12 months 5   Fall Risk Score 6       Abuse Screening:  :     Abuse Screening Questionnaire 7/23/2018 6/30/2015 3/10/2014   Do you ever feel afraid of your partner? N N N   Are you in a relationship with someone who physically or mentally threatens you? N N N   Is it safe for you to go home? Luis Miguel Stallworth             Coordination of Care Questionnaire:  :     1) Have you been to an emergency room, urgent care clinic since your last visit? no   Hospitalized since your last visit? no             2) Have you seen or consulted any other health care providers outside of [address] [place] [address] since your last visit? no      3) Do you have an Advance Directive on file? no  Are you interested in receiving information about Advance Directives? no    Patient is accompanied by self. Reviewed record in preparation for visit and have obtained necessary documentation. Medication reconciliation up to date and corrected with patient at this time.      Order for POC U/A placed per [facility]'s verbal order

## 2018-07-23 NOTE — MR AVS SNAPSHOT
[facility] [address] [facility] 
[phone] Patient: Pal Nur MRN: [id number] Visit Information Date & Time Provider Department Dept. Phone Encounter #  
 7/23/2018 11:00 AM Ethan Guaman [phone] [id number] Follow-up Instructions Return if symptoms worsen or fail to improve. Your Appointments 8/31/2018  2:40 PM  
MAMMOGRAPHY with MAMMOGRAMPARAMJIT ([address]) Appt Note: mammo/ae-flup on [address] 1007 [facility]  
[phone]  
  
   
 [address] [address]  
  
    
 8/31/2018  3:00 PM  
ESTABLISHED PATIENT with Vicki Son MD  
[place] ([address] [facility]) Appt Note: mammo/ae-flup on [address] [facility] 99 [id number]  
[facility] [address] Upcoming Health Maintenance Date Due  
 BREAST CANCER SCRN MAMMOGRAM 12/1/2016 COLONOSCOPY 11/28/2017 PAP AKA CERVICAL CYTOLOGY 11/19/2018 Influenza Age 5 to Adult 8/1/2018 DTaP/Tdap/Td series (2 - Td) 6/30/2025 Allergies as of 7/23/2018  Review Complete On: 7/23/2018 By: Li Ruiz NP Severity Noted Reaction Type Reactions Bee Sting [Sting, Bee]  03/12/2012    Anaphylaxis Morphine  02/04/2016    Other (comments) Severe stomach cramps Vicodin [Hydrocodone-acetaminophen]    Hives Current Immunizations  Reviewed on 5/9/2018 Name Date Influenza Vaccine Roselynn Kevan) 10/21/2016 Influenza Vaccine PF 11/13/2013 Influenza Vaccine Whole 10/1/2012 Tdap 6/30/2015  3:59 PM  
  
 Not reviewed this visit You Were Diagnosed With   
  
 Codes Comments Urinary frequency    -  Primary ICD-10-CM: R35.0 ICD-9-CM: 788.41   
 Boil of buttock     ICD-10-CM: L02.32 
ICD-9-CM: 680.5 Burn     ICD-10-CM: T30.0 ICD-9-CM: 989. 0 Vitals BP Pulse Temp Resp Height(growth percentile) Weight(growth percentile) 114/77 (BP 1 Location: Right arm, BP Patient Position: Sitting) 82 98.4 °F (36.9 °C) (Oral) 16 5' 5\" (1.651 m) 217 lb (98.4 kg) LMP SpO2 BMI OB Status Smoking Status 07/15/2011 97% 36.11 kg/m2 Postmenopausal Never Smoker Vitals History BMI and BSA Data Body Mass Index Body Surface Area  
 36.11 kg/m 2 2.12 m 2 Preferred Pharmacy Pharmacy Name Phone [address] [phone] Your Updated Medication List  
  
   
This list is accurate as of 7/23/18 11:34 AM.  Always use your most recent med list.  
  
  
  
  
 ALPRAZolam 1 mg tablet Commonly known as:  Star Carp Take 1.5 Tabs by mouth two (2) times daily as needed. Max Daily Amount: 3 mg.  
  
 amitriptyline 50 mg tablet Commonly known as:  ELAVIL Take 1 Tab by mouth daily. bumetanide 0.5 mg tablet Commonly known as:  Myla Hearn Take 1 Tab by mouth daily. Indications: Edema  
  
 busPIRone 10 mg tablet Commonly known as:  BUSPAR Take 10 mg by mouth two (2) times a day. dextroamphetamine-amphetamine 15 mg tablet Commonly known as:  ADDERALL Take 1 Tab (15 mg total) by mouth two (2) times a dayEarliest Fill Date: 6/26/18. Max Daily Amount: 30 mg DULoxetine 60 mg capsule Commonly known as:  CYMBALTA TAKE ONE CAPSULE BY MOUTH TWICE DAILY EPINEPHrine 0.3 mg/0.3 mL injection Commonly known as:  EPIPEN  
epinephrine 0.3 mg/0.3 mL injection, auto-injector FLUoxetine 40 mg capsule Commonly known as:  PROzac Take 40 mg by mouth daily. gabapentin 400 mg capsule Commonly known as:  NEURONTIN  
TAKE TWO CAPSULES BY MOUTH THREE TIMES DAILY AS NEEDED  
  
 hydrOXYzine HCl 25 mg tablet Commonly known as:  ATARAX Take 1 Tab by mouth three (3) times daily as needed for Itching. ibuprofen 600 mg tablet Commonly known as:  MOTRIN Take 1 Tab by mouth every six (6) hours as needed for Pain. meclizine 25 mg tablet Commonly known as:  ANTIVERT Take 1 Tab by mouth three (3) times daily as needed. omeprazole 40 mg capsule Commonly known as:  PRILOSEC Take 40 mg by mouth once over twenty-four (24) hours. ondansetron 8 mg disintegrating tablet Commonly known as:  ZOFRAN ODT  
DISSOLVE 1 TABLET IN MOUTH EVERY 8 HOURS AS NEEDED FOR NAUSEA  
  
 ospemifene 60 mg Tab tablet Commonly known as:  [employer] Take 1 Tab by mouth daily. oxyCODONE IR 20 mg immediate release tablet Commonly known as:  Breana Danielito Take 20 mg by mouth every four (4) hours as needed. PREMARIN 0.625 mg tablet Generic drug:  conjugated estrogens Take 0.625 mg by mouth once over twenty-four (24) hours. promethazine 25 mg tablet Commonly known as:  PHENERGAN Take 25 mg by mouth once over twenty-four (24) hours. silver sulfADIAZINE 1 % topical cream  
Commonly known as:  SILVADENE Apply  to affected area daily. tiZANidine 2 mg tablet Commonly known as:  Jeny Fulling Take 1 Tab by mouth three (3) times daily. Indications: Muscle Spasm  
  
 trimethoprim-sulfamethoxazole 160-800 mg per tablet Commonly known as:  BACTRIM DS, SEPTRA DS Take 1 Tab by mouth two (2) times a day for 10 days. Prescriptions Sent to Pharmacy Refills  
 trimethoprim-sulfamethoxazole (BACTRIM DS, SEPTRA DS) 160-800 mg per tablet 0 Sig: Take 1 Tab by mouth two (2) times a day for 10 days. Class: Normal  
 Pharmacy: [facility] HAJA VILLAFUERTE [address] [place] [address] Teresa Ph #: [phone] Route: Oral  
 silver sulfADIAZINE (SILVADENE) 1 % topical cream 0 Sig: Apply  to affected area daily.   
 Class: Normal  
 Pharmacy: [address] [place] [address] Teresa Ph #: [phone] Route: Topical  
  
We Performed the Following AMB POC URINALYSIS DIP STICK AUTO W/O MICRO [40565 CPT(R)] CULTURE, URINE [id number] CPT(R)] Follow-up Instructions Return if symptoms worsen or fail to improve. Patient Instructions Colin: Care Instructions Your Care Instructions Burns-even minor ones-can be very painful. A minor burn may heal within several days, while a more serious burn may take weeks or even months to heal completely. You may notice that the burned area feels tight and hard while it is healing. It is important to continue to move the area as the burn heals to prevent loss of motion or loss of function in the area. When your skin is damaged by a burn, you have a greater risk of infection. Keep the wound clean and change the bandages regularly to prevent infection and help the burn heal. 
Burns can leave permanent scars. Taking good care of the burn as it heals may help prevent bad scars. The doctor has checked you carefully, but problems can develop later. If you notice any problems or new symptoms, get medical treatment right away. Follow-up care is a key part of your treatment and safety. Be sure to make and go to all appointments, and call your doctor if you are having problems. It's also a good idea to know your test results and keep a list of the medicines you take. How can you care for yourself at home? · If your doctor told you how to care for your burn, follow your doctor's instructions. If you did not get instructions, follow this general advice: ¨ Wash the burn with clean water 2 times a day. Don't use hydrogen peroxide or alcohol, which can slow healing. ¨ Gently pat the burn dry after you wash it. ¨ You may cover the burn with a thin layer of petroleum jelly, such as Vaseline, and a nonstick bandage. ¨ Apply more petroleum jelly and replace the bandage as needed. · Protect your burn while it is healing. Cover your burn if you are going out in the cold or the sun. ¨ Wear long sleeves if the burn is on your hands or arms. ¨ Wear a hat if the burn is on your face. ¨ Wear socks and shoes if the burn is on your feet. · Do not break blisters open. This increases the chance of infection. If a blister breaks open by itself, blot up the liquid, and leave the skin that covered the blister. This helps protect the new skin. · If your doctor prescribed antibiotics, take them as directed. Do not stop taking them just because you feel better. You need to take the full course of antibiotics. For pain and itching · Take pain medicines exactly as directed. ¨ If the doctor gave you a prescription medicine for pain, take it as prescribed. ¨ If you are not taking a prescription pain medicine, ask your doctor if you can take an over-the-counter medicine. · If the burn itches, try not to scratch it. Try an over-the-counter antihistamine such as diphenhydramine (Benadryl) or loratadine (Claritin). Read and follow all instructions on the label. When should you call for help? Call your doctor now or seek immediate medical care if: 
  · Your pain gets worse.  
  · You have symptoms of infection, such as: 
¨ Increased pain, swelling, warmth, or redness near the burn. ¨ Red streaks leading from the burn. ¨ Pus draining from the burn. ¨ A fever.  
 Watch closely for changes in your health, and be sure to contact your doctor if: 
  · You do not get better as expected. Where can you learn more? Go to http://jonathon-margareth.info/. Enter H744 in the search box to learn more about \"Burns: Care Instructions. \" Current as of: November 20, 2017 Content Version: 11.7 © 0556-3528 [employer], [email or web address].  Care instructions adapted under license by [employer] (which disclaims liability or warranty for this information). If you have questions about a medical condition or this instruction, always ask your healthcare professional. [address] any warranty or liability for your use of this information. Introducing [facility]! Dear Rad Bae: Thank you for requesting a Socialance account. Our records indicate that you already have an active Socialance account. You can access your account anytime at [email or web address]. "[email or web address] Did you know that you can access your hospital and ER discharge instructions at any time in Socialance? You can also review all of your test results from your hospital stay or ER visit. Additional Information If you have questions, please visit the Frequently Asked Questions section of the Socialance website at [email or web address] Remember, Socialance is NOT to be used for urgent needs. For medical emergencies, dial 911. Now available from your iPhone and Android! Please provide this summary of care documentation to your next provider. Your primary care clinician is listed as [employer]. If you have any questions after today's visit, please call [phone].

## 2018-07-23 NOTE — PROGRESS NOTES
HISTORY OF PRESENT ILLNESS Fredrick Rai is a 62 y.o. female. HPI Pt presents with \"burn, sinus infection and urinary frequency\" Pt states that she believes that she has a burn on her left buttock. She states that she worked all day in the yard about 3 weeks ago, and felt like she had pulled a muscle in her left buttock She used a heating pad, and believes that this burned her skin. Pt states that the area started as a large blister, and over the past 3 weeks has been growing in size. It does not seem to be draining anything, but is red. No fever, but she has had hot and cold chills. She has put on ketoconazole cream, without any benefit. In addition, she believes that she may have a sinus infection. She has had symptoms for about a week Thick nasal congestion Drainage is running down the back of her throat It is hard for her to cough up any mucous No fever Lastly, patient also believes that she may have a UTI. She states that she has been having to urinate all the time. She will only void a little bit of urine, but will still feel like she needs to urinate. No blood noted in urine. Review of Systems Constitutional: Negative for fever. HENT: Positive for congestion and sinus pain. Respiratory: Positive for cough. Gastrointestinal: Negative for diarrhea and vomiting. Genitourinary: Positive for frequency. Negative for hematuria. Skin: Positive for rash. Physical Exam  
Constitutional: She is oriented to person, place, and time. She appears well-developed and well-nourished. HENT:  
Head: Normocephalic and atraumatic. Right Ear: Hearing, tympanic membrane, external ear and ear canal normal.  
Left Ear: Hearing, tympanic membrane, external ear and ear canal normal.  
Nose: Mucosal edema and rhinorrhea present. Mouth/Throat: Posterior oropharyngeal erythema present. Neck: Normal range of motion. Neck supple.   
Cardiovascular: Normal rate, regular rhythm and normal heart sounds. Pulmonary/Chest: Effort normal and breath sounds normal. She has no wheezes. She has no rales. She exhibits no tenderness. Abdominal: There is no CVA tenderness. Lymphadenopathy:  
  She has no cervical adenopathy. Neurological: She is alert and oriented to person, place, and time. Skin: Skin is warm and dry. There is erythema. Psychiatric: She has a normal mood and affect. Her behavior is normal.  
 
 
ASSESSMENT and PLAN 
  ICD-10-CM ICD-9-CM 1. Urinary frequency R35.0 788.41 AMB POC URINALYSIS DIP STICK AUTO W/O MICRO  
   CULTURE, URINE 2. Boil of buttock L02.32 680.5 trimethoprim-sulfamethoxazole (BACTRIM DS, SEPTRA DS) 160-800 mg per tablet 3. Burn T30.0 949.0 silver sulfADIAZINE (SILVADENE) 1 % topical cream  
 
Informed patient that she should take medications as prescribed. Educated about keeping area clean and dry. Should return to office with continued and/or worsening of symptoms. Will notify her when urine culture returns, and inform her of any change in plan of care at that time. Pt informed to return to office with worsening of symptoms, or PRN with any questions or concerns. Pt verbalizes understanding of plan of care and denies further questions or concerns at this time.

## 2018-07-23 NOTE — PATIENT INSTRUCTIONS
Colin: Care Instructions Your Care Instructions Burns-even minor ones-can be very painful. A minor burn may heal within several days, while a more serious burn may take weeks or even months to heal completely. You may notice that the burned area feels tight and hard while it is healing. It is important to continue to move the area as the burn heals to prevent loss of motion or loss of function in the area. When your skin is damaged by a burn, you have a greater risk of infection. Keep the wound clean and change the bandages regularly to prevent infection and help the burn heal. 
Burns can leave permanent scars. Taking good care of the burn as it heals may help prevent bad scars. The doctor has checked you carefully, but problems can develop later. If you notice any problems or new symptoms, get medical treatment right away. Follow-up care is a key part of your treatment and safety. Be sure to make and go to all appointments, and call your doctor if you are having problems. It's also a good idea to know your test results and keep a list of the medicines you take. How can you care for yourself at home? · If your doctor told you how to care for your burn, follow your doctor's instructions. If you did not get instructions, follow this general advice: ¨ Wash the burn with clean water 2 times a day. Don't use hydrogen peroxide or alcohol, which can slow healing. ¨ Gently pat the burn dry after you wash it. ¨ You may cover the burn with a thin layer of petroleum jelly, such as Vaseline, and a nonstick bandage. ¨ Apply more petroleum jelly and replace the bandage as needed. · Protect your burn while it is healing. Cover your burn if you are going out in the cold or the sun. ¨ Wear long sleeves if the burn is on your hands or arms. ¨ Wear a hat if the burn is on your face. ¨ Wear socks and shoes if the burn is on your feet. · Do not break blisters open. This increases the chance of infection.  If a blister breaks open by itself, blot up the liquid, and leave the skin that covered the blister. This helps protect the new skin. · If your doctor prescribed antibiotics, take them as directed. Do not stop taking them just because you feel better. You need to take the full course of antibiotics. For pain and itching · Take pain medicines exactly as directed. ¨ If the doctor gave you a prescription medicine for pain, take it as prescribed. ¨ If you are not taking a prescription pain medicine, ask your doctor if you can take an over-the-counter medicine. · If the burn itches, try not to scratch it. Try an over-the-counter antihistamine such as diphenhydramine (Benadryl) or loratadine (Claritin). Read and follow all instructions on the label. When should you call for help? Call your doctor now or seek immediate medical care if: 
  · Your pain gets worse.  
  · You have symptoms of infection, such as: 
¨ Increased pain, swelling, warmth, or redness near the burn. ¨ Red streaks leading from the burn. ¨ Pus draining from the burn. ¨ A fever.  
 Watch closely for changes in your health, and be sure to contact your doctor if: 
  · You do not get better as expected. Where can you learn more? Go to http://jonathon-margareth.info/. Enter P544 in the search box to learn more about \"Burns: Care Instructions. \" Current as of: November 20, 2017 Content Version: 11.7 © 8950-0934 [employer]. Care instructions adapted under license by [employer] (which disclaims liability or warranty for this information). If you have questions about a medical condition or this instruction, always ask your healthcare professional. [place] [address] any warranty or liability for your use of this information.

## 2018-07-23 NOTE — TELEPHONE ENCOUNTER
Spoke with patient concerning her most recent lab results. Labs notable for elevated alk phosphatase and ALT. Will further evaluate with abdominal US and order has been placed. Patient verbalizes understanding.

## 2018-07-27 ENCOUNTER — HOSPITAL ENCOUNTER (OUTPATIENT)
Dept: ULTRASOUND IMAGING | Age: 58
Discharge: HOME | End: 2018-07-27
Attending: FAMILY MEDICINE
Payer: MEDICARE

## 2018-07-27 ENCOUNTER — TELEPHONE (OUTPATIENT)
Dept: FAMILY MEDICINE CLINIC | Age: 58
End: 2018-07-27

## 2018-07-27 DIAGNOSIS — R79.89 ABNORMAL LIVER FUNCTION TESTS: ICD-10-CM

## 2018-07-27 LAB
BACTERIA UR CULT: NORMAL
BACTERIA UR CULT: NORMAL

## 2018-07-27 PROCEDURE — 76700 US EXAM ABDOM COMPLETE: CPT

## 2018-07-27 NOTE — TELEPHONE ENCOUNTER
----- Message from Hosea Garay NP sent at 7/27/2018  7:45 AM EDT -----  Please call patient and let her know that her urine culture returned and is negative, no UTI. Thanks!

## 2018-07-27 NOTE — PROGRESS NOTES
Please call patient and let her know that her urine culture returned and is negative, no UTI. Thanks!

## 2018-07-30 ENCOUNTER — TELEPHONE (OUTPATIENT)
Dept: FAMILY MEDICINE CLINIC | Age: 58
End: 2018-07-30

## 2018-07-30 DIAGNOSIS — J01.00 ACUTE NON-RECURRENT MAXILLARY SINUSITIS: Primary | ICD-10-CM

## 2018-07-30 RX ORDER — CEPHALEXIN 500 MG/1
500 CAPSULE ORAL 2 TIMES DAILY
Qty: 20 CAP | Refills: 0 | Status: SHIPPED | OUTPATIENT
Start: 2018-07-30 | End: 2018-08-09

## 2018-07-30 NOTE — TELEPHONE ENCOUNTER
Moo Hargrove tried to call her last week to advise, negative urine culture but could only leave a VM requesting she callback. Please review her abdominal ultrasound.

## 2018-07-30 NOTE — TELEPHONE ENCOUNTER
Patient called and stated that the bactrim appears to be making her sick at her stomach and the sinus infection is getting worse rather than better. She is asking if something different call be called into [facility] here in [place].   Any questions patient can be reached at [phone]

## 2018-07-30 NOTE — TELEPHONE ENCOUNTER
I have sent in Keflex. Should take as prescribed. If she has further concerns or complaints would need an appointment.   Thanks

## 2018-08-01 ENCOUNTER — TELEPHONE (OUTPATIENT)
Dept: FAMILY MEDICINE CLINIC | Age: 58
End: 2018-08-01

## 2018-08-01 DIAGNOSIS — R93.89 ABNORMAL FINDING ON ULTRASOUND: Primary | ICD-10-CM

## 2018-08-06 ENCOUNTER — TELEPHONE (OUTPATIENT)
Dept: FAMILY MEDICINE CLINIC | Age: 58
End: 2018-08-06

## 2018-08-06 NOTE — TELEPHONE ENCOUNTER
Pt has an appt 8/10/18 for CT [facility] stated to pt to call office and ask dr if she needs to drink the contrast or just IV please call and advise

## 2018-08-08 RX ORDER — ONDANSETRON 8 MG/1
TABLET, ORALLY DISINTEGRATING ORAL
Qty: 20 TAB | Refills: 0 | Status: SHIPPED | OUTPATIENT
Start: 2018-08-08 | End: 2018-09-18 | Stop reason: SDUPTHER

## 2018-08-10 ENCOUNTER — HOSPITAL ENCOUNTER (OUTPATIENT)
Dept: CT IMAGING | Age: 58
Discharge: HOME | End: 2018-08-10
Attending: FAMILY MEDICINE
Payer: MEDICARE

## 2018-08-10 DIAGNOSIS — R93.89 ABNORMAL FINDING ON ULTRASOUND: ICD-10-CM

## 2018-08-10 PROCEDURE — 74011636320 HC RX REV CODE- 636/320: Performed by: FAMILY MEDICINE

## 2018-08-10 PROCEDURE — 74177 CT ABD & PELVIS W/CONTRAST: CPT

## 2018-08-10 RX ADMIN — IOPAMIDOL 100 ML: 755 INJECTION, SOLUTION INTRAVENOUS at 13:15

## 2018-08-11 DIAGNOSIS — T30.0 BURN: ICD-10-CM

## 2018-08-13 ENCOUNTER — TELEPHONE (OUTPATIENT)
Dept: FAMILY MEDICINE CLINIC | Age: 58
End: 2018-08-13

## 2018-08-13 RX ORDER — SILVER SULFADIAZINE 10 G/1000G
CREAM TOPICAL
Refills: 0 | OUTPATIENT
Start: 2018-08-13

## 2018-08-13 NOTE — TELEPHONE ENCOUNTER
Pt reports she has already been seen for area of concern and it is healing, however, is not completely healed. She was advised she would need to be seen for reevaluation to determine if more cream is warranted. Pt verbalized understanding.

## 2018-08-17 ENCOUNTER — OFFICE VISIT (OUTPATIENT)
Dept: FAMILY MEDICINE CLINIC | Age: 58
End: 2018-08-17

## 2018-08-17 VITALS
WEIGHT: 220 LBS | BODY MASS INDEX: 36.65 KG/M2 | OXYGEN SATURATION: 99 % | HEIGHT: 65 IN | SYSTOLIC BLOOD PRESSURE: 130 MMHG | TEMPERATURE: 98.5 F | HEART RATE: 55 BPM | RESPIRATION RATE: 18 BRPM | DIASTOLIC BLOOD PRESSURE: 88 MMHG

## 2018-08-17 DIAGNOSIS — M54.50 CHRONIC MIDLINE LOW BACK PAIN WITHOUT SCIATICA: ICD-10-CM

## 2018-08-17 DIAGNOSIS — T63.441A ACCIDENTAL BEE STING: ICD-10-CM

## 2018-08-17 DIAGNOSIS — F41.9 ANXIETY: ICD-10-CM

## 2018-08-17 DIAGNOSIS — G89.29 CHRONIC MIDLINE LOW BACK PAIN WITHOUT SCIATICA: ICD-10-CM

## 2018-08-17 DIAGNOSIS — L30.4 INTERTRIGO: Primary | ICD-10-CM

## 2018-08-17 DIAGNOSIS — T21.05XD: ICD-10-CM

## 2018-08-17 RX ORDER — NYSTATIN 100000 [USP'U]/G
POWDER TOPICAL 3 TIMES DAILY
Qty: 60 G | Refills: 2 | Status: SHIPPED | OUTPATIENT
Start: 2018-08-17 | End: 2018-10-22

## 2018-08-17 RX ORDER — ALPRAZOLAM 1 MG/1
1.5 TABLET ORAL
Qty: 90 TAB | Refills: 1 | Status: SHIPPED | OUTPATIENT
Start: 2018-08-17 | End: 2019-01-25 | Stop reason: SDUPTHER

## 2018-08-17 RX ORDER — DEXTROAMPHETAMINE SACCHARATE, AMPHETAMINE ASPARTATE, DEXTROAMPHETAMINE SULFATE AND AMPHETAMINE SULFATE 3.75; 3.75; 3.75; 3.75 MG/1; MG/1; MG/1; MG/1
45 TABLET ORAL DAILY
COMMUNITY
Start: 2018-07-30 | End: 2018-08-21 | Stop reason: SDUPTHER

## 2018-08-17 RX ORDER — PREDNISONE 20 MG/1
TABLET ORAL
Qty: 10 TAB | Refills: 0 | Status: SHIPPED | OUTPATIENT
Start: 2018-08-17 | End: 2018-09-24

## 2018-08-17 RX ORDER — TIZANIDINE 2 MG/1
2 TABLET ORAL 3 TIMES DAILY
Qty: 90 TAB | Refills: 2 | Status: SHIPPED | OUTPATIENT
Start: 2018-08-17 | End: 2019-01-16 | Stop reason: ALTCHOICE

## 2018-08-17 RX ORDER — SILVER SULFADIAZINE 10 G/1000G
CREAM TOPICAL DAILY
Qty: 50 G | Refills: 0 | Status: SHIPPED | OUTPATIENT
Start: 2018-08-17 | End: 2018-10-22

## 2018-08-17 RX ORDER — NYSTATIN 100000 U/G
CREAM TOPICAL
Qty: 30 G | Refills: 2 | Status: SHIPPED | OUTPATIENT
Start: 2018-08-17 | End: 2018-10-22

## 2018-08-17 NOTE — PROGRESS NOTES
Identified pt with two pt identifiers(name and ). Chief Complaint   Patient presents with    Medication Refill    Referral Follow Up     would like to see pain managament.  Vaginal Itching     would like miconazole powder for rash in genital area        Health Maintenance Due   Topic    BREAST CANCER SCRN MAMMOGRAM     COLONOSCOPY     Influenza Age 5 to Adult     MEDICARE YEARLY EXAM     PAP AKA CERVICAL CYTOLOGY        Wt Readings from Last 3 Encounters:   18 220 lb (99.8 kg)   18 217 lb (98.4 kg)   18 217 lb (98.4 kg)     Temp Readings from Last 3 Encounters:   18 98.5 °F (36.9 °C) (Oral)   18 98.4 °F (36.9 °C) (Oral)   18 98 °F (36.7 °C) (Oral)     BP Readings from Last 3 Encounters:   18 130/88   18 114/77   18 118/88     Pulse Readings from Last 3 Encounters:   18 (!) 55   18 82   18 90         Learning Assessment:  :     Learning Assessment 2016 2015 2015 1/3/2014   PRIMARY LEARNER Patient Patient Patient Patient   HIGHEST LEVEL OF EDUCATION - PRIMARY LEARNER  - - - [address] [facility] [place] CAREGIVER - No - -   PRIMARY LANGUAGE ENGLISH ENGLISH ENGLISH ENGLISH   LEARNER PREFERENCE PRIMARY DEMONSTRATION DEMONSTRATION VIDEOS READING     - - - VIDEOS   ANSWERED BY patient  patient patient patient   RELATIONSHIP SELF SELF SELF SELF       Depression Screening:  :     PHQ over the last two weeks 2018   Little interest or pleasure in doing things Nearly every day   Feeling down, depressed, irritable, or hopeless More than half the days   Total Score PHQ 2 5       Fall Risk Assessment:  :     Fall Risk Assessment, last 12 mths 2018   Able to walk? Yes   Fall in past 12 months? Yes   Fall with injury?  Yes   Number of falls in past 12 months 5   Fall Risk Score 6       Abuse Screening:  :     Abuse Screening Questionnaire 2018 2015 3/10/2014   Do you ever feel afraid of your partner? N N N   Are you in a relationship with someone who physically or mentally threatens you? N N N   Is it safe for you to go home? Y Y Y       Coordination of Care Questionnaire:  :     1) Have you been to an emergency room, urgent care clinic since your last visit? no   Hospitalized since your last visit? no             2) Have you seen or consulted any other health care providers outside of [facility] since your last visit? no  (Include any pap smears or colon screenings in this section.)    3) Do you have an Advance Directive on file? no  Are you interested in receiving information about Advance Directives? no    Reviewed record in preparation for visit and have obtained necessary documentation. Medication reconciliation up to date and corrected with patient at this time.

## 2018-08-17 NOTE — MR AVS SNAPSHOT
[address] [facility] 
 
 
 [address] [facility] 
[phone] Patient: Keren Bryant MRN: [id number] Visit Information Date & Time Provider Department Dept. Phone Encounter #  
 8/17/2018  9:45 AM Edgar LucioEthan [phone] [id number] Follow-up Instructions Return if symptoms worsen or fail to improve. Your Appointments 8/31/2018  2:40 PM  
MAMMOGRAPHY with MAMMOGRAM, PARAMJIT Coello ([facility]) Appt Note: mammo/ae-flup on intrarosa [address] [place], [place] [address] [place] [address]  
[phone]  
  
   
 [address]  
  
    
 8/31/2018  3:00 PM  
ESTABLISHED PATIENT with MD Dylan Contreras ([facility]) Appt Note: mammo/ae-flup on intrarosa [address] [place] [address] Road [address] [facility] [id number]  
[facility] Upcoming Health Maintenance Date Due  
 BREAST CANCER SCRN MAMMOGRAM 12/1/2016 COLONOSCOPY 11/28/2017 Influenza Age 5 to Adult 8/1/2018 MEDICARE YEARLY EXAM 8/13/2018 PAP AKA CERVICAL CYTOLOGY 11/19/2018 DTaP/Tdap/Td series (2 - Td) 6/30/2025 Allergies as of 8/17/2018  Review Complete On: 8/17/2018 By: Edgar Valdes MD  
  
 Severity Noted Reaction Type Reactions Bee Sting [Sting, Bee]  03/12/2012    Anaphylaxis Morphine  02/04/2016    Other (comments) Severe stomach cramps Vicodin [Hydrocodone-acetaminophen]    Hives Current Immunizations  Reviewed on 8/17/2018 Name Date Influenza Vaccine (Quad) 8/1/2017 12:00 AM, 10/21/2016 Influenza Vaccine PF 11/13/2013 Influenza Vaccine Whole 10/1/2012  Tdap 8/1/2017 12:00 AM, 6/30/2015  3:59 PM  
  
 Reviewed by Edgar Valdes MD on 8/17/2018 at 11:09 AM  
You Were Diagnosed With   
  
 Codes Comments Intertrigo    -  Primary ICD-10-CM: L30.4 ICD-9-CM: 695.89 Burn of buttock, unspecified burn degree, subsequent encounter     ICD-10-CM: T21.05XD ICD-9-CM: V58.89, 942.04 Anxiety     ICD-10-CM: F41.9 ICD-9-CM: 300.00 Chronic midline low back pain without sciatica     ICD-10-CM: M54.5, G89.29 ICD-9-CM: 724.2, 338.29 Accidental bee sting     ICD-10-CM: D66.987V ICD-9-CM: 989.5, E905.3 Vitals BP Pulse Temp Resp Height(growth percentile) Weight(growth percentile) 130/88 (BP 1 Location: Right arm, BP Patient Position: Sitting) (!) 55 98.5 °F (36.9 °C) (Oral) 18 5' 5\" (1.651 m) 220 lb (99.8 kg) LMP SpO2 BMI OB Status Smoking Status 07/15/2011 99% 36.61 kg/m2 Postmenopausal Never Smoker Vitals History BMI and BSA Data Body Mass Index Body Surface Area  
 36.61 kg/m 2 2.14 m 2 Preferred Pharmacy Pharmacy Name Phone [address] [phone] Your Updated Medication List  
  
   
This list is accurate as of 8/17/18 11:14 AM.  Always use your most recent med list.  
  
  
  
  
 ALPRAZolam 1 mg tablet Commonly known as:  Natarajan Leaks Take 1.5 Tabs by mouth two (2) times daily as needed. Max Daily Amount: 3 mg. Indications: anxiety  
  
 amitriptyline 50 mg tablet Commonly known as:  ELAVIL Take 1 Tab by mouth daily. bumetanide 0.5 mg tablet Commonly known as:  Malachy Bile Take 1 Tab by mouth daily. Indications: Edema  
  
 busPIRone 10 mg tablet Commonly known as:  BUSPAR Take 10 mg by mouth two (2) times a day. dextroamphetamine-amphetamine 15 mg tablet Commonly known as:  ADDERALL Take 45 mg by mouth daily. 2 tabs in the Am and 1 in the pm.  
  
 DULoxetine 60 mg capsule Commonly known as:  CYMBALTA TAKE ONE CAPSULE BY MOUTH TWICE DAILY EPINEPHrine 0.3 mg/0.3 mL injection Commonly known as:  EPIPEN  
epinephrine 0.3 mg/0.3 mL injection, auto-injector FLUoxetine 40 mg capsule Commonly known as:  PROzac Take 40 mg by mouth daily. gabapentin 400 mg capsule Commonly known as:  NEURONTIN  
TAKE TWO CAPSULES BY MOUTH THREE TIMES DAILY AS NEEDED  
  
 hydrOXYzine HCl 25 mg tablet Commonly known as:  ATARAX Take 1 Tab by mouth three (3) times daily as needed for Itching. ibuprofen 600 mg tablet Commonly known as:  MOTRIN Take 1 Tab by mouth every six (6) hours as needed for Pain. meclizine 25 mg tablet Commonly known as:  ANTIVERT Take 1 Tab by mouth three (3) times daily as needed. * nystatin topical cream  
Commonly known as:  MYCOSTATIN Apply  to affected area two (2) times daily as needed for Skin Irritation or Itching. Apply underneath the abdomen and to the inner thighs. * nystatin powder Commonly known as:  MYCOSTATIN Apply  to affected area three (3) times daily. Apply underneath the abdomen. omeprazole 40 mg capsule Commonly known as:  PRILOSEC Take 40 mg by mouth once over twenty-four (24) hours. ondansetron 8 mg disintegrating tablet Commonly known as:  ZOFRAN ODT  
DISSOLVE 1 TABLET IN MOUTH EVERY 8 HOURS AS NEEDED FOR NAUSEA  
  
 ospemifene 60 mg Tab tablet Commonly known as:  [employer] Take 1 Tab by mouth daily. oxyCODONE IR 20 mg immediate release tablet Commonly known as:  Bart Rattler Take 20 mg by mouth every four (4) hours as needed. predniSONE 20 mg tablet Commonly known as:  DELTASONE  
TAKE 1 TABLET BY MOUTH TWICE DAILY FOR 5 DAYS PREMARIN 0.625 mg tablet Generic drug:  conjugated estrogens Take 0.625 mg by mouth once over twenty-four (24) hours. promethazine 25 mg tablet Commonly known as:  PHENERGAN Take 25 mg by mouth once over twenty-four (24) hours. silver sulfADIAZINE 1 % topical cream  
Commonly known as:  SILVADENE Apply  to affected area daily. tiZANidine 2 mg tablet Commonly known as:  Gumaro Schilling Take 1 Tab by mouth three (3) times daily. Indications: Muscle Spasm * Notice: This list has 2 medication(s) that are the same as other medications prescribed for you. Read the directions carefully, and ask your doctor or other care provider to review them with you. Prescriptions Printed Refills ALPRAZolam (XANAX) 1 mg tablet 1 Sig: Take 1.5 Tabs by mouth two (2) times daily as needed. Max Daily Amount: 3 mg. Indications: anxiety Class: Print Route: Oral  
  
Prescriptions Sent to Pharmacy Refills  
 silver sulfADIAZINE (SILVADENE) 1 % topical cream 0 Sig: Apply  to affected area daily. Class: Normal  
 Pharmacy: [facility] DR HAJA ALLEN [address] Ph #: [phone] Route: Topical  
 tiZANidine (ZANAFLEX) 2 mg tablet 2 Sig: Take 1 Tab by mouth three (3) times daily. Indications: Muscle Spasm Class: Normal  
 Pharmacy: [facility] DR HAJA ALLEN [address] Ph #: [phone] Route: Oral  
 nystatin (MYCOSTATIN) topical cream 2 Sig: Apply  to affected area two (2) times daily as needed for Skin Irritation or Itching. Apply underneath the abdomen and to the inner thighs. Class: Normal  
 Pharmacy: [facility] DR HAJA ALLEN [address] Ph #: [phone] Route: Topical  
 nystatin (MYCOSTATIN) powder 2 Sig: Apply  to affected area three (3) times daily. Apply underneath the abdomen. Class: Normal  
 Pharmacy: [facility] DR HAJA ALLEN [address] Ph #: [phone] Route: Topical  
 predniSONE (DELTASONE) 20 mg tablet 0 Sig: TAKE 1 TABLET BY MOUTH TWICE DAILY FOR 5 DAYS Class: Normal  
 Pharmacy: [facility] DR HAJA ALLEN [address] Ph #: [phone] We Performed the Following REFERRAL TO WOUND CARE [MPH784 Custom] Comments:  
 Patient with burn to the right buttock x 3 months which has not healed. Please evaluate. Follow-up Instructions Return if symptoms worsen or fail to improve. Referral Information Referral ID Referred By Referred To  
  
 [id number] Destinee Frazier Not Available Visits Status Start Date End Date 1 New Request 8/17/18 8/17/19 If your referral has a status of pending review or denied, additional information will be sent to support the outcome of this decision. Patient Instructions Yeast Skin Infection: Care Instructions Your Care Instructions Yeast normally lives on your skin. Sometimes too much yeast can overgrow in certain areas of the skin and cause an infection. The infection causes red, scaly, moist patches on your skin that may itch. Common areas for skin yeast infections are skin folds under the breasts or belly area. The warm and moist areas in the skin folds can make it easier for yeast to overgrow. Yeast infections also can be found on other parts of the body such as the groin or armpits. You will probably get a cream or ointment that contains an antifungal medicine. Examples of these are miconazole and clotrimazole. You put it on your skin to treat the infection. Your doctor may give you a prescription for the cream or ointment. Or you may be able to buy it without a prescription at most drugstores. If the infection is severe, the doctor will prescribe antifungal pills. A yeast infection usually goes away after about a week of treatment. But it's important to use the medicine for as long as your doctor tells you to. Follow-up care is a key part of your treatment and safety. Be sure to make and go to all appointments, and call your doctor if you are having problems. It's also a good idea to know your test results and keep a list of the medicines you take. How can you care for yourself at home? · Be safe with medicines. Take your medicines exactly as prescribed. Call your doctor if you think you are having a problem with your medicine. · Keep your skin clean and dry. Your doctor may suggest using powder that contains an antifungal medicine in the skin folds. · Wear loose clothing. When should you call for help? Call your doctor now or seek immediate medical care if: 
  · You have symptoms of infection, such as: 
¨ Increased pain, swelling, warmth, or redness. ¨ Red streaks leading from the area. ¨ Pus draining from the area. ¨ A fever.  
 Watch closely for changes in your health, and be sure to contact your doctor if: 
  · You do not get better as expected. Where can you learn more? Go to http://jonathon-margareth.info/. Enter G232 in the search box to learn more about \"Yeast Skin Infection: Care Instructions. \" Current as of: October 5, 2017 Content Version: 11.7 © 2298-7915 [employer]. Care instructions adapted under license by [employer] (which disclaims liability or warranty for this information). If you have questions about a medical condition or this instruction, always ask your healthcare professional. [place] [address] any warranty or liability for your use of this information. Introducing [facility]! Dear Jozef Moon: Thank you for requesting a [employer] account. Our records indicate that you already have an active [employer] account. You can access your account anytime at [email or web address] Did you know that you can access your hospital and ER discharge instructions at any time in [employer]? You can also review all of your test results from your hospital stay or ER visit. Additional Information If you have questions, please visit the Frequently Asked Questions section of the [employer] website at [email or web address] Remember, [employer] is NOT to be used for urgent needs. For medical emergencies, dial 911. Now available from your iPhone and Android! Please provide this summary of care documentation to your next provider. Your primary care clinician is listed as Soledad Bustamante. If you have any questions after today's visit, please call [phone].

## 2018-08-17 NOTE — PROGRESS NOTES
Subjective:      Pal Nur is a 62 y.o. female here with rash, revaluation of burn on buttock, and medication refill. Burn on the buttock which occurred about 3 months ago. She has been applying silver sulfadiazine to the area twice daily. Has improved from prior, but has not completely resolved. She reports red itchy rash between the legs and underneath the abdomen. Reports occurs every summer. She has been OTC medicated power without effectiveness. No change in soaps, lotions, or detergents used. She will need referral to Pain Management. - Her previous Pain Management was provided by Dr. Antonia Shah and Dr. Felicia Scott. - Has been in contact with her insurer. She reports that she in on waiting list for one office (does not recall name). - Have discussed previously that I will not prescribe her opioid therapy. - Requests refill of Zanaflex    Current Outpatient Prescriptions   Medication Sig Dispense Refill    dextroamphetamine-amphetamine (ADDERALL) 15 mg tablet Take 45 mg by mouth daily. 2 tabs in the Am and 1 in the pm.      ondansetron (ZOFRAN ODT) 8 mg disintegrating tablet DISSOLVE 1 TABLET IN MOUTH EVERY 8 HOURS AS NEEDED FOR NAUSEA 20 Tab 0    promethazine (PHENERGAN) 25 mg tablet Take 25 mg by mouth once over twenty-four (24) hours.  busPIRone (BUSPAR) 10 mg tablet Take 10 mg by mouth two (2) times a day.  omeprazole (PRILOSEC) 40 mg capsule Take 40 mg by mouth once over twenty-four (24) hours.  PREMARIN 0.625 mg tablet Take 0.625 mg by mouth once over twenty-four (24) hours.  silver sulfADIAZINE (SILVADENE) 1 % topical cream Apply  to affected area daily. 50 g 0    ospemifene (OSPHENA) 60 mg tab tablet Take 1 Tab by mouth daily. 90 Tab 0    EPINEPHrine (EPIPEN) 0.3 mg/0.3 mL injection epinephrine 0.3 mg/0.3 mL injection, auto-injector      bumetanide (BUMEX) 0.5 mg tablet Take 1 Tab by mouth daily.  Indications: Edema 90 Tab 1    meclizine (ANTIVERT) 25 mg tablet Take 1 Tab by mouth three (3) times daily as needed. 60 Tab 1    ibuprofen (MOTRIN) 600 mg tablet Take 1 Tab by mouth every six (6) hours as needed for Pain. 60 Tab 2    tiZANidine (ZANAFLEX) 2 mg tablet Take 1 Tab by mouth three (3) times daily. Indications: Muscle Spasm 90 Tab 2    ALPRAZolam (XANAX) 1 mg tablet Take 1.5 Tabs by mouth two (2) times daily as needed. Max Daily Amount: 3 mg. 90 Tab 0    amitriptyline (ELAVIL) 50 mg tablet Take 1 Tab by mouth daily. 90 Tab 1    gabapentin (NEURONTIN) 400 mg capsule TAKE TWO CAPSULES BY MOUTH THREE TIMES DAILY AS NEEDED 180 Cap 3    FLUoxetine (PROZAC) 40 mg capsule Take 40 mg by mouth daily.  DULoxetine (CYMBALTA) 60 mg capsule TAKE ONE CAPSULE BY MOUTH TWICE DAILY 60 Cap 2    oxyCODONE IR (ROXICODONE) 20 mg immediate release tablet Take 20 mg by mouth every four (4) hours as needed.  hydrOXYzine HCl (ATARAX) 25 mg tablet Take 1 Tab by mouth three (3) times daily as needed for Itching.  30 Tab 2       Allergies   Allergen Reactions    Bee Sting [Sting, Bee] Anaphylaxis    Morphine Other (comments)     Severe stomach cramps     Vicodin [Hydrocodone-Acetaminophen] Hives       Past Medical History:   Diagnosis Date    Anemia     Burn     Shoulder, back and buttocks     CAD (coronary artery disease)     Chronic pain     Followed by Dr. Yolanda Lockett Dysthymic disorder 11/9/2012    Fibromyalgia     GERD (gastroesophageal reflux disease)     Herniated cervical disc     Kidney stone     Lymphedema     Memory loss, short term     MVP (mitral valve prolapse)     PCOS (polycystic ovarian syndrome)     PTSD (post-traumatic stress disorder)     Pulmonary embolism ([address] X2    Associated with surgery X2    Scoliosis     Stroke [facility])     april 2014    Sun-damaged skin     Sunburn, blistering     Tanning bed exposure     TIA on medication     TIA L hemiparesis - Was on diet pills    Vitamin B 12 deficiency        Social History Substance Use Topics    Smoking status: Never Smoker    Smokeless tobacco: Never Used      Comment: Never used vapor or e-cigs     Alcohol use No        Review of Systems  Pertinent items are noted in HPI. Objective:     Visit Vitals    /88 (BP 1 Location: Right arm, BP Patient Position: Sitting)  Comment: Manual    Pulse (!) 55    Temp 98.5 °F (36.9 °C) (Oral)  Comment: .  Resp 18    Ht 5' 5\" (1.651 m)    Wt 220 lb (99.8 kg)    LMP 07/15/2011    SpO2 99%    BMI 36.61 kg/m2      General appearance - alert, well appearing, and in no distress  Chest - clear to auscultation, no wheezes, rales or rhonchi, symmetric air entry, no tachypnea, retractions or cyanosis  Heart - normal rate, regular rhythm, normal S1, S2, no murmurs, rubs, clicks or gallops  Abdomen - soft, nontender, nondistended, no masses or organomegaly, bowel sounds normal   Skin - erythematous rash underneath the abdominal pannus and inner thighs, left buttock with healing burn without drainage or surround erythema     Assessment/Plan:   Fredrick Rai is a 62 y.o. female seen for:     1. Intertrigo  - nystatin (MYCOSTATIN) topical cream; Apply  to affected area two (2) times daily as needed for Skin Irritation or Itching. Apply underneath the abdomen and to the inner thighs. Dispense: 30 g; Refill: 2  - nystatin (MYCOSTATIN) powder; Apply  to affected area three (3) times daily. Apply underneath the abdomen. Dispense: 60 g; Refill: 2  - keep area clean and dry     2. Burn of buttock, unspecified burn degree, subsequent encounter: has taken sometime to completely heal. Will refer to Wound Care for further evaluation. - silver sulfADIAZINE (SILVADENE) 1 % topical cream; Apply  to affected area daily. Dispense: 50 g; Refill: 0  - REFERRAL TO WOUND CARE    3. Anxiety: stable, continue with current therapy. - ALPRAZolam (XANAX) 1 mg tablet; Take 1.5 Tabs by mouth two (2) times daily as needed. Max Daily Amount: 3 mg. Indications: anxiety  Dispense: 90 Tab; Refill: 1    4. Chronic midline low back pain without sciatica: continue with Zanaflex. Looking to establish care with Pain Management. - tiZANidine (ZANAFLEX) 2 mg tablet; Take 1 Tab by mouth three (3) times daily. Indications: Muscle Spasm  Dispense: 90 Tab; Refill: 2    5. Accidental bee sting  - predniSONE (DELTASONE) 20 mg tablet; TAKE 1 TABLET BY MOUTH TWICE DAILY FOR 5 DAYS  Dispense: 10 Tab; Refill: 0      I have discussed the diagnosis with the patient and the intended plan as seen in the above orders. The patient has received an after-visit summary and questions were answered concerning future plans. I have discussed medication side effects and warnings with the patient as well. Patient verbalizes understanding of plan of care and denies further questions or concerns at this time. Informed patient to return to the office if symptoms worsen or if new symptoms arise. Follow-up Disposition:  Return if symptoms worsen or fail to improve.

## 2018-08-17 NOTE — PATIENT INSTRUCTIONS
Yeast Skin Infection: Care Instructions  Your Care Instructions    Yeast normally lives on your skin. Sometimes too much yeast can overgrow in certain areas of the skin and cause an infection. The infection causes red, scaly, moist patches on your skin that may itch. Common areas for skin yeast infections are skin folds under the breasts or belly area. The warm and moist areas in the skin folds can make it easier for yeast to overgrow. Yeast infections also can be found on other parts of the body such as the groin or armpits. You will probably get a cream or ointment that contains an antifungal medicine. Examples of these are miconazole and clotrimazole. You put it on your skin to treat the infection. Your doctor may give you a prescription for the cream or ointment. Or you may be able to buy it without a prescription at most drugstores. If the infection is severe, the doctor will prescribe antifungal pills. A yeast infection usually goes away after about a week of treatment. But it's important to use the medicine for as long as your doctor tells you to. Follow-up care is a key part of your treatment and safety. Be sure to make and go to all appointments, and call your doctor if you are having problems. It's also a good idea to know your test results and keep a list of the medicines you take. How can you care for yourself at home? · Be safe with medicines. Take your medicines exactly as prescribed. Call your doctor if you think you are having a problem with your medicine. · Keep your skin clean and dry. Your doctor may suggest using powder that contains an antifungal medicine in the skin folds. · Wear loose clothing. When should you call for help? Call your doctor now or seek immediate medical care if:    · You have symptoms of infection, such as:  ¨ Increased pain, swelling, warmth, or redness. ¨ Red streaks leading from the area. ¨ Pus draining from the area.   ¨ A fever.    Watch closely for changes in your health, and be sure to contact your doctor if:    · You do not get better as expected. Where can you learn more? Go to http://jonathon-margareth.info/. Enter K706 in the search box to learn more about \"Yeast Skin Infection: Care Instructions. \"  Current as of: October 5, 2017  Content Version: 11.7  © 1090-0314 [employer]. Care instructions adapted under license by [employer] (which disclaims liability or warranty for this information). If you have questions about a medical condition or this instruction, always ask your healthcare professional. [address] any warranty or liability for your use of this information.

## 2018-08-21 ENCOUNTER — TELEPHONE (OUTPATIENT)
Dept: WOUND CARE | Age: 58
End: 2018-08-21

## 2018-08-21 DIAGNOSIS — F98.8 ATTENTION DEFICIT DISORDER, UNSPECIFIED HYPERACTIVITY PRESENCE: Primary | ICD-10-CM

## 2018-08-21 RX ORDER — ALPRAZOLAM 1 MG/1
1.5 TABLET ORAL
Qty: 90 TAB | Refills: 1 | Status: CANCELLED | OUTPATIENT
Start: 2018-08-21

## 2018-08-21 NOTE — TELEPHONE ENCOUNTER
Patient states in clinic Friday that she didn't want them on the refill list because she thought Dr. Indy Rg would not prescribe them.

## 2018-08-21 NOTE — TELEPHONE ENCOUNTER
Patient called stating that she came in on Friday for medication refill and did not receive these two prescriptions.

## 2018-08-28 ENCOUNTER — TELEPHONE (OUTPATIENT)
Dept: WOUND CARE | Age: 58
End: 2018-08-28

## 2018-08-30 ENCOUNTER — TELEPHONE (OUTPATIENT)
Dept: WOUND CARE | Age: 58
End: 2018-08-30

## 2018-08-30 RX ORDER — DEXTROAMPHETAMINE SACCHARATE, AMPHETAMINE ASPARTATE, DEXTROAMPHETAMINE SULFATE AND AMPHETAMINE SULFATE 3.75; 3.75; 3.75; 3.75 MG/1; MG/1; MG/1; MG/1
TABLET ORAL
Qty: 90 TAB | Refills: 0 | Status: SHIPPED | OUTPATIENT
Start: 2018-08-30 | End: 2018-09-24 | Stop reason: SDUPTHER

## 2018-08-30 NOTE — TELEPHONE ENCOUNTER
Patient in office today and able to speak with her. Prescription for lorazepam was provided at her last office visit. Did not discuss need for Adderral refill and hence why it was not provided. Prescription provided today. She will follow up in 1 month.

## 2018-08-31 ENCOUNTER — OFFICE VISIT (OUTPATIENT)
Dept: OBGYN CLINIC | Age: 58
End: 2018-08-31

## 2018-08-31 VITALS
DIASTOLIC BLOOD PRESSURE: 70 MMHG | SYSTOLIC BLOOD PRESSURE: 122 MMHG | WEIGHT: 219 LBS | BODY MASS INDEX: 36.49 KG/M2 | HEIGHT: 65 IN

## 2018-08-31 DIAGNOSIS — N94.10 DYSPAREUNIA IN FEMALE: ICD-10-CM

## 2018-08-31 DIAGNOSIS — Z01.419 ENCOUNTER FOR GYNECOLOGICAL EXAMINATION (GENERAL) (ROUTINE) WITHOUT ABNORMAL FINDINGS: Primary | ICD-10-CM
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
## 2018-08-31 NOTE — PATIENT INSTRUCTIONS
Breast Self-Exam: Care Instructions  Your Care Instructions    A breast self-exam is when you check your breasts for lumps or changes. This regular exam helps you learn how your breasts normally look and feel. Most breast problems or changes are not because of cancer. Breast self-exam is not a substitute for a mammogram. Having regular breast exams by your doctor and regular mammograms improve your chances of finding any problems with your breasts. Some women set a time each month to do a step-by-step breast self-exam. Other women like a less formal system. They might look at their breasts as they brush their teeth, or feel their breasts once in a while in the shower. If you notice a change in your breast, tell your doctor. Follow-up care is a key part of your treatment and safety. Be sure to make and go to all appointments, and call your doctor if you are having problems. It's also a good idea to know your test results and keep a list of the medicines you take. How do you do a breast self-exam?  · The best time to examine your breasts is usually one week after your menstrual period begins. Your breasts should not be tender then. If you do not have periods, you might do your exam on a day of the month that is easy to remember. · To examine your breasts:  ¨ Remove all your clothes above the waist and lie down. When you are lying down, your breast tissue spreads evenly over your chest wall, which makes it easier to feel all your breast tissue. ¨ Use the pads-not the fingertips-of the 3 middle fingers of your left hand to check your right breast. Move your fingers slowly in small coin-sized circles that overlap. ¨ Use three levels of pressure to feel of all your breast tissue. Use light pressure to feel the tissue close to the skin surface. Use medium pressure to feel a little deeper. Use firm pressure to feel your tissue close to your breastbone and ribs.  Use each pressure level to feel your breast tissue before moving on to the next spot. ¨ Check your entire breast, moving up and down as if following a strip from the collarbone to the bra line, and from the armpit to the ribs. Repeat until you have covered the entire breast.  ¨ Repeat this procedure for your left breast, using the pads of the 3 middle fingers of your right hand. · To examine your breasts while in the shower:  ¨ Place one arm over your head and lightly soap your breast on that side. ¨ Using the pads of your fingers, gently move your hand over your breast (in the strip pattern described above), feeling carefully for any lumps or changes. ¨ Repeat for the other breast.  · Have your doctor inspect anything you notice to see if you need further testing. Where can you learn more? Go to http://jonathon-margareth.info/. Enter P148 in the search box to learn more about \"Breast Self-Exam: Care Instructions. \"  Current as of: May 12, 2017  Content Version: 11.7  © 6305-6994 [employer]. Care instructions adapted under license by [employer] (which disclaims liability or warranty for this information). If you have questions about a medical condition or this instruction, always ask your healthcare professional. [place] [address] any warranty or liability for your use of this information.

## 2018-09-04 ENCOUNTER — TELEPHONE (OUTPATIENT)
Dept: FAMILY MEDICINE CLINIC | Age: 58
End: 2018-09-04

## 2018-09-04 NOTE — TELEPHONE ENCOUNTER
Message forwarded from call center    [facility] is calling about an order for a arm sleeve and night sleeve. Their number is [phone] ext. [phone]. Her name is Fina.

## 2018-09-13 NOTE — TELEPHONE ENCOUNTER
[facility] authorization called regarding pending request for patient to have \"compression stockings. \" This request was for patient to use \"[employer]\" but they are not in network. Joleen Mosquera is going to switch the order to a in network facility so these may be approved for patient.

## 2018-09-19 RX ORDER — ONDANSETRON 8 MG/1
TABLET, ORALLY DISINTEGRATING ORAL
Qty: 20 TAB | Refills: 0 | Status: SHIPPED | OUTPATIENT
Start: 2018-09-19 | End: 2018-11-09 | Stop reason: SDUPTHER

## 2018-09-24 ENCOUNTER — OFFICE VISIT (OUTPATIENT)
Dept: FAMILY MEDICINE CLINIC | Age: 58
End: 2018-09-24

## 2018-09-24 VITALS
OXYGEN SATURATION: 98 % | RESPIRATION RATE: 18 BRPM | WEIGHT: 220 LBS | HEART RATE: 78 BPM | TEMPERATURE: 98.1 F | SYSTOLIC BLOOD PRESSURE: 128 MMHG | HEIGHT: 65 IN | DIASTOLIC BLOOD PRESSURE: 80 MMHG | BODY MASS INDEX: 36.65 KG/M2

## 2018-09-24 DIAGNOSIS — L29.9 ITCHING: ICD-10-CM

## 2018-09-24 DIAGNOSIS — M54.50 CHRONIC BILATERAL LOW BACK PAIN WITHOUT SCIATICA: ICD-10-CM

## 2018-09-24 DIAGNOSIS — F41.9 ANXIETY: ICD-10-CM

## 2018-09-24 DIAGNOSIS — J31.0 CHRONIC RHINITIS: ICD-10-CM

## 2018-09-24 DIAGNOSIS — F98.8 ATTENTION DEFICIT DISORDER, UNSPECIFIED HYPERACTIVITY PRESENCE: Primary | ICD-10-CM

## 2018-09-24 DIAGNOSIS — G89.29 CHRONIC BILATERAL LOW BACK PAIN WITHOUT SCIATICA: ICD-10-CM

## 2018-09-24 RX ORDER — ALPRAZOLAM 1 MG/1
1.5 TABLET ORAL
Qty: 90 TAB | Refills: 1 | Status: CANCELLED | OUTPATIENT
Start: 2018-09-24

## 2018-09-24 RX ORDER — DEXTROAMPHETAMINE SACCHARATE, AMPHETAMINE ASPARTATE, DEXTROAMPHETAMINE SULFATE AND AMPHETAMINE SULFATE 3.75; 3.75; 3.75; 3.75 MG/1; MG/1; MG/1; MG/1
TABLET ORAL
Qty: 90 TAB | Refills: 0 | Status: SHIPPED | OUTPATIENT
Start: 2018-09-24 | End: 2019-01-25 | Stop reason: DRUGHIGH

## 2018-09-24 RX ORDER — ESTROGENS, CONJUGATED 0.62 MG/1
0.62 TABLET, FILM COATED ORAL
Status: CANCELLED | OUTPATIENT
Start: 2018-09-24

## 2018-09-24 NOTE — PROGRESS NOTES
Identified pt with two pt identifiers(name and ). Chief Complaint   Patient presents with    Nasal Congestion     nose has been running for over a year.  Medication Refill    New Order     needs referral to Dr. Valentina Munroe and [facility] Psychiatric Due   Topic    COLONOSCOPY        Wt Readings from Last 3 Encounters:   18 220 lb (99.8 kg)   18 219 lb (99.3 kg)   18 220 lb (99.8 kg)     Temp Readings from Last 3 Encounters:   18 98.1 °F (36.7 °C) (Oral)   18 98.5 °F (36.9 °C) (Oral)   18 98.4 °F (36.9 °C) (Oral)     BP Readings from Last 3 Encounters:   18 128/80   18 122/70   18 130/88     Pulse Readings from Last 3 Encounters:   18 78   18 (!) 55   18 82         Learning Assessment:  :     Learning Assessment 2016 2015 2015 1/3/2014   PRIMARY LEARNER Patient Patient Patient Patient   HIGHEST LEVEL OF EDUCATION - PRIMARY LEARNER  - - - 4 Luis Antonio LEARNER - NONE - COGNITIVE   CO-LEARNER CAREGIVER - No - -   PRIMARY LANGUAGE ENGLISH ENGLISH ENGLISH ENGLISH   LEARNER PREFERENCE PRIMARY DEMONSTRATION DEMONSTRATION VIDEOS READING     - - - VIDEOS   ANSWERED BY patient  patient patient patient   RELATIONSHIP SELF SELF SELF SELF       Depression Screening:  :     PHQ over the last two weeks 2018   Little interest or pleasure in doing things Nearly every day   Feeling down, depressed, irritable, or hopeless More than half the days   Total Score PHQ 2 5       Fall Risk Assessment:  :     Fall Risk Assessment, last 12 mths 2018   Able to walk? Yes   Fall in past 12 months? Yes   Fall with injury? Yes   Number of falls in past 12 months 5   Fall Risk Score 6       Abuse Screening:  :     Abuse Screening Questionnaire 2018 2015 3/10/2014   Do you ever feel afraid of your partner? N N N   Are you in a relationship with someone who physically or mentally threatens you?  N N N   Is it safe for you to go home? Y Y Y       Coordination of Care Questionnaire:  :     1) Have you been to an emergency room, urgent care clinic since your last visit? no   Hospitalized since your last visit? no             2) Have you seen or consulted any other health care providers outside of [address] [place] [address] since your last visit? no  (Include any pap smears or colon screenings in this section.)    3) Do you have an Advance Directive on file? no  Are you interested in receiving information about Advance Directives? no    Reviewed record in preparation for visit and have obtained necessary documentation. Medication reconciliation up to date and corrected with patient at this time.

## 2018-09-24 NOTE — PATIENT INSTRUCTIONS
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
## 2018-09-26 ENCOUNTER — TELEPHONE (OUTPATIENT)
Dept: FAMILY MEDICINE CLINIC | Age: 58
End: 2018-09-26

## 2018-09-26 NOTE — TELEPHONE ENCOUNTER
Patient called and stated that naproxn is not working and she is asking if Dr. Lee Foreman will call in diclofenac for her to the [facility].   And questions she can be reached at [phone]

## 2018-09-28 RX ORDER — HYDROXYZINE 25 MG/1
25 TABLET, FILM COATED ORAL
Qty: 30 TAB | Refills: 2 | Status: ON HOLD | OUTPATIENT
Start: 2018-09-28 | End: 2019-03-29

## 2018-09-28 RX ORDER — ESTROGENS, CONJUGATED 0.62 MG/1
0.62 TABLET, FILM COATED ORAL
OUTPATIENT
Start: 2018-09-28

## 2018-10-05 NOTE — TELEPHONE ENCOUNTER
----- Message from Tal Jain sent at 10/4/2018  5:07 PM EDT -----  Regarding: /telephone  Contact: [phone]  Pt wanted to get message to Dr. Angela Sams that she checked with the [facility] and it is ok to fill \"Pamprin\".

## 2018-10-25 ENCOUNTER — TELEPHONE (OUTPATIENT)
Dept: FAMILY MEDICINE CLINIC | Age: 58
End: 2018-10-25

## 2018-10-25 NOTE — TELEPHONE ENCOUNTER
Patient called back and stated that she was on the phone with Rich Cooks and the name of her pain management doctor is Dr. Estelle Lo [phone]

## 2018-10-25 NOTE — TELEPHONE ENCOUNTER
Patient informed me that she spoke with GYN and the Premarin is ok to take and she would like to have it refilled. Patient asked if Dr. Ifrah Mark would call Dr. Barbra Hill and ask if he would review paperwork for patient. Patient was disconnected before number could be provided.

## 2018-11-09 RX ORDER — ONDANSETRON 8 MG/1
TABLET, ORALLY DISINTEGRATING ORAL
Qty: 20 TAB | Refills: 3 | Status: SHIPPED | OUTPATIENT
Start: 2018-11-09 | End: 2019-08-06 | Stop reason: SDUPTHER

## 2018-11-19 ENCOUNTER — OFFICE VISIT (OUTPATIENT)
Dept: FAMILY MEDICINE CLINIC | Age: 58
End: 2018-11-19

## 2018-11-19 ENCOUNTER — TELEPHONE (OUTPATIENT)
Dept: FAMILY MEDICINE CLINIC | Age: 58
End: 2018-11-19

## 2018-11-19 VITALS
OXYGEN SATURATION: 98 % | TEMPERATURE: 98.7 F | DIASTOLIC BLOOD PRESSURE: 80 MMHG | SYSTOLIC BLOOD PRESSURE: 118 MMHG | RESPIRATION RATE: 18 BRPM | BODY MASS INDEX: 36.49 KG/M2 | HEIGHT: 65 IN | HEART RATE: 78 BPM | WEIGHT: 219 LBS
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
## 2018-11-19 DIAGNOSIS — H65.01 RIGHT ACUTE SEROUS OTITIS MEDIA, RECURRENCE NOT SPECIFIED: ICD-10-CM

## 2018-11-19 DIAGNOSIS — M25.561 ACUTE PAIN OF RIGHT KNEE: ICD-10-CM

## 2018-11-19 DIAGNOSIS — S90.212A SUBUNGUAL HEMATOMA OF GREAT TOE OF LEFT FOOT, INITIAL ENCOUNTER: ICD-10-CM

## 2018-11-19 DIAGNOSIS — M79.602 PAIN OF LEFT UPPER EXTREMITY: ICD-10-CM

## 2018-11-19 RX ORDER — AMOXICILLIN 875 MG/1
875 TABLET, FILM COATED ORAL 2 TIMES DAILY
Qty: 20 TAB | Refills: 0 | Status: SHIPPED | OUTPATIENT
Start: 2018-11-19 | End: 2018-11-29

## 2018-11-19 NOTE — PROGRESS NOTES
Identified pt with two pt identifiers(name and ). Chief Complaint   Patient presents with    Fall     fell twice on thursday.  Knee Pain     right knee down to foot    Elbow Pain     Left elbow has knot from fall    Ear Pain     right ear. Health Maintenance Due   Topic    Shingrix Vaccine Age 50> (1 of 2)    COLONOSCOPY     MEDICARE YEARLY EXAM        Wt Readings from Last 3 Encounters:   18 219 lb (99.3 kg)   18 220 lb (99.8 kg)   18 219 lb (99.3 kg)     Temp Readings from Last 3 Encounters:   18 98.7 °F (37.1 °C) (Oral)   18 98.1 °F (36.7 °C) (Oral)   18 98.5 °F (36.9 °C) (Oral)     BP Readings from Last 3 Encounters:   18 118/80   18 128/80   18 122/70     Pulse Readings from Last 3 Encounters:   18 78   18 78   18 (!) 55         Learning Assessment:  :     Learning Assessment 2016 2015 2015 1/3/2014   PRIMARY LEARNER Patient Patient Patient Patient   HIGHEST LEVEL OF EDUCATION - PRIMARY LEARNER  - - - 4 YEARS OF COLLEGE   BARRIERS PRIMARY LEARNER - NONE - COGNITIVE   CO-LEARNER CAREGIVER - No - -   PRIMARY LANGUAGE ENGLISH ENGLISH ENGLISH ENGLISH   LEARNER PREFERENCE PRIMARY DEMONSTRATION DEMONSTRATION VIDEOS READING     - - - VIDEOS   ANSWERED BY patient  patient patient patient   RELATIONSHIP SELF SELF SELF SELF       Depression Screening:  :     PHQ over the last two weeks 2018   Little interest or pleasure in doing things Nearly every day   Feeling down, depressed, irritable, or hopeless More than half the days   Total Score PHQ 2 5       Fall Risk Assessment:  :     Fall Risk Assessment, last 12 mths 2018   Able to walk? Yes   Fall in past 12 months? Yes   Fall with injury? Yes   Number of falls in past 12 months 5   Fall Risk Score 6       Abuse Screening:  :     Abuse Screening Questionnaire 2018 2015 3/10/2014   Do you ever feel afraid of your partner?  N N N   Are you in a relationship with someone who physically or mentally threatens you? N N N   Is it safe for you to go home? Y Y Y       Coordination of Care Questionnaire:  :     1) Have you been to an emergency room, urgent care clinic since your last visit? no   Hospitalized since your last visit? no             2) Have you seen or consulted any other health care providers outside of [address] [place] [address] since your last visit? no  (Include any pap smears or colon screenings in this section.)    3) Do you have an Advance Directive on file? no  Are you interested in receiving information about Advance Directives? no    Reviewed record in preparation for visit and have obtained necessary documentation. Medication reconciliation up to date and corrected with patient at this time.
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
## 2018-11-19 NOTE — TELEPHONE ENCOUNTER
Patient called stating that she forgot to ask Dr Flori Colin for pain medicine. Patient states ibuprofen hasn't been working.     Best contact: [phone]

## 2018-11-19 NOTE — PATIENT INSTRUCTIONS
Middle Ear Fluid: Care Instructions  Your Care Instructions    Fluid often builds up inside the ear during a cold or allergies. Usually the fluid drains away, but sometimes a small tube in the ear, called the eustachian tube, stays blocked for months. Symptoms of fluid buildup may include:  · Popping, ringing, or a feeling of fullness or pressure in the ear. · Trouble hearing. · Balance problems and dizziness. In most cases, you can treat yourself at home. Follow-up care is a key part of your treatment and safety. Be sure to make and go to all appointments, and call your doctor if you are having problems. It's also a good idea to know your test results and keep a list of the medicines you take. How can you care for yourself at home? · In most cases, the fluid clears up within a few months without treatment. You may need more tests if the fluid does not clear up after 3 months. Preventing Falls: Care Instructions  Your Care Instructions    Getting around your home safely can be a challenge if you have injuries or health problems that make it easy for you to fall. Loose rugs and furniture in walkways are among the dangers for many older people who have problems walking or who have poor eyesight. People who have conditions such as arthritis, osteoporosis, or dementia also have to be careful not to fall. You can make your home safer with a few simple measures. Follow-up care is a key part of your treatment and safety. Be sure to make and go to all appointments, and call your doctor if you are having problems. It's also a good idea to know your test results and keep a list of the medicines you take. How can you care for yourself at home? Taking care of yourself  · You may get dizzy if you do not drink enough water. To prevent dehydration, drink plenty of fluids, enough so that your urine is light yellow or clear like water. Choose water and other caffeine-free clear liquids.  If you have kidney, heart, or liver disease and have to limit fluids, talk with your doctor before you increase the amount of fluids you drink. · Exercise regularly to improve your strength, muscle tone, and balance. Walk if you can. Swimming may be a good choice if you cannot walk easily. · Have your vision and hearing checked each year or any time you notice a change. If you have trouble seeing and hearing, you might not be able to avoid objects and could lose your balance. · Know the side effects of the medicines you take. Ask your doctor or pharmacist whether the medicines you take can affect your balance. Sleeping pills or sedatives can affect your balance. · Limit the amount of alcohol you drink. Alcohol can impair your balance and other senses. · Ask your doctor whether calluses or corns on your feet need to be removed. If you wear loose-fitting shoes because of calluses or corns, you can lose your balance and fall. · Talk to your doctor if you have numbness in your feet. Preventing falls at home  · Remove raised doorway thresholds, throw rugs, and clutter. Repair loose carpet or raised areas in the floor. · Move furniture and electrical cords to keep them out of walking paths. · Use nonskid floor wax, and wipe up spills right away, especially on ceramic tile floors. · If you use a walker or cane, put rubber tips on it. If you use crutches, clean the bottoms of them regularly with an abrasive pad, such as steel wool. · Keep your house well lit, especially [place], and outside walkways. Use night-lights in areas such as hallways and bathrooms. Add extra light switches or use remote switches (such as switches that go on or off when you clap your hands) to make it easier to turn lights on if you have to get up during the night. · Install sturdy handrails on stairways. · Move items in your cabinets so that the things you use a lot are on the lower shelves (about waist level).   · Keep a cordless phone and a flashlight with new batteries by your bed. If possible, put a phone in each of the main rooms of your house, or carry a cell phone in case you fall and cannot reach a phone. Or, you can wear a device around your neck or wrist. You push a button that sends a signal for help. · Wear low-heeled shoes that fit well and give your feet good support. Use footwear with nonskid soles. Check the heels and soles of your shoes for wear. Repair or replace worn heels or soles. · Do not wear socks without shoes on wood floors. · Walk on the grass when the sidewalks are slippery. If you live in an area that gets snow and ice in the winter, sprinkle salt on slippery steps and sidewalks. Preventing falls in the bath  · Install grab bars and nonskid mats inside and outside your shower or tub and near the toilet and sinks. · Use shower chairs and bath benches. · Use a hand-held shower head that will allow you to sit while showering. · Get into a tub or shower by putting the weaker leg in first. Get out of a tub or shower with your strong side first.  · Repair loose toilet seats and consider installing a raised toilet seat to make getting on and off the toilet easier. · Keep your bathroom door unlocked while you are in the shower. Where can you learn more? Go to http://jonathon-margareth.info/. Enter 0476 [id number] in the search box to learn more about \"Preventing Falls: Care Instructions. \"  Current as of: March 16, 2018  Content Version: 11.8  © 2288-3811 [employer]. Care instructions adapted under license by [employer] (which disclaims liability or warranty for this information). If you have questions about a medical condition or this instruction, always ask your healthcare professional. [address] any warranty or liability for your use of this information. · If your doctor prescribed antibiotics, take them as directed.  Do not stop taking them just because you feel better. You need to take the full course of antibiotics. When should you call for help? Call your doctor now or seek immediate medical care if:    · You have symptoms of infection, such as:  ? Increased pain, swelling, warmth, or redness. ? Pus draining from the area. ? A fever.    Watch closely for changes in your health, and be sure to contact your doctor if:    · You notice changes in hearing.     · You do not get better as expected. Where can you learn more? Go to http://jonathon-margareth.info/. Enter D091 in the search box to learn more about \"Middle Ear Fluid: Care Instructions. \"  Current as of: March 28, 2018  Content Version: 11.8  © 6059-5119 [employer]. Care instructions adapted under license by [employer] (which disclaims liability or warranty for this information). If you have questions about a medical condition or this instruction, always ask your healthcare professional. [place] [address] any warranty or liability for your use of this information.

## 2018-11-21 ENCOUNTER — HOSPITAL ENCOUNTER (EMERGENCY)
Age: 58
Discharge: HOME | End: 2018-11-21
Attending: EMERGENCY MEDICINE
Payer: MEDICARE

## 2018-11-21 ENCOUNTER — APPOINTMENT (OUTPATIENT)
Dept: GENERAL RADIOLOGY | Age: 58
End: 2018-11-21
Attending: EMERGENCY MEDICINE
Payer: MEDICARE

## 2018-11-21 VITALS
SYSTOLIC BLOOD PRESSURE: 129 MMHG | TEMPERATURE: 97.4 F | BODY MASS INDEX: 34.99 KG/M2 | RESPIRATION RATE: 18 BRPM | HEART RATE: 61 BPM | WEIGHT: 210 LBS | HEIGHT: 65 IN | OXYGEN SATURATION: 100 % | DIASTOLIC BLOOD PRESSURE: 94 MMHG

## 2018-11-21 DIAGNOSIS — M25.522 LEFT ELBOW PAIN: Primary | ICD-10-CM

## 2018-11-21 DIAGNOSIS — M79.602 PAIN OF LEFT UPPER EXTREMITY: ICD-10-CM
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
## 2018-11-21 DIAGNOSIS — M79.604 PAIN OF RIGHT LOWER EXTREMITY: ICD-10-CM

## 2018-11-21 PROCEDURE — 73080 X-RAY EXAM OF ELBOW: CPT

## 2018-11-21 PROCEDURE — 73502 X-RAY EXAM HIP UNI 2-3 VIEWS: CPT

## 2018-11-21 PROCEDURE — 73562 X-RAY EXAM OF KNEE 3: CPT

## 2018-11-21 PROCEDURE — 99282 EMERGENCY DEPT VISIT SF MDM: CPT

## 2018-11-21 PROCEDURE — 73610 X-RAY EXAM OF ANKLE: CPT

## 2018-11-21 PROCEDURE — 72100 X-RAY EXAM L-S SPINE 2/3 VWS: CPT

## 2018-11-21 PROCEDURE — 73090 X-RAY EXAM OF FOREARM: CPT

## 2018-11-21 RX ORDER — LANOLIN ALCOHOL/MO/W.PET/CERES
1000 CREAM (GRAM) TOPICAL
COMMUNITY
End: 2019-10-15

## 2018-11-21 NOTE — ED TRIAGE NOTES
On Thursday pt fell and re-injured her left arm. Pt reports falling in May and fractured her left arm. Pt reports a painful hard bump distal to left elbow, pain in right hip, and lower back.
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
## 2018-11-21 NOTE — TELEPHONE ENCOUNTER
Pt calling to ask for something stronger for pain as nothing is helping please call pt at [phone] and advise

## 2018-11-22 NOTE — DISCHARGE INSTRUCTIONS

## 2018-11-23 ENCOUNTER — PATIENT OUTREACH (OUTPATIENT)
Dept: FAMILY MEDICINE CLINIC | Age: 58
End: 2018-11-23

## 2018-11-23 RX ORDER — OXYCODONE AND ACETAMINOPHEN 5; 325 MG/1; MG/1
1 TABLET ORAL
Qty: 15 TAB | Refills: 0 | Status: SHIPPED | OUTPATIENT
Start: 2018-11-23 | End: 2019-03-12 | Stop reason: ALTCHOICE

## 2018-11-23 NOTE — PROGRESS NOTES
ED Discharge Follow-Up Date/Time:     2018 2:44 PM 
 
Patient presented to [facility] ED  ED on 18 and was diagnosed with Fall, L elbow pain, R lower extremity pain. Top Challenges reviewed with the provider Uncontrolled pain, would like to see Orthopedic MD will follow up with insurance to see what referral she will need Method of communication with provider Nurse Navigator(NN) contacted the  patient  by telephone to perform post ED discharge assessment. Verified name and  with patient as identifiers. Provided introduction to self, and explanation of the Nurse Navigator role. Patient reported assessment: having 2 falls,  Still unable to control pain however \"they won't order me anything for the pain,  I would like to see an orthopedic MD they should take care of it for me\". Medication(s):  
New Medications at Discharge: none Changed Medications at Discharge: none Discontinued Medications at Discharge: none There were no new medications identified at this time. Reviewed discharge instructions and red flags with  patient who voiced understanding. Patient given an opportunity to ask questions and does not have any further questions or concerns at this time. The patient agrees to contact the PCP office for questions related to their healthcare. Patient reminded that there are physicians on call 24 hours a day / 7 days a week (M-F 5pm to 8am and from Friday 5pm until Monday 8am for the weekend) should the patient have questions or concerns. NN provided contact information for future reference. Offered follow up appointment with PCP: yes (pt states that she does not want to go PCP since they referred pt to ED,  No one will prescribe narcotic pain medications,  Would like to check with insurance to see what Orthopedic MD she can see. [facility] follow up appointment(s):  
Future Appointments Date Time Provider Roe Pedraza 1/10/2019  2:30 PM MAKEDA Barrera-[facility] follow up appointment(s): none [facility]:  n/a  
 www. [facility] 9 AM- 9 PM.   Phone [phone] Goals  Patient/Family verbalizes understanding of self-management of chronic disease. Patient will become more engaged in her own health and managing aspects of daily life like finances, diet, etc 
  
  Reduce ED Utilization  Reduce ED Utilization  Supportive resources in place to maintain patient in the community (ie. Home Health, DME equipment, refer to, medication assistant plan, etc.)

## 2018-11-23 NOTE — TELEPHONE ENCOUNTER
Patient evaluated on 11/19/18 after sustaining ground level fall. Patient called and advised that Rx for limited supply of Percocet 5-325 mg will be prescribed. Advised that prescription may be picked up at our office - patient states that she is out with friends and will  Rx on Monday.

## 2018-11-27 ENCOUNTER — TELEPHONE (OUTPATIENT)
Dept: FAMILY MEDICINE CLINIC | Age: 58
End: 2018-11-27

## 2018-11-27 NOTE — TELEPHONE ENCOUNTER
Patient called and stated that she needs Dr. Estephania Flores to recommend orthopedic specialist for her right leg and left forearm. Please call at [phone] to advise.

## 2018-11-30 ENCOUNTER — PATIENT OUTREACH (OUTPATIENT)
Dept: FAMILY MEDICINE CLINIC | Age: 58
End: 2018-11-30

## 2018-12-04 ENCOUNTER — PATIENT OUTREACH (OUTPATIENT)
Dept: FAMILY MEDICINE CLINIC | Age: 58
End: 2018-12-04

## 2018-12-06 ENCOUNTER — TELEPHONE (OUTPATIENT)
Dept: FAMILY MEDICINE CLINIC | Age: 58
End: 2018-12-06

## 2018-12-06 DIAGNOSIS — G89.29 CHRONIC MIDLINE LOW BACK PAIN WITHOUT SCIATICA: ICD-10-CM

## 2018-12-06 DIAGNOSIS — M54.50 CHRONIC MIDLINE LOW BACK PAIN WITHOUT SCIATICA: ICD-10-CM

## 2018-12-06 NOTE — TELEPHONE ENCOUNTER
Patient is calling to check status on letter or referral that is needed for [facility] for her to be able to go to [facility]. Please call her at [phone] to advise.

## 2018-12-10 RX ORDER — IBUPROFEN 600 MG/1
TABLET ORAL
Qty: 90 TAB | Refills: 1 | Status: SHIPPED | OUTPATIENT
Start: 2018-12-10 | End: 2019-02-10 | Stop reason: SDUPTHER

## 2019-01-03 ENCOUNTER — TELEPHONE (OUTPATIENT)
Dept: OBGYN CLINIC | Age: 59
End: 2019-01-03

## 2019-01-03 NOTE — TELEPHONE ENCOUNTER
Patient calling stating that she has not had periods in 10 years and now last Friday she started having spotting that is dark brown in color and cramping. She is passing pea sized clots that are mucousy and stringy. She has pain in her side & back - pain scale 6 out 10 that she is unsure if related to the spotting or kidney stones since she has a hx of kidney stones. When she lays down with a heating pad - it takes the edge off the pain and feels better but has taken ibuprofen 800 mg q 8hours with no relief. Dr. Sara Saleem - office visit I know - Endo Bx with it? US?

## 2019-01-04 NOTE — TELEPHONE ENCOUNTER
Call received at 2:20pm    Patient calling back from yesterday. Patient placed on the schedule to be seen at 9:00am for an endometrial biopsy. ( ok per Dr. Amy Dockery)    Patient advised that she can premedicate with motrin prior to the procedure. Patient given instructions for office location. Patient verbalized understanding.

## 2019-01-09 ENCOUNTER — OFFICE VISIT (OUTPATIENT)
Dept: OBGYN CLINIC | Age: 59
End: 2019-01-09

## 2019-01-09 VITALS
SYSTOLIC BLOOD PRESSURE: 130 MMHG | BODY MASS INDEX: 35.79 KG/M2 | DIASTOLIC BLOOD PRESSURE: 78 MMHG | HEIGHT: 65 IN | WEIGHT: 214.8 LBS

## 2019-01-09 DIAGNOSIS — N95.0 PMB (POSTMENOPAUSAL BLEEDING): Primary | ICD-10-CM

## 2019-01-09 NOTE — PATIENT INSTRUCTIONS
Endometrial Biopsy: About This Test  What is it? An endometrial biopsy is a way for your doctor to take a small sample of the lining of the uterus (endometrium). The sample is looked at under a microscope for abnormal cells. An endometrial biopsy helps your doctor find problems in the endometrium. Why is this test done? An endometrial biopsy is done to check for cancer of the uterus. The test is also done if you have abnormal bleeding from your uterus or are having problems getting pregnant. The test results show how your body's hormones are affecting the lining of the uterus. How can you prepare for the test?  Talk to your doctor about all your health conditions before the test. For example, tell your doctor if you:  · Are or might be pregnant. An endometrial biopsy is not done during pregnancy. · Are taking any medicines. · Are allergic to any medicines. · Have had bleeding problems, or if you take aspirin or some other blood thinner. · Have been treated for an infection in your pelvic area. · Have any heart or lung problems. Other ways to prepare:  · Do not douche, use tampons, or use vaginal medicines for 24 hours before the test.  · Ask your doctor if you should take a pain reliever, such as ibuprofen (Advil or Motrin), 30 to 60 minutes before the test. This can help reduce any cramping pain that the test can cause. · Talk to your doctor if you have concerns about the need for the test, its risks, how it will be done, or what the results may mean. What happens before the test?  · You will empty your bladder just before the test.  · You will be asked to sign a consent form that says you understand the risks of the test and agree to have it done. What happens during the test?  · You will lie on an exam table. Your feet will be in stirrups. · The doctor may use a spray or injection to numb your cervix. The cervix is the opening to the uterus.   · The doctor will use a tool called a speculum to see the cervix. · Then the doctor will pass a thin tube through the cervix to take a sample of the uterus lining. You may feel a sharp cramp when the doctor collects the sample. · The sample is sent to a lab. How long does the test take? The test will take about 5 to 15 minutes. What happens after the test?  · You will probably be able to go home right away. · You likely will have mild vaginal bleeding and may have cramps for a few days after the test. The cramps may feel like bad menstrual cramps. · Ask your doctor if you can take an over-the-counter pain medicine, such as acetaminophen (Tylenol), ibuprofen (Advil, Motrin), or naproxen (Aleve). Be safe with medicines. Read and follow all instructions on the label. · Do not have sex, use tampons, or douche until the spotting stops. Use pads for vaginal bleeding or discharge. · Do not do strenuous exercise or heavy lifting for one day after your biopsy. Follow-up care is a key part of your treatment and safety. Be sure to make and go to all appointments, and call your doctor if you are having problems. It's also a good idea to keep a list of the medicines you take. Ask your doctor when you can expect to have your test results. Where can you learn more? Go to http://jonathon-margareth.info/. Enter [phone] in the search box to learn more about \"Endometrial Biopsy: About This Test.\"  Current as of: May 15, 2018  Content Version: 11.8  © 6698-8685 [employer]wise, Incorporated. Care instructions adapted under license by [employer] (which disclaims liability or warranty for this information). If you have questions about a medical condition or this instruction, always ask your healthcare professional. [address] any warranty or liability for your use of this information.
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
## 2019-01-10 ENCOUNTER — HOSPITAL ENCOUNTER (OUTPATIENT)
Dept: LAB | Age: 59
Discharge: HOME | End: 2019-01-10

## 2019-01-16 ENCOUNTER — OFFICE VISIT (OUTPATIENT)
Dept: FAMILY MEDICINE CLINIC | Age: 59
End: 2019-01-16

## 2019-01-16 VITALS
SYSTOLIC BLOOD PRESSURE: 127 MMHG | WEIGHT: 214 LBS | RESPIRATION RATE: 18 BRPM | HEART RATE: 101 BPM | BODY MASS INDEX: 35.65 KG/M2 | OXYGEN SATURATION: 96 % | DIASTOLIC BLOOD PRESSURE: 89 MMHG | TEMPERATURE: 98.6 F | HEIGHT: 65 IN

## 2019-01-16 DIAGNOSIS — J40 BRONCHITIS: Primary | ICD-10-CM

## 2019-01-16 RX ORDER — CEFDINIR 300 MG/1
300 CAPSULE ORAL 2 TIMES DAILY
Qty: 20 CAP | Refills: 0 | Status: SHIPPED | OUTPATIENT
Start: 2019-01-16 | End: 2019-01-26

## 2019-01-16 RX ORDER — BUSPIRONE HYDROCHLORIDE 10 MG/1
10 TABLET ORAL
COMMUNITY
Start: 2018-09-18 | End: 2019-03-13

## 2019-01-16 RX ORDER — HYDROCODONE BITARTRATE AND ACETAMINOPHEN 5; 325 MG/1; MG/1
TABLET ORAL
COMMUNITY
Start: 2018-12-03 | End: 2019-01-16 | Stop reason: SDUPTHER

## 2019-01-16 NOTE — PROGRESS NOTES
HISTORY OF PRESENT ILLNESS  Dian Zacarias is a 62 y.o. female. HPI  Pt presents with \"cough and medication refills\"    Pt is requesting refills of her oxycodone, xanax, and gabapentin today. Informed patient that as these are controlled substances, they need to come from her PCP and regular prescribing physician due to safety. Pt understood,and will make an appointment with Dr Gamal Franco asap. In addition, patient states that she continues to have a cough. She was placed on Amoxicillin in November for a cough, and states that she has finished all the antibiotics, but continues to have symptoms. She is able to cough up mucous. Wheezing noted at times. Review of Systems   Constitutional: Negative for fever. HENT: Positive for congestion. Respiratory: Positive for cough and sputum production. Gastrointestinal: Negative for diarrhea and vomiting. Physical Exam   Constitutional: She is oriented to person, place, and time. She appears well-developed and well-nourished. HENT:   Head: Normocephalic and atraumatic. Neck: Normal range of motion. Neck supple. Cardiovascular: Normal rate, regular rhythm and normal heart sounds. Pulmonary/Chest: Effort normal. She has no wheezes. She has rhonchi in the right lower field and the left lower field. Lymphadenopathy:     She has no cervical adenopathy. Neurological: She is alert and oriented to person, place, and time. Skin: Skin is warm and dry. Psychiatric: She has a normal mood and affect. Her behavior is normal.       ASSESSMENT and PLAN    ICD-10-CM ICD-9-CM    1. Bronchitis J40 490 cefdinir (OMNICEF) 300 mg capsule     Informed patient that I have sent medication to the pharmacy, and she should take as prescribed. Educated about staying well hydrated, and treating fever as needed. Should follow up with Dr Gamal Franco for routine controlled medications.     Pt informed to return to office with worsening of symptoms, or PRN with any questions or concerns. Pt verbalizes understanding of plan of care and denies further questions or concerns at this time.

## 2019-01-16 NOTE — PROGRESS NOTES
Identified pt with two pt identifiers(name and ). Chief Complaint   Patient presents with    Cough     here for f/u of cough s/p finishing antibx tx    Medication Refill     Pt here for refill of oxycodone, xanax, adderall, and gabapentin        Health Maintenance Due   Topic    Shingrix Vaccine Age 50> (1 of 2)    COLONOSCOPY     MEDICARE YEARLY EXAM        Wt Readings from Last 3 Encounters:   19 214 lb (97.1 kg)   19 214 lb 12.8 oz (97.4 kg)   18 210 lb (95.3 kg)     Temp Readings from Last 3 Encounters:   19 98.6 °F (37 °C) (Oral)   18 97.4 °F (36.3 °C)   18 98.7 °F (37.1 °C) (Oral)     BP Readings from Last 3 Encounters:   19 127/89   19 130/78   18 (!) 129/94     Pulse Readings from Last 3 Encounters:   19 (!) 101   18 61   18 78         Learning Assessment:  :     Learning Assessment 2016 2015 2015 1/3/2014   PRIMARY LEARNER Patient Patient Patient Patient   HIGHEST LEVEL OF EDUCATION - PRIMARY LEARNER  - - - Melany Salmon LEARNER - NONE - COGNITIVE   CO-LEARNER CAREGIVER - No - -   PRIMARY LANGUAGE ENGLISH ENGLISH ENGLISH ENGLISH   LEARNER PREFERENCE PRIMARY DEMONSTRATION DEMONSTRATION VIDEOS READING     - - - VIDEOS   ANSWERED BY patient  patient patient patient   RELATIONSHIP SELF SELF SELF SELF       Depression Screening:  :     PHQ over the last two weeks 2018   Little interest or pleasure in doing things Nearly every day   Feeling down, depressed, irritable, or hopeless More than half the days   Total Score PHQ 2 5       Fall Risk Assessment:  :     Fall Risk Assessment, last 12 mths 2018   Able to walk? Yes   Fall in past 12 months? Yes   Fall with injury? Yes   Number of falls in past 12 months 5   Fall Risk Score 6       Abuse Screening:  :     Abuse Screening Questionnaire 2018 2015 3/10/2014   Do you ever feel afraid of your partner?  N N N   Are you in a relationship with someone who physically or mentally threatens you? N N N   Is it safe for you to go home? Clyde Regalado       Coordination of Care Questionnaire:  :     1) Have you been to an emergency room, urgent care clinic since your last visit? no   Hospitalized since your last visit? no             2) Have you seen or consulted any other health care providers outside of [address] [place] [address] since your last visit? yes, has been seen by gyn  (Include any pap smears or colon screenings in this section.)    3) Do you have an Advance Directive on file? yes and it is on file at home  Are you interested in receiving information about Advance Directives? no    Patient is accompanied by her friend. I have received verbal consent from Deandra Haque to discuss any/all medical information while they are present in the room. Reviewed record in preparation for visit and have obtained necessary documentation. Medication reconciliation up to date and corrected with patient at this time.

## 2019-01-16 NOTE — PATIENT INSTRUCTIONS
Bronchitis: Care Instructions  Your Care Instructions    Bronchitis is inflammation of the bronchial tubes, which carry air to the lungs. The tubes swell and produce mucus, or phlegm. The mucus and inflamed bronchial tubes make you cough. You may have trouble breathing. Most cases of bronchitis are caused by viruses like those that cause colds. Antibiotics usually do not help and they may be harmful. Bronchitis usually develops rapidly and lasts about 2 to 3 weeks in otherwise healthy people. Follow-up care is a key part of your treatment and safety. Be sure to make and go to all appointments, and call your doctor if you are having problems. It's also a good idea to know your test results and keep a list of the medicines you take. How can you care for yourself at home? · Take all medicines exactly as prescribed. Call your doctor if you think you are having a problem with your medicine. · Get some extra rest.  · Take an over-the-counter pain medicine, such as acetaminophen (Tylenol), ibuprofen (Advil, Motrin), or naproxen (Aleve) to reduce fever and relieve body aches. Read and follow all instructions on the label. · Do not take two or more pain medicines at the same time unless the doctor told you to. Many pain medicines have acetaminophen, which is Tylenol. Too much acetaminophen (Tylenol) can be harmful. · Take an over-the-counter cough medicine that contains dextromethorphan to help quiet a dry, hacking cough so that you can sleep. Avoid cough medicines that have more than one active ingredient. Read and follow all instructions on the label. · Breathe moist air from a humidifier, hot shower, or sink filled with hot water. The heat and moisture will thin mucus so you can cough it out. · Do not smoke. Smoking can make bronchitis worse. If you need help quitting, talk to your doctor about stop-smoking programs and medicines. These can increase your chances of quitting for good.   When should you call for help? Call 911 anytime you think you may need emergency care. For example, call if:    · You have severe trouble breathing.    Call your doctor now or seek immediate medical care if:    · You have new or worse trouble breathing.     · You cough up dark brown or bloody mucus (sputum).     · You have a new or higher fever.     · You have a new rash.    Watch closely for changes in your health, and be sure to contact your doctor if:    · You cough more deeply or more often, especially if you notice more mucus or a change in the color of your mucus.     · You are not getting better as expected. Where can you learn more? Go to http://jonathon-margareth.info/. Enter H333 in the search box to learn more about \"Bronchitis: Care Instructions. \"  Current as of: December 6, 2017  Content Version: 11.8  © 9796-5911 [employer]. Care instructions adapted under license by [employer] (which disclaims liability or warranty for this information). If you have questions about a medical condition or this instruction, always ask your healthcare professional. [address] any warranty or liability for your use of this information.

## 2019-01-22 DIAGNOSIS — G89.29 CHRONIC MIDLINE LOW BACK PAIN WITHOUT SCIATICA: ICD-10-CM

## 2019-01-22 DIAGNOSIS — M54.50 CHRONIC MIDLINE LOW BACK PAIN WITHOUT SCIATICA: ICD-10-CM

## 2019-01-22 RX ORDER — FLUOXETINE HYDROCHLORIDE 40 MG/1
40 CAPSULE ORAL DAILY
Qty: 30 CAP | Refills: 2 | Status: SHIPPED | OUTPATIENT
Start: 2019-01-22 | End: 2019-03-12 | Stop reason: SDUPTHER

## 2019-01-23 NOTE — TELEPHONE ENCOUNTER
----- Message from Alejandra Watts sent at 1/22/2019  5:09 PM EST -----  Regarding: Dr Kevin Hi is following up on request for Gabapentin 400 mg which was to be sent to [address] [place] [address].  The pharmacy has not yet received it    Best contact # [phone]

## 2019-01-25 ENCOUNTER — OFFICE VISIT (OUTPATIENT)
Dept: FAMILY MEDICINE CLINIC | Age: 59
End: 2019-01-25

## 2019-01-25 VITALS
OXYGEN SATURATION: 98 % | TEMPERATURE: 97.9 F | HEART RATE: 95 BPM | BODY MASS INDEX: 35.65 KG/M2 | DIASTOLIC BLOOD PRESSURE: 80 MMHG | RESPIRATION RATE: 18 BRPM | HEIGHT: 65 IN | SYSTOLIC BLOOD PRESSURE: 128 MMHG | WEIGHT: 214 LBS

## 2019-01-25 DIAGNOSIS — F98.8 ATTENTION DEFICIT DISORDER, UNSPECIFIED HYPERACTIVITY PRESENCE: Primary | ICD-10-CM

## 2019-01-25 DIAGNOSIS — M54.50 CHRONIC MIDLINE LOW BACK PAIN WITHOUT SCIATICA: ICD-10-CM

## 2019-01-25 DIAGNOSIS — G89.29 CHRONIC MIDLINE LOW BACK PAIN WITHOUT SCIATICA: ICD-10-CM

## 2019-01-25 DIAGNOSIS — F41.9 ANXIETY: ICD-10-CM

## 2019-01-25 RX ORDER — DEXTROAMPHETAMINE SACCHARATE, AMPHETAMINE ASPARTATE, DEXTROAMPHETAMINE SULFATE AND AMPHETAMINE SULFATE 7.5; 7.5; 7.5; 7.5 MG/1; MG/1; MG/1; MG/1
30 TABLET ORAL 2 TIMES DAILY
Qty: 180 TAB | Refills: 0 | Status: SHIPPED | OUTPATIENT
Start: 2019-01-25 | End: 2019-04-25

## 2019-01-25 RX ORDER — GABAPENTIN 400 MG/1
800 CAPSULE ORAL 3 TIMES DAILY
Qty: 540 CAP | Refills: 1 | Status: SHIPPED | OUTPATIENT
Start: 2019-01-25 | End: 2019-11-05 | Stop reason: SDUPTHER

## 2019-01-25 RX ORDER — ALPRAZOLAM 1 MG/1
1.5 TABLET ORAL
Qty: 270 TAB | Refills: 0 | Status: SHIPPED | OUTPATIENT
Start: 2019-01-25 | End: 2019-04-10 | Stop reason: SDUPTHER

## 2019-01-25 NOTE — PROGRESS NOTES
Identified pt with two pt identifiers(name and ). Chief Complaint   Patient presents with    Medication Refill    Itchy Eye     and nose        Health Maintenance Due   Topic    Shingrix Vaccine Age 50> (1 of 2)    COLONOSCOPY     MEDICARE YEARLY EXAM        Wt Readings from Last 3 Encounters:   19 214 lb (97.1 kg)   19 214 lb (97.1 kg)   19 214 lb 12.8 oz (97.4 kg)     Temp Readings from Last 3 Encounters:   19 97.9 °F (36.6 °C) (Oral)   19 98.6 °F (37 °C) (Oral)   18 97.4 °F (36.3 °C)     BP Readings from Last 3 Encounters:   19 128/80   19 127/89   19 130/78     Pulse Readings from Last 3 Encounters:   19 95   19 (!) 101   18 61         Learning Assessment:  :     Learning Assessment 2016 2015 2015 1/3/2014   PRIMARY LEARNER Patient Patient Patient Patient   HIGHEST LEVEL OF EDUCATION - PRIMARY LEARNER  - - - 4 YEARS OF COLLEGE   BARRIERS PRIMARY LEARNER - NONE - COGNITIVE   CO-LEARNER CAREGIVER - No - -   PRIMARY LANGUAGE ENGLISH ENGLISH ENGLISH ENGLISH   LEARNER PREFERENCE PRIMARY DEMONSTRATION DEMONSTRATION VIDEOS READING     - - - VIDEOS   ANSWERED BY patient  patient patient patient   RELATIONSHIP SELF SELF SELF SELF       Depression Screening:  :     PHQ over the last two weeks 2018   Little interest or pleasure in doing things Nearly every day   Feeling down, depressed, irritable, or hopeless More than half the days   Total Score PHQ 2 5       Fall Risk Assessment:  :     Fall Risk Assessment, last 12 mths 2018   Able to walk? Yes   Fall in past 12 months? Yes   Fall with injury? Yes   Number of falls in past 12 months 5   Fall Risk Score 6       Abuse Screening:  :     Abuse Screening Questionnaire 2018 2015 3/10/2014   Do you ever feel afraid of your partner? N N N   Are you in a relationship with someone who physically or mentally threatens you? N N N   Is it safe for you to go home?  Gadiel Mathur Y       Coordination of Care Questionnaire:  :     1) Have you been to an emergency room, urgent care clinic since your last visit? no   Hospitalized since your last visit? no             2) Have you seen or consulted any other health care providers outside of [address] [place] [address] since your last visit? no  (Include any pap smears or colon screenings in this section.)    3) Do you have an Advance Directive on file? no  Are you interested in receiving information about Advance Directives? no    Reviewed record in preparation for visit and have obtained necessary documentation. Medication reconciliation up to date and corrected with patient at this time.
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
## 2019-01-25 NOTE — PATIENT INSTRUCTIONS
A Healthy Lifestyle: Care Instructions  Your Care Instructions    A healthy lifestyle can help you feel good, stay at a healthy weight, and have plenty of energy for both work and play. A healthy lifestyle is something you can share with your whole family. A healthy lifestyle also can lower your risk for serious health problems, such as high blood pressure, heart disease, and diabetes. You can follow a few steps listed below to improve your health and the health of your family. Follow-up care is a key part of your treatment and safety. Be sure to make and go to all appointments, and call your doctor if you are having problems. It's also a good idea to know your test results and keep a list of the medicines you take. How can you care for yourself at home? · Do not eat too much sugar, fat, or fast foods. You can still have dessert and treats now and then. The goal is moderation. · Start small to improve your eating habits. Pay attention to portion sizes, drink less juice and soda pop, and eat more fruits and vegetables. ? Eat a healthy amount of food. A 3-ounce serving of meat, for example, is about the size of a deck of cards. Fill the rest of your plate with vegetables and whole grains. ? Limit the amount of soda and sports drinks you have every day. Drink more water when you are thirsty. ? Eat at least 5 servings of fruits and vegetables every day. It may seem like a lot, but it is not hard to reach this goal. A serving or helping is 1 piece of fruit, 1 cup of vegetables, or 2 cups of leafy, raw vegetables. Have an apple or some carrot sticks as an afternoon snack instead of a candy bar. Try to have fruits and/or vegetables at every meal.  · Make exercise part of your daily routine. You may want to start with simple activities, such as walking, bicycling, or slow swimming. Try to be active 30 to 60 minutes every day. You do not need to do all 30 to 60 minutes all at once.  For example, you can exercise 3 times a day for 10 or 20 minutes. Moderate exercise is safe for most people, but it is always a good idea to talk to your doctor before starting an exercise program.  · Keep moving. Bree Lianneine the lawn, work in the garden, or [place]. Take the stairs instead of the elevator at work. · If you smoke, quit. People who smoke have an increased risk for heart attack, stroke, cancer, and other lung illnesses. Quitting is hard, but there are ways to boost your chance of quitting tobacco for good. ? Use nicotine gum, patches, or lozenges. ? Ask your doctor about stop-smoking programs and medicines. ? Keep trying. In addition to reducing your risk of diseases in the future, you will notice some benefits soon after you stop using tobacco. If you have shortness of breath or asthma symptoms, they will likely get better within a few weeks after you quit. · Limit how much alcohol you drink. Moderate amounts of alcohol (up to 2 drinks a day for men, 1 drink a day for women) are okay. But drinking too much can lead to liver problems, high blood pressure, and other health problems. Family health  If you have a family, there are many things you can do together to improve your health. · Eat meals together as a family as often as possible. · Eat healthy foods. This includes fruits, vegetables, lean meats and dairy, and whole grains. · Include your family in your fitness plan. Most people think of activities such as jogging or tennis as the way to fitness, but there are many ways you and your family can be more active. Anything that makes you breathe hard and gets your heart pumping is exercise. Here are some tips:  ? Walk to do errands or to take your child to school or the bus.  ? Go for a family bike ride after dinner instead of watching TV. Where can you learn more? Go to http://jonathon-margareth.info/. Enter Z029 in the search box to learn more about \"A Healthy Lifestyle: Care Instructions. \"  Current as of: September 11, 2018  Content Version: 11.9  © 2553-0943 [employer]. Care instructions adapted under license by [employer] (which disclaims liability or warranty for this information). If you have questions about a medical condition or this instruction, always ask your healthcare professional. [address] any warranty or liability for your use of this information.

## 2019-02-10 DIAGNOSIS — M54.50 CHRONIC MIDLINE LOW BACK PAIN WITHOUT SCIATICA: ICD-10-CM

## 2019-02-10 DIAGNOSIS — G89.29 CHRONIC MIDLINE LOW BACK PAIN WITHOUT SCIATICA: ICD-10-CM

## 2019-02-21 ENCOUNTER — OFFICE VISIT (OUTPATIENT)
Dept: OBGYN CLINIC | Age: 59
End: 2019-02-21

## 2019-02-21 DIAGNOSIS — M62.89 PELVIC FLOOR DYSFUNCTION: ICD-10-CM

## 2019-02-21 DIAGNOSIS — N95.0 PMB (POSTMENOPAUSAL BLEEDING): Primary | ICD-10-CM

## 2019-02-21 DIAGNOSIS — R93.89 ENDOMETRIAL THICKENING ON ULTRASOUND: ICD-10-CM

## 2019-02-21 DIAGNOSIS — N94.10 DYSPAREUNIA, FEMALE: ICD-10-CM

## 2019-02-21 NOTE — PROGRESS NOTES
Pt of Dr. Monster Jaime (she is in OR, unavailable). Seen last month for PMB. EMB:  Scant fragments of benign endometrial tissue with tubal metaplasia. Here today for SIS. Also c/o dyspareunia. PE: point tenderness over left pelvic floor. Uncomfortable with insertion of 2 fingers for bimanual exam.    A&P -   - PMB. SIS done today. No discrete polyp, but does have irregular thickening of wall. Defer management to Dr. Monster Jaime. - Dyspareunia with point tenderness of left pelvic floor. Could consider PT. Pt can discuss with Dr. Monster Jaime. DAGOBERTO [facility]  OFFICE PROCEDURE PROGRESS NOTE        Chart reviewed for the following:   Los Maguire LPN, have reviewed the History, Physical and updated the Allergic reactions for [address] performed immediately prior to start of procedure:   Los Maguire LPN, have performed the following reviews on [place] prior to the start of the procedure:            * Patient was identified by name and date of birth   * Agreement on procedure being performed was verified  * Risks and Benefits explained to the patient  * Procedure site verified and marked as necessary  * Patient was positioned for comfort  * Consent was signed and verified     Time: 8949      Date of procedure: 2019    Procedure performed by:  Km Ye MD    How tolerated by patient: tolerated the procedure well with no complications    Post Procedural Pain Scale: 2 - Hurts Little Bit    Comments: none    SONOHYSTEROGRAPHY    Ethan Coulter is a ,  62 y.o. female [facility] whose Patient's last menstrual period was 07/15/2011. was on . , presents for a sonohysterography. The indications for this procedure were reviewed with the patient. The procedure was explained in detail and all questions were answered. Procedure: The patient was placed in the lithotomy position.  A graves speculum was introduced into the vagina and the cervix was visualized. The cervix was prepped with Zephiran solution. A Cook's Hysterography catheter was then introduced into the uterine cavity and the speculum was removed. Sterile sonohysterography with 3D Reconstruction was performed. The endometrial cavity was distended with sterile saline.  ~3cc used with excellent distension achieved. The findings are as follows: irregular thickening of uterine lining, no discrete polyp seen. Small intramural fibroid in posterior wall. The patient tolerated the procedure well without complication, and was discharged to home.

## 2019-02-25 ENCOUNTER — HOSPITAL ENCOUNTER (OUTPATIENT)
Dept: LAB | Age: 59
Discharge: HOME | End: 2019-02-25
Payer: MEDICARE

## 2019-02-25 ENCOUNTER — OFFICE VISIT (OUTPATIENT)
Dept: FAMILY MEDICINE CLINIC | Age: 59
End: 2019-02-25

## 2019-02-25 VITALS
BODY MASS INDEX: 36.32 KG/M2 | DIASTOLIC BLOOD PRESSURE: 91 MMHG | WEIGHT: 218 LBS | HEIGHT: 65 IN | SYSTOLIC BLOOD PRESSURE: 153 MMHG | RESPIRATION RATE: 18 BRPM | OXYGEN SATURATION: 98 % | HEART RATE: 93 BPM | TEMPERATURE: 98.1 F

## 2019-02-25 DIAGNOSIS — R53.83 FATIGUE, UNSPECIFIED TYPE: Primary | ICD-10-CM

## 2019-02-25 DIAGNOSIS — J32.9 CHRONIC SINUSITIS, UNSPECIFIED LOCATION: ICD-10-CM

## 2019-02-25 DIAGNOSIS — E55.9 VITAMIN D DEFICIENCY: ICD-10-CM

## 2019-02-25 DIAGNOSIS — K91.2 POSTSURGICAL MALABSORPTION, NOT ELSEWHERE CLASSIFIED: ICD-10-CM

## 2019-02-25 DIAGNOSIS — M79.10 MYALGIA: ICD-10-CM

## 2019-02-25 PROCEDURE — 36415 COLL VENOUS BLD VENIPUNCTURE: CPT

## 2019-02-25 PROCEDURE — 82306 VITAMIN D 25 HYDROXY: CPT

## 2019-02-25 PROCEDURE — 84443 ASSAY THYROID STIM HORMONE: CPT

## 2019-02-25 PROCEDURE — 82607 VITAMIN B-12: CPT

## 2019-02-25 PROCEDURE — 80053 COMPREHEN METABOLIC PANEL: CPT

## 2019-02-25 PROCEDURE — 85025 COMPLETE CBC W/AUTO DIFF WBC: CPT

## 2019-02-25 RX ORDER — NAPROXEN 500 MG/1
500 TABLET ORAL 2 TIMES DAILY WITH MEALS
COMMUNITY
End: 2019-02-25 | Stop reason: SDUPTHER

## 2019-02-25 RX ORDER — CYCLOBENZAPRINE HCL 10 MG
10 TABLET ORAL
COMMUNITY
Start: 2019-02-21 | End: 2019-04-30 | Stop reason: ALTCHOICE

## 2019-02-25 RX ORDER — NAPROXEN 500 MG/1
500 TABLET ORAL 2 TIMES DAILY WITH MEALS
Qty: 60 TAB | Refills: 5 | Status: SHIPPED | OUTPATIENT
Start: 2019-02-25 | End: 2019-10-15

## 2019-02-25 RX ORDER — TIZANIDINE 2 MG/1
2 TABLET ORAL
COMMUNITY
Start: 2019-02-21 | End: 2019-06-20 | Stop reason: SDUPTHER

## 2019-02-25 NOTE — PROGRESS NOTES
Identified pt with two pt identifiers(name and ). Chief Complaint   Patient presents with    Sinus Infection    Ear Pain     bilateral        Health Maintenance Due   Topic    Shingrix Vaccine Age 50> (1 of 2)    COLONOSCOPY     MEDICARE YEARLY EXAM        Wt Readings from Last 3 Encounters:   19 218 lb (98.9 kg)   19 214 lb (97.1 kg)   19 214 lb (97.1 kg)     Temp Readings from Last 3 Encounters:   19 98.1 °F (36.7 °C) (Oral)   19 97.9 °F (36.6 °C) (Oral)   19 98.6 °F (37 °C) (Oral)     BP Readings from Last 3 Encounters:   19 (!) 153/91   19 128/80   19 127/89     Pulse Readings from Last 3 Encounters:   19 93   19 95   19 (!) 101         Learning Assessment:  :     Learning Assessment 2016 2015 2015 1/3/2014   PRIMARY LEARNER Patient Patient Patient Patient   HIGHEST LEVEL OF EDUCATION - PRIMARY LEARNER  - - - 4 YEARS OF COLLEGE   BARRIERS PRIMARY LEARNER - NONE - COGNITIVE   CO-LEARNER CAREGIVER - No - -   PRIMARY LANGUAGE ENGLISH ENGLISH ENGLISH ENGLISH   LEARNER PREFERENCE PRIMARY DEMONSTRATION DEMONSTRATION VIDEOS READING     - - - VIDEOS   ANSWERED BY patient  patient patient patient   RELATIONSHIP SELF SELF SELF SELF       Depression Screening:  :     3 most recent PHQ Screens 2018   Little interest or pleasure in doing things Nearly every day   Feeling down, depressed, irritable, or hopeless More than half the days   Total Score PHQ 2 5       Fall Risk Assessment:  :     Fall Risk Assessment, last 12 mths 2018   Able to walk? Yes   Fall in past 12 months? Yes   Fall with injury? Yes   Number of falls in past 12 months 5   Fall Risk Score 6       Abuse Screening:  :     Abuse Screening Questionnaire 2018 2015 3/10/2014   Do you ever feel afraid of your partner? N N N   Are you in a relationship with someone who physically or mentally threatens you? N N N   Is it safe for you to go home?  Phil Kaminski Coordination of Care Questionnaire:  :     1) Have you been to an emergency room, urgent care clinic since your last visit? no   Hospitalized since your last visit? no             2) Have you seen or consulted any other health care providers outside of [address] [place] [address] since your last visit? no  (Include any pap smears or colon screenings in this section.)    3) Do you have an Advance Directive on file? Yes, it is on file at home  Are you interested in receiving information about Advance Directives? no    Patient is accompanied by elderly friend. I have received verbal consent from Shaunna Mendiola to discuss any/all medical information while they are present in the room. Reviewed record in preparation for visit and have obtained necessary documentation. Medication reconciliation up to date and corrected with patient at this time.

## 2019-02-25 NOTE — PATIENT INSTRUCTIONS
Sinusitis: Care Instructions  Your Care Instructions    Sinusitis is an infection of the lining of the sinus cavities in your head. Sinusitis often follows a cold. It causes pain and pressure in your head and face. In most cases, sinusitis gets better on its own in 1 to 2 weeks. But some mild symptoms may last for several weeks. Sometimes antibiotics are needed. Follow-up care is a key part of your treatment and safety. Be sure to make and go to all appointments, and call your doctor if you are having problems. It's also a good idea to know your test results and keep a list of the medicines you take. How can you care for yourself at home? · Take an over-the-counter pain medicine, such as acetaminophen (Tylenol), ibuprofen (Advil, Motrin), or naproxen (Aleve). Read and follow all instructions on the label. · If the doctor prescribed antibiotics, take them as directed. Do not stop taking them just because you feel better. You need to take the full course of antibiotics. · Be careful when taking over-the-counter cold or flu medicines and Tylenol at the same time. Many of these medicines have acetaminophen, which is Tylenol. Read the labels to make sure that you are not taking more than the recommended dose. Too much acetaminophen (Tylenol) can be harmful. · Breathe warm, moist air from a steamy shower, a hot bath, or a sink filled with hot water. Avoid cold, dry air. Using a humidifier in your home may help. Follow the directions for cleaning the machine. · Use saline (saltwater) nasal washes to help keep your nasal passages open and wash out mucus and bacteria. You can buy saline nose drops at a grocery store or drugstore. Or you can make your own at home by adding 1 teaspoon of salt and 1 teaspoon of baking soda to 2 cups of distilled water. If you make your own, fill a bulb syringe with the solution, insert the tip into your nostril, and squeeze gently. Dev Courts your nose.   · Put a hot, wet towel or a warm gel pack on your face 3 or 4 times a day for 5 to 10 minutes each time. · Try a decongestant nasal spray like oxymetazoline (Afrin). Do not use it for more than 3 days in a row. Using it for more than 3 days can make your congestion worse. When should you call for help? Call your doctor now or seek immediate medical care if:    · You have new or worse swelling or redness in your face or around your eyes.     · You have a new or higher fever.    Watch closely for changes in your health, and be sure to contact your doctor if:    · You have new or worse facial pain.     · The mucus from your nose becomes thicker (like pus) or has new blood in it.     · You are not getting better as expected. Where can you learn more? Go to http://jonathon-margareth.info/. Enter M832 in the search box to learn more about \"Sinusitis: Care Instructions. \"  Current as of: March 27, 2018  Content Version: 11.9  © 5812-1612 [employer]. Care instructions adapted under license by [employer] (which disclaims liability or warranty for this information). If you have questions about a medical condition or this instruction, always ask your healthcare professional. [place] [address] any warranty or liability for your use of this information.
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
## 2019-02-26 LAB
25(OH)D3+25(OH)D2 SERPL-MCNC: 32.4 NG/ML (ref 30–100)
ALBUMIN SERPL-MCNC: 3.9 G/DL (ref 3.5–5.5)
ALBUMIN/GLOB SERPL: 1.9 {RATIO} (ref 1.2–2.2)
ALP SERPL-CCNC: 178 IU/L (ref 39–117)
ALT SERPL-CCNC: 71 IU/L (ref 0–32)
AST SERPL-CCNC: 107 IU/L (ref 0–40)
BASOPHILS # BLD AUTO: 0 X10E3/UL (ref 0–0.2)
BASOPHILS NFR BLD AUTO: 0 %
BILIRUB SERPL-MCNC: 0.4 MG/DL (ref 0–1.2)
BUN SERPL-MCNC: 24 MG/DL (ref 6–24)
BUN/CREAT SERPL: 27 (ref 9–23)
CALCIUM SERPL-MCNC: 8.6 MG/DL (ref 8.7–10.2)
CHLORIDE SERPL-SCNC: 106 MMOL/L (ref 96–106)
CO2 SERPL-SCNC: 25 MMOL/L (ref 20–29)
CREAT SERPL-MCNC: 0.9 MG/DL (ref 0.57–1)
EOSINOPHIL # BLD AUTO: 0 X10E3/UL (ref 0–0.4)
EOSINOPHIL NFR BLD AUTO: 0 %
ERYTHROCYTE [DISTWIDTH] IN BLOOD BY AUTOMATED COUNT: 14.1 % (ref 12.3–15.4)
GLOBULIN SER CALC-MCNC: 2.1 G/DL (ref 1.5–4.5)
GLUCOSE SERPL-MCNC: 82 MG/DL (ref 65–99)
HCT VFR BLD AUTO: 39.3 % (ref 34–46.6)
HGB BLD-MCNC: 13.1 G/DL (ref 11.1–15.9)
IMM GRANULOCYTES # BLD AUTO: 0 X10E3/UL (ref 0–0.1)
IMM GRANULOCYTES NFR BLD AUTO: 0 %
LYMPHOCYTES # BLD AUTO: 1.2 X10E3/UL (ref 0.7–3.1)
LYMPHOCYTES NFR BLD AUTO: 29 %
MCH RBC QN AUTO: 29.9 PG (ref 26.6–33)
MCHC RBC AUTO-ENTMCNC: 33.3 G/DL (ref 31.5–35.7)
MCV RBC AUTO: 90 FL (ref 79–97)
MONOCYTES # BLD AUTO: 0.6 X10E3/UL (ref 0.1–0.9)
MONOCYTES NFR BLD AUTO: 14 %
NEUTROPHILS # BLD AUTO: 2.4 X10E3/UL (ref 1.4–7)
NEUTROPHILS NFR BLD AUTO: 57 %
PLATELET # BLD AUTO: 181 X10E3/UL (ref 150–379)
POTASSIUM SERPL-SCNC: 5.1 MMOL/L (ref 3.5–5.2)
PROT SERPL-MCNC: 6 G/DL (ref 6–8.5)
RBC # BLD AUTO: 4.38 X10E6/UL (ref 3.77–5.28)
SODIUM SERPL-SCNC: 143 MMOL/L (ref 134–144)
T4 FREE SERPL-MCNC: 1.04 NG/DL (ref 0.82–1.77)
TSH SERPL DL<=0.005 MIU/L-ACNC: 3.19 UIU/ML (ref 0.45–4.5)
VIT B12 SERPL-MCNC: 389 PG/ML (ref 232–1245)
WBC # BLD AUTO: 4.2 X10E3/UL (ref 3.4–10.8)

## 2019-02-26 NOTE — TELEPHONE ENCOUNTER
----- Message from Dawood Bingham sent at 2/25/2019  5:31 PM EST -----  Regarding: Dr. Desiree Gramajo: [phone]  Pt requesting a response or a call back concerning Rx ibuprofen 800 mg pharmacy request.

## 2019-02-28 ENCOUNTER — TELEPHONE (OUTPATIENT)
Dept: OBGYN CLINIC | Age: 59
End: 2019-02-28

## 2019-02-28 DIAGNOSIS — N94.10 DYSPAREUNIA IN FEMALE: Primary | ICD-10-CM

## 2019-02-28 NOTE — TELEPHONE ENCOUNTER
Spoke with patient    Will refer to PT for pelvic pain    Disc hyst/D&C with myosure for thickening and hx of  bleeding    Pt counseled concerning risks of surgery. Risks disc include bleeding, transfusion, infection, readmission, abscess drainage, injury to abdominal organs including bowel, bladder, ureters, need for additional surgery, injury may not be recognized at time of surgery, and risk of death.       Will have alicia call patient to schedule at her convenience

## 2019-02-28 NOTE — TELEPHONE ENCOUNTER
Call received at 837am    62year old patient last seen in the office on 2/21/19. Patient had SIS done by Dr. Sophy Krishna. PE: point tenderness over left pelvic floor. Uncomfortable with insertion of 2 fingers for bimanual exam.     A&P -   - PMB. SIS done today. No discrete polyp, but does have irregular thickening of wall. Defer management to Dr. Joan Paez. - Dyspareunia with point tenderness of left pelvic floor. Could consider PT. Pt can discuss with Dr. Joan Paez. Please review and advise of next steps    ?  Ov     Please advise

## 2019-03-04 RX ORDER — IBUPROFEN 600 MG/1
TABLET ORAL
Qty: 90 TAB | Refills: 1 | Status: SHIPPED | OUTPATIENT
Start: 2019-03-04 | End: 2019-04-30 | Stop reason: DRUGHIGH

## 2019-03-06 DIAGNOSIS — N95.0 POSTMENOPAUSAL BLEEDING: Primary | ICD-10-CM

## 2019-03-12 ENCOUNTER — OFFICE VISIT (OUTPATIENT)
Dept: FAMILY MEDICINE CLINIC | Age: 59
End: 2019-03-12

## 2019-03-12 ENCOUNTER — APPOINTMENT (OUTPATIENT)
Dept: PHYSICAL THERAPY | Age: 59
End: 2019-03-12

## 2019-03-12 VITALS
WEIGHT: 216 LBS | BODY MASS INDEX: 35.99 KG/M2 | DIASTOLIC BLOOD PRESSURE: 82 MMHG | OXYGEN SATURATION: 98 % | TEMPERATURE: 98.5 F | RESPIRATION RATE: 18 BRPM | HEIGHT: 65 IN | SYSTOLIC BLOOD PRESSURE: 120 MMHG | HEART RATE: 54 BPM

## 2019-03-12 DIAGNOSIS — F33.2 SEVERE RECURRENT MAJOR DEPRESSION WITHOUT PSYCHOTIC FEATURES (HCC): Primary | ICD-10-CM

## 2019-03-12 DIAGNOSIS — M54.50 CHRONIC BILATERAL LOW BACK PAIN WITHOUT SCIATICA: ICD-10-CM

## 2019-03-12 DIAGNOSIS — J30.9 ALLERGIC RHINITIS, UNSPECIFIED SEASONALITY, UNSPECIFIED TRIGGER: ICD-10-CM

## 2019-03-12 DIAGNOSIS — R10.2 PELVIC PAIN: ICD-10-CM

## 2019-03-12 DIAGNOSIS — H65.01 RIGHT ACUTE SEROUS OTITIS MEDIA, RECURRENCE NOT SPECIFIED: ICD-10-CM

## 2019-03-12 DIAGNOSIS — G89.29 CHRONIC BILATERAL LOW BACK PAIN WITHOUT SCIATICA: ICD-10-CM

## 2019-03-12 RX ORDER — AMOXICILLIN AND CLAVULANATE POTASSIUM 875; 125 MG/1; MG/1
1 TABLET, FILM COATED ORAL EVERY 12 HOURS
Qty: 20 TAB | Refills: 0 | Status: SHIPPED | OUTPATIENT
Start: 2019-03-12 | End: 2019-03-22

## 2019-03-12 RX ORDER — MONTELUKAST SODIUM 10 MG/1
10 TABLET ORAL DAILY
Qty: 90 TAB | Refills: 1 | Status: SHIPPED | OUTPATIENT
Start: 2019-03-12 | End: 2019-10-15 | Stop reason: SDUPTHER

## 2019-03-12 RX ORDER — FLUOXETINE HYDROCHLORIDE 40 MG/1
40 CAPSULE ORAL DAILY
Qty: 90 CAP | Refills: 1 | Status: SHIPPED | OUTPATIENT
Start: 2019-03-12 | End: 2019-10-15 | Stop reason: SDUPTHER
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
## 2019-03-12 NOTE — PROGRESS NOTES
Identified pt with two pt identifiers(name and ). Chief Complaint   Patient presents with   [facility]     needs a copy of last ones. And would like to have a hormone panel done for Dr. Cici Aguila before surgery    Blood Pressure Check    Ear Pain     Both have pain. Right is worse. Health Maintenance Due   Topic    Shingrix Vaccine Age 50> (1 of 2)    COLONOSCOPY     MEDICARE YEARLY EXAM        Wt Readings from Last 3 Encounters:   19 216 lb (98 kg)   19 218 lb (98.9 kg)   19 214 lb (97.1 kg)     Temp Readings from Last 3 Encounters:   19 98.5 °F (36.9 °C) (Oral)   19 98.1 °F (36.7 °C) (Oral)   19 97.9 °F (36.6 °C) (Oral)     BP Readings from Last 3 Encounters:   19 120/82   19 (!) 153/91   19 128/80     Pulse Readings from Last 3 Encounters:   19 (!) 54   19 93   19 95         Learning Assessment:  :     Learning Assessment 2016 2015 2015 1/3/2014   PRIMARY LEARNER Patient Patient Patient Patient   HIGHEST LEVEL OF EDUCATION - PRIMARY LEARNER  - - - 4 YEARS OF COLLEGE   BARRIERS PRIMARY LEARNER - NONE - COGNITIVE   CO-LEARNER CAREGIVER - No - -   PRIMARY LANGUAGE ENGLISH ENGLISH ENGLISH ENGLISH   LEARNER PREFERENCE PRIMARY DEMONSTRATION DEMONSTRATION VIDEOS READING     - - - VIDEOS   ANSWERED BY patient  patient patient patient   RELATIONSHIP SELF SELF SELF SELF       Depression Screening:  :     3 most recent PHQ Screens 2018   Little interest or pleasure in doing things Nearly every day   Feeling down, depressed, irritable, or hopeless More than half the days   Total Score PHQ 2 5       Fall Risk Assessment:  :     Fall Risk Assessment, last 12 mths 2018   Able to walk? Yes   Fall in past 12 months? Yes   Fall with injury?  Yes   Number of falls in past 12 months 5   Fall Risk Score 6       Abuse Screening:  :     Abuse Screening Questionnaire 2018 2015 3/10/2014   Do you ever feel afraid of your partner? N N N   Are you in a relationship with someone who physically or mentally threatens you? N N N   Is it safe for you to go home? Y Y Y       Coordination of Care Questionnaire:  :     1) Have you been to an emergency room, urgent care clinic since your last visit? no   Hospitalized since your last visit? no             2) Have you seen or consulted any other health care providers outside of [address] [place] [address] since your last visit? no  (Include any pap smears or colon screenings in this section.)    3) Do you have an Advance Directive on file? no  Are you interested in receiving information about Advance Directives? no    Reviewed record in preparation for visit and have obtained necessary documentation. Medication reconciliation up to date and corrected with patient at this time.

## 2019-03-12 NOTE — H&P
62 y.o. female for open access colonoscopy for screening   Additional data for completion of the targeted pre-endoscopy H&P will be provided under 'H&P interval notes'. Please see that document which will be attached to this.   David Aguilar MD

## 2019-03-12 NOTE — PATIENT INSTRUCTIONS
A Healthy Lifestyle: Care Instructions  Your Care Instructions    A healthy lifestyle can help you feel good, stay at a healthy weight, and have plenty of energy for both work and play. A healthy lifestyle is something you can share with your whole family. A healthy lifestyle also can lower your risk for serious health problems, such as high blood pressure, heart disease, and diabetes. You can follow a few steps listed below to improve your health and the health of your family. Follow-up care is a key part of your treatment and safety. Be sure to make and go to all appointments, and call your doctor if you are having problems. It's also a good idea to know your test results and keep a list of the medicines you take. How can you care for yourself at home? · Do not eat too much sugar, fat, or fast foods. You can still have dessert and treats now and then. The goal is moderation. · Start small to improve your eating habits. Pay attention to portion sizes, drink less juice and soda pop, and eat more fruits and vegetables. ? Eat a healthy amount of food. A 3-ounce serving of meat, for example, is about the size of a deck of cards. Fill the rest of your plate with vegetables and whole grains. ? Limit the amount of soda and sports drinks you have every day. Drink more water when you are thirsty. ? Eat at least 5 servings of fruits and vegetables every day. It may seem like a lot, but it is not hard to reach this goal. A serving or helping is 1 piece of fruit, 1 cup of vegetables, or 2 cups of leafy, raw vegetables. Have an apple or some carrot sticks as an afternoon snack instead of a candy bar. Try to have fruits and/or vegetables at every meal.  · Make exercise part of your daily routine. You may want to start with simple activities, such as walking, bicycling, or slow swimming. Try to be active 30 to 60 minutes every day. You do not need to do all 30 to 60 minutes all at once.  For example, you can exercise 3 times a day for 10 or 20 minutes. Moderate exercise is safe for most people, but it is always a good idea to talk to your doctor before starting an exercise program.  · Keep moving. Kayla Moss the lawn, work in the garden, or [place]. Take the stairs instead of the elevator at work. · If you smoke, quit. People who smoke have an increased risk for heart attack, stroke, cancer, and other lung illnesses. Quitting is hard, but there are ways to boost your chance of quitting tobacco for good. ? Use nicotine gum, patches, or lozenges. ? Ask your doctor about stop-smoking programs and medicines. ? Keep trying. In addition to reducing your risk of diseases in the future, you will notice some benefits soon after you stop using tobacco. If you have shortness of breath or asthma symptoms, they will likely get better within a few weeks after you quit. · Limit how much alcohol you drink. Moderate amounts of alcohol (up to 2 drinks a day for men, 1 drink a day for women) are okay. But drinking too much can lead to liver problems, high blood pressure, and other health problems. Family health  If you have a family, there are many things you can do together to improve your health. · Eat meals together as a family as often as possible. · Eat healthy foods. This includes fruits, vegetables, lean meats and dairy, and whole grains. · Include your family in your fitness plan. Most people think of activities such as jogging or tennis as the way to fitness, but there are many ways you and your family can be more active. Anything that makes you breathe hard and gets your heart pumping is exercise. Here are some tips:  ? Walk to do errands or to take your child to school or the bus.  ? Go for a family bike ride after dinner instead of watching TV. Where can you learn more? Go to http://jonathon-margareth.info/. Enter X899 in the search box to learn more about \"A Healthy Lifestyle: Care Instructions. \"  Current as of: September 11, 2018  Content Version: 11.9  © 4182-0962 [employer]. Care instructions adapted under license by [employer] (which disclaims liability or warranty for this information). If you have questions about a medical condition or this instruction, always ask your healthcare professional. [address] any warranty or liability for your use of this information.

## 2019-03-12 NOTE — LETTER
3/12/2019 3:38 PM 
 
Ms. Steven [facility] [address] [phone] Dear Bessie Bars: Please find your most recent results below. Resulted Orders CBC WITH AUTOMATED DIFF Result Value Ref Range WBC 4.2 3.4 - 10.8 x10E3/uL  
 RBC 4.38 3.77 - 5.28 x10E6/uL HGB 13.1 11.1 - 15.9 g/dL HCT 39.3 34.0 - 46.6 % MCV 90 79 - 97 fL  
 MCH 29.9 26.6 - 33.0 pg  
 MCHC 33.3 31.5 - 35.7 g/dL  
 RDW 14.1 12.3 - 15.4 % PLATELET 521 841 - 937 x10E3/uL NEUTROPHILS 57 Not Estab. % Lymphocytes 29 Not Estab. % MONOCYTES 14 Not Estab. % EOSINOPHILS 0 Not Estab. % BASOPHILS 0 Not Estab. %  
 ABS. NEUTROPHILS 2.4 1.4 - 7.0 x10E3/uL Abs Lymphocytes 1.2 0.7 - 3.1 x10E3/uL  
 ABS. MONOCYTES 0.6 0.1 - 0.9 x10E3/uL  
 ABS. EOSINOPHILS 0.0 0.0 - 0.4 x10E3/uL  
 ABS. BASOPHILS 0.0 0.0 - 0.2 x10E3/uL IMMATURE GRANULOCYTES 0 Not Estab. %  
 ABS. IMM. GRANS. 0.0 0.0 - 0.1 x10E3/uL Narrative Performed at:  [address]  [id number] : Roberto Gray MD, Phone:  [phone] METABOLIC PANEL, COMPREHENSIVE Result Value Ref Range Glucose 82 65 - 99 mg/dL BUN 24 6 - 24 mg/dL Creatinine 0.90 0.57 - 1.00 mg/dL GFR est non-AA 71 >59 mL/min/1.73 GFR est AA 82 >59 mL/min/1.73  
 BUN/Creatinine ratio 27 (H) 9 - 23 Sodium 143 134 - 144 mmol/L Potassium 5.1 3.5 - 5.2 mmol/L Chloride 106 96 - 106 mmol/L  
 CO2 25 20 - 29 mmol/L Calcium 8.6 (L) 8.7 - 10.2 mg/dL Protein, total 6.0 6.0 - 8.5 g/dL Albumin 3.9 3.5 - 5.5 g/dL GLOBULIN, TOTAL 2.1 1.5 - 4.5 g/dL A-G Ratio 1.9 1.2 - 2.2 Bilirubin, total 0.4 0.0 - 1.2 mg/dL Alk. phosphatase 178 (H) 39 - 117 IU/L  
 AST (SGOT) 107 (H) 0 - 40 IU/L  
 ALT (SGPT) 71 (H) 0 - 32 IU/L Narrative Performed at:  [address]  [id number] : Roberto Gray MD, Phone:  [phone] TSH AND FREE T4 Result Value Ref Range TSH 3.190 0.450 - 4.500 uIU/mL T4, Free 1.04 0.82 - 1.77 ng/dL Narrative Performed at:  [address]  [id number] : Derrell Alberts MD, Phone:  [phone] VITAMIN B12 Result Value Ref Range Vitamin B12 389 232 - 1,245 pg/mL Narrative Performed at:  [address]  [id number] : Derrell Alberts MD, Phone:  [phone] VITAMIN D, 25 HYDROXY Result Value Ref Range VITAMIN D, 25-HYDROXY 32.4 30.0 - 100.0 ng/mL Comment:  
   Vitamin D deficiency has been defined by the [address] [place] [address] practice guideline as a 
level of serum 25-OH vitamin D less than 20 ng/mL (1,2). The Endocrine Society went on to further define vitamin D 
insufficiency as a level between 21 and 29 ng/mL (2). 1. IOM ([place] of [employer]). 2010. Dietary reference 
   intakes for calcium and D. [address] [place] [address]: The 
   [employer]. 2. Rich MF, Yeni NC, Marquise RICHTER, et al. 
   Evaluation, treatment, and prevention of vitamin D 
   deficiency: an Endocrine Society clinical practice 
   guideline. JCEM. 2011 Jul; 96(7):1911-30. Narrative Performed at:  [address]  [id number] : Derrell Alberts MD, Phone:  [phone] RECOMMENDATIONS: 
Follow up with Gastroenterology concerning elevated liver function testing. All other labs are normal.  
 
Please call me if you have any questions: [phone] Sincerely, Nik Calero MD

## 2019-03-13 NOTE — PERIOP NOTES
[address]  Endoscopy Preprocedure Instructions      1. On the day of your surgery, please report to registration located on the 2nd floor of the  [facility]. yes    2. You must have a responsible adult to drive you to the hospital, stay at the hospital during your procedure and drive you home. If they leave your procedure will not be started (no exceptions). yes    3. Do not have anything to eat or drink (including water, gum, mints, coffee, and juice) after midnight. This does not apply to the medications you were instructed to take by your physician. yesIf you are currently taking Plavix, Coumadin, Aspirin, or other blood-thinning agents, contact your physician for special instructions. yes,    4. If you are having a procedure that requires bowel prep: We recommend that you have only clear liquids the day before your procedure and begin your bowel prep by 5:00 pm.  You may continue to drink clear liquids until midnight. If for any reason you are not able to complete your prep please notify your physician immediately. yes    5. Have a list of all current medications, including vitamins, herbal supplements and any other over the counter medications. yes    6. If you wear glasses, contacts, dentures and/or hearing aids, they may be removed prior to procedure, please bring a case to store them in. yes    7. You should understand that if you do not follow these instructions your procedure may be cancelled. If your physical condition changes (I.e. fever, cold or flu) please contact your doctor as soon as possible. 8. It is important that you be on time.   If for any reason you are unable to keep your appointment please call [phone] the day of or your physicians office prior to your scheduled procedure

## 2019-03-15 ENCOUNTER — HOSPITAL ENCOUNTER (OUTPATIENT)
Age: 59
Setting detail: OUTPATIENT SURGERY
Discharge: HOME | End: 2019-03-15
Attending: SPECIALIST | Admitting: SPECIALIST
Payer: MEDICARE

## 2019-03-15 ENCOUNTER — ANESTHESIA EVENT (OUTPATIENT)
Dept: ENDOSCOPY | Age: 59
End: 2019-03-15
Payer: MEDICARE

## 2019-03-15 ENCOUNTER — ANESTHESIA (OUTPATIENT)
Dept: ENDOSCOPY | Age: 59
End: 2019-03-15
Payer: MEDICARE

## 2019-03-15 VITALS
DIASTOLIC BLOOD PRESSURE: 71 MMHG | OXYGEN SATURATION: 100 % | HEART RATE: 61 BPM | HEIGHT: 65 IN | SYSTOLIC BLOOD PRESSURE: 129 MMHG | TEMPERATURE: 98 F | WEIGHT: 218 LBS | BODY MASS INDEX: 36.32 KG/M2 | RESPIRATION RATE: 19 BRPM

## 2019-03-15 PROCEDURE — 74011250636 HC RX REV CODE- 250/636: Performed by: PHYSICIAN ASSISTANT

## 2019-03-15 PROCEDURE — 76060000031 HC ANESTHESIA FIRST 0.5 HR: Performed by: SPECIALIST

## 2019-03-15 PROCEDURE — 74011250636 HC RX REV CODE- 250/636

## 2019-03-15 PROCEDURE — 76040000019: Performed by: SPECIALIST

## 2019-03-15 RX ORDER — NALOXONE HYDROCHLORIDE 0.4 MG/ML
0.4 INJECTION, SOLUTION INTRAMUSCULAR; INTRAVENOUS; SUBCUTANEOUS
Status: DISCONTINUED | OUTPATIENT
Start: 2019-03-15 | End: 2019-03-15 | Stop reason: HOSPADM

## 2019-03-15 RX ORDER — FLUMAZENIL 0.1 MG/ML
0.2 INJECTION INTRAVENOUS
Status: DISCONTINUED | OUTPATIENT
Start: 2019-03-15 | End: 2019-03-15 | Stop reason: HOSPADM

## 2019-03-15 RX ORDER — PROPOFOL 10 MG/ML
INJECTION, EMULSION INTRAVENOUS
Status: DISCONTINUED | OUTPATIENT
Start: 2019-03-15 | End: 2019-03-15 | Stop reason: HOSPADM

## 2019-03-15 RX ORDER — SODIUM CHLORIDE 9 MG/ML
50 INJECTION, SOLUTION INTRAVENOUS CONTINUOUS
Status: DISCONTINUED | OUTPATIENT
Start: 2019-03-15 | End: 2019-03-15 | Stop reason: HOSPADM

## 2019-03-15 RX ORDER — FENTANYL CITRATE 50 UG/ML
25 INJECTION, SOLUTION INTRAMUSCULAR; INTRAVENOUS AS NEEDED
Status: DISCONTINUED | OUTPATIENT
Start: 2019-03-15 | End: 2019-03-15 | Stop reason: HOSPADM

## 2019-03-15 RX ORDER — ATROPINE SULFATE 0.1 MG/ML
0.5 INJECTION INTRAVENOUS
Status: DISCONTINUED | OUTPATIENT
Start: 2019-03-15 | End: 2019-03-15 | Stop reason: HOSPADM

## 2019-03-15 RX ORDER — PROPOFOL 10 MG/ML
INJECTION, EMULSION INTRAVENOUS AS NEEDED
Status: DISCONTINUED | OUTPATIENT
Start: 2019-03-15 | End: 2019-03-15 | Stop reason: HOSPADM

## 2019-03-15 RX ORDER — EPINEPHRINE 0.1 MG/ML
1 INJECTION INTRACARDIAC; INTRAVENOUS
Status: DISCONTINUED | OUTPATIENT
Start: 2019-03-15 | End: 2019-03-15 | Stop reason: HOSPADM

## 2019-03-15 RX ORDER — SODIUM CHLORIDE 9 MG/ML
INJECTION, SOLUTION INTRAVENOUS
Status: DISCONTINUED | OUTPATIENT
Start: 2019-03-15 | End: 2019-03-15 | Stop reason: HOSPADM

## 2019-03-15 RX ORDER — MIDAZOLAM HYDROCHLORIDE 1 MG/ML
.25-5 INJECTION, SOLUTION INTRAMUSCULAR; INTRAVENOUS AS NEEDED
Status: DISCONTINUED | OUTPATIENT
Start: 2019-03-15 | End: 2019-03-15 | Stop reason: HOSPADM

## 2019-03-15 RX ORDER — DEXTROMETHORPHAN/PSEUDOEPHED 2.5-7.5/.8
1.2 DROPS ORAL
Status: DISCONTINUED | OUTPATIENT
Start: 2019-03-15 | End: 2019-03-15 | Stop reason: HOSPADM

## 2019-03-15 RX ADMIN — PROPOFOL 20 MG: 10 INJECTION, EMULSION INTRAVENOUS at 07:20

## 2019-03-15 RX ADMIN — SODIUM CHLORIDE: 9 INJECTION, SOLUTION INTRAVENOUS at 06:55

## 2019-03-15 RX ADMIN — PROPOFOL 80 MG: 10 INJECTION, EMULSION INTRAVENOUS at 07:19

## 2019-03-15 RX ADMIN — PROPOFOL 100 MCG/KG/MIN: 10 INJECTION, EMULSION INTRAVENOUS at 07:19

## 2019-03-15 RX ADMIN — SODIUM CHLORIDE 50 ML/HR: 900 INJECTION, SOLUTION INTRAVENOUS at 07:05

## 2019-03-15 NOTE — ANESTHESIA POSTPROCEDURE EVALUATION
Procedure(s):  COLONOSCOPY-no info to . Anesthesia Post Evaluation      Multimodal analgesia: multimodal analgesia not used between 6 hours prior to anesthesia start to PACU discharge  Patient location during evaluation: PACU  Patient participation: complete - patient participated  Level of consciousness: awake and alert  Pain score: 0  Airway patency: patent  Anesthetic complications: no  Cardiovascular status: acceptable  Respiratory status: acceptable  Hydration status: acceptable  Post anesthesia nausea and vomiting:  none      Visit Vitals  /71   Pulse 61   Temp 36.7 °C (98 °F)   Resp 19   Ht 5' 5\" (1.651 m)   Wt 98.9 kg (218 lb)   SpO2 100%   Breastfeeding?  No   BMI 36.28 kg/m²

## 2019-03-15 NOTE — PERIOP NOTES
Procedure being performed under MAC; Helder Fraser CRNA at bedside monitoring patient at 2229. See anesthesia notes. Endoscope was pre-cleaned at bedside immediately following procedure by Essence Noel GI Tech. at 0730. Care of patient assumed from the anesthesia provider at 9218. Patient tolerated procedure well. Abdomen remains soft and non tender post procedure, no complaints or indication of discomfort noted at this time. See anesthesia note. Patient transferred to Endoscopy Recovery and report given to recovery nurse Bernardo Sykes recovery room RAMANA.

## 2019-03-15 NOTE — DISCHARGE INSTRUCTIONS
[address] [facility] KARYN Brandon MD  [phone]      March 15, 2019    Khris Stevens  YOB: 1960    COLONOSCOPY DISCHARGE INSTRUCTIONS    If there is redness at IV site you should apply warm compress to area. If redness or soreness persist contact Dr. Shaila Brandon' or your primary care doctor. There may be a slight amount of blood passed from the rectum. Gaseous discomfort may develop, but walking, belching will help relieve this. You may not operate a vehicle for 12 hours  You may not operate machinery or dangerous appliances for rest of today  You may not drink alcoholic beverages for 12 hours  Avoid making any critical decisions for 24 hours    DIET:  You may resume your normal diet, but some patients find that heavy or large meals may lead to indigestion or vomiting. I suggest a light meal as first food intake. MEDICATIONS:  The use of some over-the-counter pain medication may lead to bleeding after colon biopsies or polyp removal.  Tylenol (also called acetaminophen) is safe to take even if you have had colonoscopy with polyp removal.  Based on the procedure you had today you may safely take aspirin or aspirin-like products for the next ten (10) days. Remember that Tylenol (also called acetaminophen) is safe to take after colonoscopy even if you have had biopsies or polyps removed. ACTIVITY:  You may resume your normal household activities, but it is recommended that you spend the remainder of the day resting -  avoid any strenuous activity. CALL DR. Tony Arce' OFFICE IF:  Increasing pain, nausea, vomiting  Abdominal distension (swelling)  Significant new or increased bleeding (oral or rectal)  Fever/Chills  Chest pain/shortness of breath                       Additional instructions: We looked at the colon the best we could but the colon was still quite dirty and so we couldn't see very well.   I recommend that we repeat the procedure with more prep. My office will contact you to arrange. It was an honor to be your doctor today. Please let me or my office staff know if you have any feedback about today's procedure. Emilia Pang MD    Colonoscopy saves lives, and can prevent colon cancer. Everyone aged 48 or older needs colonoscopy.   Tell your family and friends: get the test!

## 2019-03-15 NOTE — PROCEDURES
[address] [facility] KARYN García MD  [phone]      March 15, 2019    Colonoscopy Procedure Note  Bang Brar  :  1960  [place] Medical Record Number: [id number]    Indications:     Screening colonoscopy  PCP:  Jaymie Whitten MD  Anesthesia/Sedation: Conscious Sedation/Moderate Sedation/GETA, see notes  Endoscopist:  Dr. Marcia Torres  Complications:  None  Estimated Blood Loss:  None    Permit:  The indications, risks, benefits and alternatives were reviewed with the patient or their decision maker who was provided an opportunity to ask questions and all questions were answered. The specific risks of colonoscopy with conscious sedation were reviewed, including but not limited to anesthetic complication, bleeding, adverse drug reaction, missed lesion, infection, IV site reactions, and intestinal perforation which would lead to the need for surgical repair. Alternatives to colonoscopy including radiographic imaging, observation without testing, or laboratory testing were reviewed including the limitations of those alternatives. After considering the options and having all their questions answered, the patient or their decision maker provided both verbal and written consent to proceed. Procedure in Detail:  After obtaining informed consent, positioning of the patient in the left lateral decubitus position, and conduction of a pre-procedure pause or \"time out\" the endoscope was introduced into the anus and advanced to the cecum, which was identified by the ileocecal valve and appendiceal orifice. The quality of the colonic preparation was poor. A careful inspection was made as the colonoscope was withdrawn, findings and interventions are described below. Findings:   No mass lesions seen but quality of preparation especially in the right colon is very poor and lesions may have been obscured. She will need to repeat with better prep sometime in the next 12 months. Specimens:    none    Complications:   None; patient tolerated the procedure well. Impression:  See post-procedure diagnoses    Recommendations:      - repeat colonoscopy with 2 day prep    Thank you for entrusting me with this patient's care. Please do not hesitate to contact me with any questions or if I can be of assistance with any of your other patients' GI needs. Signed By: Kelsie Gurrola MD                        March 15, 2019      Surgical assistant none. Implants none unless specified.

## 2019-03-15 NOTE — ROUTINE PROCESS
Godfrey Cortes  1960  [id number]    Situation:  Verbal report received from: Juan Diego Farhan  Procedure: Procedure(s):  COLONOSCOPY-no info to     Background:    Preoperative diagnosis: SCREENING COLONOSCOPY  Postoperative diagnosis: * No post-op diagnosis entered *    :  Dr. Lorraine Main  Assistant(s): Endoscopy Technician-1: Quinn MORALES  Endoscopy RN-1: Rich Coates RN    Specimens: * No specimens in log *  H. Pylori  no    Assessment:  Intra-procedure medications     Anesthesia gave intra-procedure sedation and medications, see anesthesia flow sheet yes    Intravenous fluids: NS@ KVO     Vital signs stable     Abdominal assessment: round and soft     Recommendation:  Discharge patient per MD order.   Return to floor  Family or Friend  Permission to share finding with family or friend no

## 2019-03-15 NOTE — ANESTHESIA PREPROCEDURE EVALUATION
Anesthetic History     PONV          Review of Systems / Medical History  Patient summary reviewed, nursing notes reviewed and pertinent labs reviewed    Pulmonary  Within defined limits                 Neuro/Psych         TIA and psychiatric history  Pertinent negatives: No CVA  Comments: Anxiety / depression Cardiovascular              CAD    Exercise tolerance: >4 METS  Comments: Claims to have had a heart attack in the 1990s. Catheterized. No medication. GI/Hepatic/Renal     GERD: well controlled           Endo/Other        Obesity  Pertinent negatives: No morbid obesity   Other Findings   Comments: Gastric bypass surgery. Lower quadrant abdo pain. Backache.            Physical Exam    Airway  Mallampati: II    Neck ROM: normal range of motion   Mouth opening: Normal     Cardiovascular    Rhythm: regular  Rate: normal         Dental    Dentition: Full lower dentures and Full upper dentures     Pulmonary  Breath sounds clear to auscultation               Abdominal  GI exam deferred       Other Findings            Anesthetic Plan    ASA: 3  Anesthesia type: MAC          Induction: Intravenous  Anesthetic plan and risks discussed with: Patient

## 2019-03-15 NOTE — INTERVAL H&P NOTE
Pre-Endoscopy H&P Update  Chief complaint/HPI/ROS:  The indication for the procedure, the patient's history and the patient's current medications are reviewed prior to the procedure and that data is reported on the H&P to which this document is attached. Any significant complaints with regard to organ systems will be noted, and if not mentioned then a review of systems is not contributory.   Past Medical History:   Diagnosis Date    Anemia     Burn     Shoulder, back and buttocks     CAD (coronary artery disease)     Chronic pain     Followed by Dr. Esme Avendaño Dysthymic disorder 2012    PTSD, memory loss short term    Fibromyalgia     GERD (gastroesophageal reflux disease)     rarely    Herniated cervical disc     Ill-defined condition     lymphedema in legs    Kidney stone     Lymphedema     Lymphedema     Memory loss, short term     MVP (mitral valve prolapse)     Nausea & vomiting     after general anesthesia    PCOS (polycystic ovarian syndrome)     PTSD (post-traumatic stress disorder)     Pulmonary embolism ([facility] [address] X2    Associated with surgery X2    Scoliosis     Stroke [facility])     2014    Sun-damaged skin     Sunburn, blistering     Tanning bed exposure     TIA on medication     TIA L hemiparesis - Was on diet pills    Vitamin B 12 deficiency     resolved      Past Surgical History:   Procedure Laterality Date    CARDIAC SURG PROCEDURE UNLIST      catheterization - heart attack, mitral valve prolapse    HX  SECTION      , PCOS    HX GASTRIC BYPASS  1999    [facility]: open gastric bypass, naseem,  umb hernia repair, tr. vagotomy    HX ORTHOPAEDIC  [id number],    bunion surgery, heel spur surgery    HX TONSILLECTOMY      As a child      Social   Social History     Tobacco Use    Smoking status: Never Smoker    Smokeless tobacco: Never Used    Tobacco comment: Never used vapor or e-cigs    Substance Use Topics    Alcohol use: No     Alcohol/week: 0.0 oz      Family History   Problem Relation Age of Onset    Hypertension Mother     Ovarian Cancer Mother         Spread to Throat and Lung     Cancer Mother 61        unknown type    Diabetes Father     Stroke Father     Hypertension Father     Breast Cancer Sister     Breast Cancer Maternal Grandmother         Passed away from COPD - Former smoker     COPD Maternal Grandmother     Cancer Maternal Grandmother 79        colon      Allergies   Allergen Reactions    Bee Sting [Sting, Bee] Anaphylaxis    Morphine Other (comments)     Severe stomach cramps     Vicodin [Hydrocodone-Acetaminophen] Hives      Prior to Admission Medications   Prescriptions Last Dose Informant Patient Reported? Taking? ALPRAZolam (XANAX) 1 mg tablet 3/14/2019 at pm  No Yes   Sig: Take 1.5 Tabs by mouth two (2) times daily as needed for up to 90 days. Max Daily Amount: 3 mg. DULoxetine (CYMBALTA) 60 mg capsule 3/14/2019 at am  No Yes   Sig: TAKE ONE CAPSULE BY MOUTH TWICE DAILY   EPINEPHrine (EPIPEN) 0.3 mg/0.3 mL injection Unknown at Unknown time  Yes No   Sig: epinephrine 0.3 mg/0.3 mL injection, auto-injector   FLUoxetine (PROZAC) 40 mg capsule 3/14/2019 at am  No Yes   Sig: Take 1 Cap by mouth daily. amoxicillin-clavulanate (AUGMENTIN) 875-125 mg per tablet Not Taking at Unknown time  No No   Sig: Take 1 Tab by mouth every twelve (12) hours for 10 days. bumetanide (BUMEX) 0.5 mg tablet Unknown at Unknown time  No No   Sig: Take 1 Tab by mouth daily. Indications: Edema   cyanocobalamin (VITAMIN B-12) 1,000 mcg tablet Unknown at Unknown time  Yes No   Sig: Take 1,000 mcg by mouth daily. cyclobenzaprine (FLEXERIL) 10 mg tablet 3/14/2019 at pm  Yes Yes   Sig: Take 10 mg by mouth nightly as needed. dextroamphetamine-amphetamine (ADDERALL) 30 mg tablet 3/14/2019 at am  No Yes   Sig: Take 1 Tab by mouth two (2) times a day.   Max Daily Amount: 2 Tabs   gabapentin (NEURONTIN) 400 mg capsule 3/14/2019 at am  No Yes   Sig: Take 2 Caps by mouth three (3) times daily. hydrOXYzine HCl (ATARAX) 25 mg tablet Unknown at Unknown time  No No   Sig: Take 1 Tab by mouth three (3) times daily as needed for Itching. ibuprofen (MOTRIN) 600 mg tablet 3/13/2019  No Yes   Sig: TAKE 1 TABLET BY MOUTH EVERY 6 HOURS AS NEEDED FOR PAIN   meclizine (ANTIVERT) 25 mg tablet Not Taking at Unknown time  No No   Sig: Take 1 Tab by mouth three (3) times daily as needed. montelukast (SINGULAIR) 10 mg tablet Not Taking at Unknown time  No No   Sig: Take 1 Tab by mouth daily. naproxen (NAPROSYN) 500 mg tablet 3/14/2019 at am  No Yes   Sig: Take 1 Tab by mouth two (2) times daily (with meals). omeprazole (PRILOSEC) 40 mg capsule Not Taking at Unknown time  Yes No   Sig: Take 40 mg by mouth once over twenty-four (24) hours. ondansetron (ZOFRAN ODT) 8 mg disintegrating tablet Unknown at Unknown time  No No   Sig: DISSOLVE 1 TABLET IN MOUTH EVERY 8 HOURS AS NEEDED FOR NAUSEA   prenatal 13/UJHN fum/folic/dha (PRENATAL-1 PO) 3/14/2019 at am  Yes Yes   Sig: Take  by mouth.   promethazine (PHENERGAN) 25 mg tablet 3/6/2019  Yes Yes   Sig: Take 25 mg by mouth once over twenty-four (24) hours. tiZANidine (ZANAFLEX) 2 mg tablet Not Taking at Unknown time  Yes No   Sig: Take 2 mg by mouth. Alternating with flexeril      Facility-Administered Medications: None       PHYSICAL EXAM:  The patient is examined immediately prior to the procedure. Visit Vitals  /83   Pulse (!) 58   Temp 98 °F (36.7 °C)   Resp 8   Ht 5' 5\" (1.651 m)   Wt 98.9 kg (218 lb)   SpO2 98%   Breastfeeding? No   BMI 36.28 kg/m²     Gen: Appears comfortable, no distress. Pulm: comfortable respirations with no abnormal audible breath sounds  HEART: well perfused, no abnormal audible heart sounds  GI: abdomen flat. PLAN:  Informed consent discussion held, patient afforded an opportunity to ask questions and all questions answered.   After being advised of the risks, benefits, and alternatives, the patient requested that we proceed and indicated so on a written consent form. Will proceed with procedure as planned.   Myrna Moeller MD

## 2019-03-18 ENCOUNTER — HOSPITAL ENCOUNTER (OUTPATIENT)
Dept: NON INVASIVE DIAGNOSTICS | Age: 59
Discharge: HOME | End: 2019-03-18
Attending: OBSTETRICS & GYNECOLOGY
Payer: MEDICARE

## 2019-03-18 ENCOUNTER — HOSPITAL ENCOUNTER (OUTPATIENT)
Dept: PREADMISSION TESTING | Age: 59
Discharge: HOME | End: 2019-03-18
Payer: MEDICARE

## 2019-03-18 ENCOUNTER — OFFICE VISIT (OUTPATIENT)
Dept: OBGYN CLINIC | Age: 59
End: 2019-03-18

## 2019-03-18 VITALS
DIASTOLIC BLOOD PRESSURE: 60 MMHG | BODY MASS INDEX: 37.39 KG/M2 | WEIGHT: 219 LBS | HEIGHT: 64 IN | SYSTOLIC BLOOD PRESSURE: 122 MMHG

## 2019-03-18 VITALS
RESPIRATION RATE: 16 BRPM | TEMPERATURE: 98.1 F | OXYGEN SATURATION: 98 % | BODY MASS INDEX: 37.43 KG/M2 | WEIGHT: 219.25 LBS | SYSTOLIC BLOOD PRESSURE: 141 MMHG | DIASTOLIC BLOOD PRESSURE: 81 MMHG | HEART RATE: 84 BPM | HEIGHT: 64 IN

## 2019-03-18 DIAGNOSIS — N95.0 POSTMENOPAUSAL BLEEDING: Primary | ICD-10-CM

## 2019-03-18 DIAGNOSIS — N95.0 POSTMENOPAUSAL BLEEDING: ICD-10-CM

## 2019-03-18 LAB
ABO + RH BLD: NORMAL
ATRIAL RATE: 69 BPM
BLOOD GROUP ANTIBODIES SERPL: NORMAL
CALCULATED P AXIS, ECG09: 59 DEGREES
CALCULATED R AXIS, ECG10: 17 DEGREES
CALCULATED T AXIS, ECG11: 33 DEGREES
DIAGNOSIS, 93000: NORMAL
P-R INTERVAL, ECG05: 144 MS
Q-T INTERVAL, ECG07: 390 MS
QRS DURATION, ECG06: 82 MS
QTC CALCULATION (BEZET), ECG08: 417 MS
SPECIMEN EXP DATE BLD: NORMAL
VENTRICULAR RATE, ECG03: 69 BPM

## 2019-03-18 PROCEDURE — 86900 BLOOD TYPING SEROLOGIC ABO: CPT

## 2019-03-18 PROCEDURE — 93005 ELECTROCARDIOGRAM TRACING: CPT

## 2019-03-18 PROCEDURE — 36415 COLL VENOUS BLD VENIPUNCTURE: CPT

## 2019-03-18 RX ORDER — LYSINE HCL 500 MG
1200 TABLET ORAL
COMMUNITY

## 2019-03-18 RX ORDER — PREDNISONE 10 MG/1
TABLET ORAL SEE ADMIN INSTRUCTIONS
COMMUNITY
End: 2019-08-06

## 2019-03-18 RX ORDER — ALPRAZOLAM 1 MG/1
1 TABLET ORAL
COMMUNITY
End: 2019-04-05 | Stop reason: SDUPTHER

## 2019-03-18 RX ORDER — LIDOCAINE 50 MG/G
3 PATCH TOPICAL EVERY 24 HOURS
COMMUNITY
End: 2020-02-05

## 2019-03-18 NOTE — PATIENT INSTRUCTIONS

## 2019-03-18 NOTE — PROGRESS NOTES
Postmenopausal bleeding note      Omar Mcguire is a 62 y.o. female who complains of vaginal bleeding after menopause and is scheduled for a H/S, D+C scheduled for 3/29/19. Also, her SIS showed irregular endometrial thickening. She is here today for a pre-op appointment. Last Pap smear:was normal, HPV neg 11/19/18. Her relevant past medical history:   Past Medical History:   Diagnosis Date    ADD (attention deficit disorder)     Anemia     Anxiety     Autoimmune disease ([facility]     fibromyalgia    Burn     Shoulder, back and buttocks  from use of heating pad.     CAD (coronary artery disease)     Chronic pain     Followed by Dr. Jen Grove Chronic pain     Fibromyalgia    Dysthymic disorder 11/9/2012    PTSD, memory loss short term /  dementia depression    Fibromyalgia     GERD (gastroesophageal reflux disease)     rarely    Herniated cervical disc     Ill-defined condition     lymphedema in legs    Ill-defined condition     dementia    Kidney stone     hx frequently    Lumbago     Lymphedema     uses compression stockings in daytime, flexitouch pump at night    Lymphedema     Memory loss, short term     MVP (mitral valve prolapse)     Nausea & vomiting     after general anesthesia    PCOS (polycystic ovarian syndrome)     PTSD (post-traumatic stress disorder)     Pulmonary embolism ([facility] [address] X2    Associated with surgery X2    Scoliosis     Sinus infection     saw PCP 3/15/19  started treatment 3/18/18    Stroke [facility])     april 2014    Sun-damaged skin     Sunburn, blistering     Tanning bed exposure     TIA on medication     TIA L hemiparesis - Was on diet pills 2015    TMJ (dislocation of temporomandibular joint)     Vitamin B 12 deficiency     resolved    Vitamin D deficiency         Past Surgical History:   Procedure Laterality Date    CARDIAC SURG PROCEDURE UNLIST  1994    catheterization - heart attack, mitral valve prolapse    COLONOSCOPY N/A 3/15/2019    COLONOSCOPY-no info to  performed by Claudius Lesches, MD at [address] [facility] HX BREAST AUGMENTATION Bilateral     [facility]    , PCOS    HX GASTRIC BYPASS  1999    [facility]: open gastric bypass, naseem,  umb hernia repair, tr. vagotomy    HX ORTHOPAEDIC  [id number],    bunion surgery, heel spur surgery    HX TONSILLECTOMY      As a child      Social History     Occupational History    Not on file   Tobacco Use    Smoking status: Never Smoker    Smokeless tobacco: Never Used    Tobacco comment: Never used vapor or e-cigs    Substance and Sexual Activity    Alcohol use: No     Alcohol/week: 0.0 oz    Drug use: No    Sexual activity: Not Currently     Partners: Male     Birth control/protection: None     Comment:      Family History   Problem Relation Age of Onset    Hypertension Mother     Ovarian Cancer Mother         Spread to Throat and [address] Mother 61        unknown type    Diabetes Father     Stroke Father     Hypertension Father     Breast Cancer Sister     Breast Cancer Maternal Grandmother         Passed away from COPD - Former smoker     COPD Maternal Grandmother     Cancer Maternal Grandmother 79        colon       Allergies   Allergen Reactions    Bee Sting [Sting, Bee] Anaphylaxis    Morphine Other (comments)     Severe stomach cramps     Vicodin [Hydrocodone-Acetaminophen] Hives     Prior to Admission medications    Medication Sig Start Date End Date Taking? Authorizing Provider   Calcium Carbonate-Vit D3-Min 600 mg calcium- 400 unit tab Take 1,200 Tabs by mouth Daily (before breakfast). Provider, Historical   predniSONE (STERAPRED DS) 10 mg dose pack Take  by mouth See Admin Instructions. See administration instruction per 10mg dose pack    Provider, Historical   OTHER Take 2 Tabs by mouth two (2) times a day.  Medication call \"Leg Cramps\"  PTC taken 2x daily for severe leg cramps    Provider, Historical   lidocaine (LIDODERM) 5 % 3 Patches by TransDERmal route every twenty-four (24) hours. Apply patch to the affected area for 12 hours a day and remove for 12 hours a day. Applies 2 patches on mid and lower back, and 3rd patch on knee, whichever is most painful at the time. Provider, Historical   ALPRAZolam (XANAX) 1 mg tablet Take 1 mg by mouth daily as needed for Anxiety. Provider, Historical   FLUoxetine (PROZAC) 40 mg capsule Take 1 Cap by mouth daily. Patient taking differently: Take 40 mg by mouth Daily (before breakfast). 3/12/19   Bela Doss MD   montelukast (SINGULAIR) 10 mg tablet Take 1 Tab by mouth daily. Patient taking differently: Take 10 mg by mouth Daily (before breakfast). Just perscribed by PCP for sinus infection, has not started as of yet. Instructed to begin today. 3/12/19   Bela Doss MD   amoxicillin-clavulanate (AUGMENTIN) 875-125 mg per tablet Take 1 Tab by mouth every twelve (12) hours for 10 days. Patient taking differently: Take 1 Tab by mouth every twelve (12) hours. Just got perscription from PCP for sinus infection 3/12/19 3/22/19  Bela Doss MD   ibuprofen (MOTRIN) 600 mg tablet TAKE 1 TABLET BY MOUTH EVERY 6 HOURS AS NEEDED FOR PAIN 3/4/19   Bela Doss MD   cyclobenzaprine (FLEXERIL) 10 mg tablet Take 10 mg by mouth nightly as needed. 2/21/19   Provider, Historical   tiZANidine (ZANAFLEX) 2 mg tablet Take 2 mg by mouth nightly. Alternating with flexeril 2/21/19   Provider, Historical   naproxen (NAPROSYN) 500 mg tablet Take 1 Tab by mouth two (2) times daily (with meals). Patient taking differently: Take 500 mg by mouth Daily (before breakfast). 2/25/19   Bela Doss MD   gabapentin (NEURONTIN) 400 mg capsule Take 2 Caps by mouth three (3) times daily. 1/25/19   Bela Doss MD   ALPRAZolam Ai Boland) 1 mg tablet Take 1.5 Tabs by mouth two (2) times daily as needed for up to 90 days. Max Daily Amount: 3 mg.   Patient taking differently: Take 1.5 mg by mouth nightly as needed. 1/25/19 4/25/19  Amira May MD   dextroamphetamine-amphetamine (ADDERALL) 30 mg tablet Take 1 Tab by mouth two (2) times a day. Max Daily Amount: 2 Tabs  Patient taking differently: Take 30 mg by mouth Daily (before breakfast). 1/25/19 4/25/19  Amira May MD   prenatal 34/KXFF fum/folic/dha (PRENATAL-1 PO) Take 1 Tab by mouth Daily (before breakfast). Mary Jo, MD Maddy   cyanocobalamin (VITAMIN B-12) 1,000 mcg tablet Take 1,000 mcg by mouth Daily (before breakfast). Mary Jo, MD Maddy   ondansetron (ZOFRAN ODT) 8 mg disintegrating tablet DISSOLVE 1 TABLET IN MOUTH EVERY 8 HOURS AS NEEDED FOR NAUSEA 11/9/18   Amira May MD   hydrOXYzine HCl (ATARAX) 25 mg tablet Take 1 Tab by mouth three (3) times daily as needed for Itching. 9/28/18   Amira May MD   promethazine (PHENERGAN) 25 mg tablet Take 25 mg by mouth every eight (8) hours as needed. 6/25/18   Provider, Historical   EPINEPHrine (EPIPEN) 0.3 mg/0.3 mL injection epinephrine 0.3 mg/0.3 mL injection, auto-injector    Provider, Historical   bumetanide (BUMEX) 0.5 mg tablet Take 1 Tab by mouth daily. Indications: Edema  Patient taking differently: Take 0.5 mg by mouth Daily (before breakfast).  6/26/18   Amira May MD   DULoxetine (CYMBALTA) 60 mg capsule TAKE ONE CAPSULE BY MOUTH TWICE DAILY 10/18/17   Isai Ryan, MAKEDA        Review of Systems - History obtained from the patient  Constitutional: negative for weight loss, fever, night sweats  HEENT: negative for hearing loss, earache, congestion, snoring, sorethroat  CV: negative for chest pain, palpitations, edema  Resp: negative for cough, shortness of breath, wheezing  Breast: negative for breast lumps, nipple discharge, galactorrhea  GI: negative for change in bowel habits, abdominal pain, black or bloody stools  : negative for frequency, dysuria, hematuria  MSK: negative for back pain, joint pain, muscle pain  Skin: negative for itching, rash, hives  Neuro: negative for dizziness, headache, confusion, weakness  Psych: negative for anxiety, depression, change in mood  Heme/lymph: negative for bleeding, bruising, pallor      Objective:  Visit Vitals  /60 (BP 1 Location: Left arm, BP Patient Position: Sitting)   Ht 5' 4\" (1.626 m)   Wt 219 lb (99.3 kg)   LMP 07/15/2011   BMI 37.59 kg/m²       Physical Exam:   PHYSICAL EXAMINATION    Constitutional  · Appearance: well-nourished, well developed, alert, in no acute distress    Gastrointestinal  · Abdominal Examination: abdomen non-tender to palpation, normal bowel sounds, no masses present  · Liver and spleen: no hepatomegaly present, spleen not palpable  · Hernias: no hernias identified    Genitourinary  · External Genitalia: normal appearance for age, no discharge present, no tenderness present, no inflammatory lesions present, no masses present, no atrophy present  · Vagina: normal vaginal vault without central or paravaginal defects, no discharge present, no inflammatory lesions present, no masses present  · Bladder: non-tender to palpation  · Urethra: appears normal  · Cervix: normal   · Uterus: normal size, shape and consistency  · Adnexa: no adnexal tenderness present, no adnexal masses present  · Perineum: perineum within normal limits, no evidence of trauma, no rashes or skin lesions present  · Anus: anus within normal limits, no hemorrhoids present  · Inguinal Lymph Nodes: no lymphadenopathy present    Skin  · General Inspection: no rash, no lesions identified    Neurologic/Psychiatric  · Mental Status:  · Orientation: grossly oriented to person, place and time  · Mood and Affect: mood normal, affect appropriate    Assessment:   Postmenopausal bleeding  Thickened lining    Plan:   st D&C withy myosure  Pt counseled concerning risks of surgery.  Risks disc include bleeding, transfusion, infection, readmission, abscess drainage, injury to abdominal organs including bowel, bladder, ureters, need for additional surgery, injury may not be recognized at time of surgery, and risk of death. Instructions given to pt. Handouts given to pt.

## 2019-03-18 NOTE — PERIOP NOTES
[address] [address] [facility]                            MAIN OR                                  [phone]   MAIN PRE OP                          [phone]                                                                                AMBULATORY PRE OP          [phone]  PRE-ADMISSION TESTING    [phone]     Surgery Date:   Friday 3/29/19       Is surgery arrival time given by surgeon? NO  If NO, [facility] staff will call you between 3 and 7pm the day before your surgery with your arrival time. (If your surgery is on a Monday, we will call you the Friday before.)    Call [phone] after 7pm Monday-Friday if you did not receive your arrival time. INSTRUCTIONS BEFORE YOUR SURGERY   When You  Arrive   Arrive at the [address] [facility] on the day of your surgery  Have your insurance card, photo ID, and any copayment (if needed)     Food   and   Drink   NO food or drink after midnight the night before surgery    This means NO water, gum, mints, coffee, juice, etc.  No alcohol (beer, wine, liquor) 24 hours before and after surgery     Medications to   TAKE   Morning of Surgery   MEDICATIONS TO TAKE THE MORNING OF SURGERY WITH A SIP OF WATER:    Alprazolam  (xanax)     Medications  To  STOP      7 days before surgery    Non-Steroidal anti-inflammatory Drugs (NSAID's): for example, Ibuprofen (Advil, Motrin), Naproxen (Aleve)   Aspirin, if taking for pain    Herbal supplements, vitamins, and fish oil   Other:  (Pain medications not listed above, including Tylenol may be taken)   Blood  Thinners    If you take  Aspirin, Plavix, Coumadin, or any blood-thinning or anti-blood clot medicine, talk to the doctor who prescribed the medications for pre-operative instructions.      Bathing Clothing  Jewelry  Valuables       If you shower the morning of surgery, please do not apply anything to your skin (lotions, powders, deodorant, or makeup, especially mascara)   Follow all special bath instructions (for total joint replacement, spine and bowel surgeries)   Do not shave or trim anywhere 24 hours before surgery   Wear your hair loose or down; no pony-tails, buns, or metal hair clips   Wear loose, comfortable, clean clothes   Wear glasses instead of contacts   Leave money, valuables, and jewelry, including body piercings, at home     Going Home       or Spending the Night    SAME-DAY SURGERY: You must have a responsible adult drive you home and stay with you 24 hours after surgery   ADMITS: If your doctor is keeping you into the hospital after surgery, leave personal belongings/luggage in your car until you have a hospital room number. Hospital discharge time is 12 noon  Drivers must be here before 12 noon unless you are told differently   Special Instructions Bring completed medication form on day of surgery. Free  parking         Follow all instructions so your surgery wont be cancelled. Please, be on time. If a situation occurs and you are delayed the day of surgery, call [phone] or          [phone]. If your physical condition changes (like a fever, cold, flu, etc.) call your surgeon. The patient was contacted  in person. Home medication reviewed and verified during PAT appointment. The patient verbalizes understanding of all instructions and does not  need reinforcement.

## 2019-03-19 LAB
BACTERIA SPEC CULT: NORMAL
BACTERIA SPEC CULT: NORMAL
SERVICE CMNT-IMP: NORMAL

## 2019-03-20 ENCOUNTER — TELEPHONE (OUTPATIENT)
Dept: FAMILY MEDICINE CLINIC | Age: 59
End: 2019-03-20

## 2019-03-20 NOTE — TELEPHONE ENCOUNTER
Patient states that she was told by Dr. Calos Mars that she needs a Bone Density scan for surgery next week and a refill of her oxycodone 20mg.

## 2019-03-28 ENCOUNTER — ANESTHESIA EVENT (OUTPATIENT)
Dept: SURGERY | Age: 59
End: 2019-03-28
Payer: MEDICARE

## 2019-03-28 NOTE — PERIOP NOTES
Patient with history of MRSA on her abdomen back in 7/2016. Refer to Dr. Meliza Morley ED note back on 7/12/2016 to see a picture of the multiple wounds that tested positive for MRSA. The patient did have a negative MRSA nasal culture done during her PAT appointment on 3/18/2019. Discussed the patient's case with Gavin Warren, Infection Prevention Nurse. The MRSA on the patient's banner cannot be resolved until it is confirmed that she no longer has any wounds. Until then, the patient will need to be on contact isolation. Order for contact isolation placed in [address] [place] [address] under sign and held, multi-phase orders for the day of surgery. DOS: 3/29/2019

## 2019-03-29 ENCOUNTER — TELEPHONE (OUTPATIENT)
Dept: OBGYN CLINIC | Age: 59
End: 2019-03-29

## 2019-03-29 ENCOUNTER — ANESTHESIA (OUTPATIENT)
Dept: SURGERY | Age: 59
End: 2019-03-29
Payer: MEDICARE

## 2019-03-29 ENCOUNTER — HOSPITAL ENCOUNTER (OUTPATIENT)
Age: 59
Setting detail: OUTPATIENT SURGERY
Discharge: HOME | End: 2019-03-29
Attending: OBSTETRICS & GYNECOLOGY | Admitting: OBSTETRICS & GYNECOLOGY
Payer: MEDICARE

## 2019-03-29 VITALS
SYSTOLIC BLOOD PRESSURE: 142 MMHG | TEMPERATURE: 98.1 F | HEIGHT: 64 IN | DIASTOLIC BLOOD PRESSURE: 75 MMHG | OXYGEN SATURATION: 96 % | WEIGHT: 219.25 LBS | HEART RATE: 62 BPM | RESPIRATION RATE: 11 BRPM | BODY MASS INDEX: 37.43 KG/M2

## 2019-03-29 DIAGNOSIS — N95.0 POSTMENOPAUSAL BLEEDING: ICD-10-CM

## 2019-03-29 PROCEDURE — 77030008684 HC TU ET CUF COVD -B: Performed by: ANESTHESIOLOGY

## 2019-03-29 PROCEDURE — 77030033650 HC DEV TISS RMVL MYOSUR HOLO -F: Performed by: OBSTETRICS & GYNECOLOGY

## 2019-03-29 PROCEDURE — 77030005537 HC CATH URETH BARD -A: Performed by: OBSTETRICS & GYNECOLOGY

## 2019-03-29 PROCEDURE — 74011250637 HC RX REV CODE- 250/637: Performed by: ANESTHESIOLOGY

## 2019-03-29 PROCEDURE — 76210000020 HC REC RM PH II FIRST 0.5 HR: Performed by: OBSTETRICS & GYNECOLOGY

## 2019-03-29 PROCEDURE — 74011250636 HC RX REV CODE- 250/636: Performed by: ANESTHESIOLOGY

## 2019-03-29 PROCEDURE — 76010000138 HC OR TIME 0.5 TO 1 HR: Performed by: OBSTETRICS & GYNECOLOGY

## 2019-03-29 PROCEDURE — 88305 TISSUE EXAM BY PATHOLOGIST: CPT

## 2019-03-29 PROCEDURE — 77030018836 HC SOL IRR NACL ICUM -A: Performed by: OBSTETRICS & GYNECOLOGY

## 2019-03-29 PROCEDURE — 76210000063 HC OR PH I REC FIRST 0.5 HR: Performed by: OBSTETRICS & GYNECOLOGY

## 2019-03-29 PROCEDURE — 77030033136 HC TBNG INFLO AQUILEX ST HOLO -C: Performed by: OBSTETRICS & GYNECOLOGY

## 2019-03-29 PROCEDURE — 74011250636 HC RX REV CODE- 250/636

## 2019-03-29 PROCEDURE — 77030020782 HC GWN BAIR PAWS FLX 3M -B

## 2019-03-29 PROCEDURE — 77030033137 HC TBNG OUTFLO AQUILEX ST HOLO -B: Performed by: OBSTETRICS & GYNECOLOGY

## 2019-03-29 PROCEDURE — 77030032490 HC SLV COMPR SCD KNE COVD -B

## 2019-03-29 PROCEDURE — 76060000032 HC ANESTHESIA 0.5 TO 1 HR: Performed by: OBSTETRICS & GYNECOLOGY

## 2019-03-29 RX ORDER — PROPOFOL 10 MG/ML
INJECTION, EMULSION INTRAVENOUS AS NEEDED
Status: DISCONTINUED | OUTPATIENT
Start: 2019-03-29 | End: 2019-03-29 | Stop reason: HOSPADM

## 2019-03-29 RX ORDER — LIDOCAINE HYDROCHLORIDE 10 MG/ML
0.1 INJECTION, SOLUTION EPIDURAL; INFILTRATION; INTRACAUDAL; PERINEURAL AS NEEDED
Status: DISCONTINUED | OUTPATIENT
Start: 2019-03-29 | End: 2019-03-29 | Stop reason: HOSPADM

## 2019-03-29 RX ORDER — SCOLOPAMINE TRANSDERMAL SYSTEM 1 MG/1
1 PATCH, EXTENDED RELEASE TRANSDERMAL
Status: DISCONTINUED | OUTPATIENT
Start: 2019-03-29 | End: 2019-03-29 | Stop reason: HOSPADM

## 2019-03-29 RX ORDER — SODIUM CHLORIDE, SODIUM LACTATE, POTASSIUM CHLORIDE, CALCIUM CHLORIDE 600; 310; 30; 20 MG/100ML; MG/100ML; MG/100ML; MG/100ML
125 INJECTION, SOLUTION INTRAVENOUS CONTINUOUS
Status: DISCONTINUED | OUTPATIENT
Start: 2019-03-29 | End: 2019-03-29 | Stop reason: HOSPADM

## 2019-03-29 RX ORDER — FENTANYL CITRATE 50 UG/ML
INJECTION, SOLUTION INTRAMUSCULAR; INTRAVENOUS AS NEEDED
Status: DISCONTINUED | OUTPATIENT
Start: 2019-03-29 | End: 2019-03-29 | Stop reason: HOSPADM

## 2019-03-29 RX ORDER — MIDAZOLAM HYDROCHLORIDE 1 MG/ML
INJECTION, SOLUTION INTRAMUSCULAR; INTRAVENOUS AS NEEDED
Status: DISCONTINUED | OUTPATIENT
Start: 2019-03-29 | End: 2019-03-29 | Stop reason: HOSPADM

## 2019-03-29 RX ORDER — KETOROLAC TROMETHAMINE 30 MG/ML
INJECTION, SOLUTION INTRAMUSCULAR; INTRAVENOUS AS NEEDED
Status: DISCONTINUED | OUTPATIENT
Start: 2019-03-29 | End: 2019-03-29 | Stop reason: HOSPADM

## 2019-03-29 RX ORDER — ONDANSETRON 2 MG/ML
INJECTION INTRAMUSCULAR; INTRAVENOUS AS NEEDED
Status: DISCONTINUED | OUTPATIENT
Start: 2019-03-29 | End: 2019-03-29 | Stop reason: HOSPADM

## 2019-03-29 RX ORDER — DIPHENHYDRAMINE HCL 25 MG
50 CAPSULE ORAL
COMMUNITY

## 2019-03-29 RX ORDER — ONDANSETRON 2 MG/ML
4 INJECTION INTRAMUSCULAR; INTRAVENOUS AS NEEDED
Status: DISCONTINUED | OUTPATIENT
Start: 2019-03-29 | End: 2019-03-29 | Stop reason: HOSPADM

## 2019-03-29 RX ORDER — HYDROMORPHONE HYDROCHLORIDE 2 MG/ML
.5-1 INJECTION, SOLUTION INTRAMUSCULAR; INTRAVENOUS; SUBCUTANEOUS
Status: DISCONTINUED | OUTPATIENT
Start: 2019-03-29 | End: 2019-03-29 | Stop reason: HOSPADM

## 2019-03-29 RX ADMIN — ONDANSETRON 4 MG: 2 INJECTION INTRAMUSCULAR; INTRAVENOUS at 08:04

## 2019-03-29 RX ADMIN — KETOROLAC TROMETHAMINE 30 MG: 30 INJECTION, SOLUTION INTRAMUSCULAR; INTRAVENOUS at 08:23

## 2019-03-29 RX ADMIN — PROPOFOL 150 MG: 10 INJECTION, EMULSION INTRAVENOUS at 07:56

## 2019-03-29 RX ADMIN — SODIUM CHLORIDE, SODIUM LACTATE, POTASSIUM CHLORIDE, AND CALCIUM CHLORIDE 125 ML/HR: 600; 310; 30; 20 INJECTION, SOLUTION INTRAVENOUS at 06:52

## 2019-03-29 RX ADMIN — MIDAZOLAM HYDROCHLORIDE 5 MG: 1 INJECTION, SOLUTION INTRAMUSCULAR; INTRAVENOUS at 07:47

## 2019-03-29 RX ADMIN — FENTANYL CITRATE 50 MCG: 50 INJECTION, SOLUTION INTRAMUSCULAR; INTRAVENOUS at 07:57

## 2019-03-29 NOTE — TELEPHONE ENCOUNTER
Call received at 302PM    62year old patient had Procedure(s): HYSTEROSCOPY DILATATION AND CURETTAGE WITH MYOSURE done today. Patient calling back regarding the results of her pathology.   This nurse did not see recent pathology    Please advise

## 2019-03-29 NOTE — TELEPHONE ENCOUNTER
This nurse attempted to reach the patient a second time and left a detailed message for the patient regarding the MD recommendations and tall the office back if any further questions .

## 2019-03-29 NOTE — PERIOP NOTES
Pt resting, reports pain is 7/10 to left hip, was same upon arrival to Pre-op this AM, verified pt's med list with her which includes several pain medications, pt reports she has them all at home besides her Oxycodone. This RN explained to pt that NSAIDS are normally taken for this procedure and that pt could continue her home medications per Dr Dionicio Longo D/C information, pt verbalized understanding. 0910 - Pt resting quietly, awakens to voice, reports left hip pain is unchanged. Repositioned for comfort. 1303 - Pt refuses to have D/C instructions discussed with gavin Moore, states \"she can sign for the safety concerns but nothing else\"

## 2019-03-29 NOTE — BRIEF OP NOTE
BRIEF OPERATIVE NOTE Date of Procedure: 3/29/2019 Preoperative Diagnosis: POST MENOPAUSAL BLEEDING Postoperative Diagnosis: POST MENOPAUSAL BLEEDING Procedure(s): HYSTEROSCOPY DILATATION AND CURETTAGE WITH Marge Ventura Surgeon(s) and Role: 
   Zofia Garber MD - Primary Surgical Assistant: none Surgical Staff: 
Circ-1: Rachel Ward RN Scrub Tech-1: Tariq Kill 
Event Time In Time Out Incision Start 0071 Incision Close 0825 Anesthesia: General  
Estimated Blood Loss: none Specimens:  
ID Type Source Tests Collected by Time Destination 1 : [facility] Preservative Endocervical Curetting  Merlyn Hayden MD 3/29/2019 4922 Pathology 2 : [facility] Preservative Endometrial Curetting  Merlyn Hayden MD 3/29/2019 7414 Pathology Findings: normal endometrium Complications: none Implants: * No implants in log *

## 2019-03-29 NOTE — OP NOTES
[facility]  OPERATIVE REPORT    Name:  Renetta Subramanian  MR#:  [id number]  :  1960  ACCOUNT #:  [de-identified]  DATE OF SERVICE:  2019      PREOPERATIVE DIAGNOSES:  Postmenopausal bleeding, thickened endometrium. POSTOPERATIVE DIAGNOSES:  Normal cavity, postmenopausal bleeding. PROCEDURE PERFORMED:  Diagnostic hysteroscopy, partial dilatation and curettage with MyoSure. SURGEON:  Osiris Potter MD    ASSISTANT:  None. ANESTHESIA:  General.    COMPLICATIONS:  None. SPECIMENS REMOVED:  Endometrial and endocervical curettings    IMPLANTS:  None. ESTIMATED BLOOD LOSS:  Less than 50 mL. TUBES AND DRAINS:  None. PROCEDURE:  After proper patient identification and consent were obtained, the patient was taken to the operating room where after successful induction of general anesthesia, she was placed in the dorsal lithotomy position, prepped and draped in the usual sterile fashion. Examination revealed a normal-sized uterus. Speculum was placed in the vagina. The anterior lip of the cervix was grasped with a single-tooth tenaculum. The cervix was then dilated with Hegar dilators in order to admit the hysteroscope. An endocervical curettage was performed using a small curette in all quadrants and a small amount of tissue was sent for pathology. The hysteroscope was then introduced into the endocervical canal and with hydrodissection, the endometrial cavity was entered. It was distended with normal saline. The cavity appeared normal.  There were no polyps or abnormalities. Both tubal ostia appeared normal.  The MyoSure LITE was then introduced and the cavity was sampled under direct visualization and a small amount of tissue was obtained. All the instruments were removed. Hemostasis was noted. Pad, needle, and sponge counts were correct x2. The patient was awakened from anesthesia and went to recovery in stable condition.       FARHAD PRYOR MD      [id number]  D:  03/29/2019 9:06  T:  03/29/2019 10:57  JOB #:  [id number]

## 2019-03-29 NOTE — TELEPHONE ENCOUNTER
Please call patient and let her know that the uterus looked normal today. I did not see anything concerning. Took biopsies of her lining and everything should be back next week.  No complications

## 2019-03-29 NOTE — TELEPHONE ENCOUNTER
This nurse attempted to reach the patient and left a message for the patient to call the office back.

## 2019-03-29 NOTE — DISCHARGE INSTRUCTIONS
POSTOPERATIVE INSTRUCTIONS  FOR D&C, HYSTEROSCOPY & NOVASURE      A D&C is a minor procedure. Your recovery from each one of these procedures should be relatively quick and uneventful. There are a few points that we ask you to remember:       1. Absolutely no intercourse, douching or tampon use for two weeks. 2. You can expect to have some vaginal bleeding or bloody vaginal discharge for the    next 2 to 4 weeks. 3. You may take tub baths after one week and showers at any time. 4. You may resume normal everyday activities as you feel able and return to work    within 2-5 days after surgery. 5. Notify us if you experience:     a.  heavy vaginal bleeding or foul vaginal discharge    b.  temperature of 101° or greater    c.  severe pelvic or vaginal pain      6. Please call the office today at [phone] to schedule your checkup appointment    in 4-6 weeks. Above all, please notify us of a problem if it arises before we see you again. In an emergency, you may contact a doctor 24 hours a day at [phone]. DISCHARGE SUMMARY from your Nurse    The following personal items collected during your admission are returned to you:   Dental Appliance: Dental Appliances: Uppers, Lowers  Vision: Visual Aid: Glasses  Hearing Aid:    Jewelry: Jewelry: None  Clothing: Clothing: (street clothes to locker)  Other Valuables: Other Valuables: None  Valuables sent to safe: Personal Items Sent to Safe: none    PATIENT INSTRUCTIONS:    After general anesthesia or intravenous sedation, for 24 hours or while taking prescription Narcotics:  · Limit your activities  · Do not drive and operate hazardous machinery  · Do not make important personal or business decisions  · Do  not drink alcoholic beverages  · If you have not urinated within 8 hours after discharge, please contact your surgeon on call.     Report the following to your surgeon:  · Excessive pain, swelling, redness or odor of or around the surgical area  · Temperature over 100.5  · Nausea and vomiting lasting longer than 4 hours or if unable to take medications  · Any signs of decreased circulation or nerve impairment to extremity: change in color, persistent  numbness, tingling, coldness or increase pain  · Any questions    COUGH AND DEEP BREATHE    Breathing deep and coughing are very important exercises to do after surgery. Deep breathing and coughing open the little air tubes and air sacks in your lungs. You take deep breaths every day. You may not even notice - it is just something you do when you sigh or yawn. It is a natural exercise you do to keep these air passages open. After surgery, take deep breaths and cough, on purpose. Coughing and deep breathing help prevent bronchitis and pneumonia after surgery. If you had chest or belly surgery, use a pillow as a \"hug andrew\" and hold it tightly to your chest or belly when you cough. DIRECTIONS:  6. Take 10 to 15 slow deep breaths every hour while awake. 7. Breathe in deeply, and hold it for 2 seconds. 8. Exhale slowly through puckered lips, like blowing up a balloon. 9. After every 4th or 5th deep breath, hug your pillow to your chest or belly and give a hard, deep cough. Yes, it will probably hurt. But doing this exercise is very important part of healing after surgery. Take your pain medicine to help you do this exercise without too much pain. IF YOU HAVE BEEN DIAGNOSED WITH SLEEP APNEA, PLEASE USE YOUR SLEEP APNEA DEVICE OR CPAP MACHINE WHEN YOU INTEND TO NAP AFTER TAKING PAIN MEDICATION. Ankle Pumps    Ankle pumps increase the circulation of oxygenated blood to your lower extremities and decrease your risk for circulation problems such as blood clots. They also stretch the muscles, tendons and ligaments in your foot and ankle, and prevent joint contracture in the ankle and foot, especially after surgeries on the legs.     It is important to do ankle pump exercises regularly after surgery because immobility increases your risk for developing a blood clot. Your doctor may also have you take an Aspirin for the next few days as well. If your doctor did not ask you to take an Aspirin, consult with him before starting Aspirin therapy on your own. Slowly point your foot forward, feeling the muscles on the top of your lower leg stretch, and hold this position for 5 seconds. Next, pull your foot back toward you as far as possible, stretching the calf muscles, and hold that position for 5 seconds. Repeat with the other foot. Perform 10 repetitions every hour while awake for both ankles if possible (down and then up with the foot once is one repetition). You should feel gentle stretching of the muscles in your lower leg when doing this exercise. If you feel pain, or your range of motion is limited, don't  Push too hard. Only go the limit your joint and muscles will let you go. If you have increasing pain, progressively worsening leg warmth or swelling, STOP the exercise and call your doctor. Below is information about the medications your doctor is prescribing after your visit:    Other information in your discharge envelope:  []     PRESCRIPTIONS  []     PHYSICAL THERAPY PRESCRIPTION  []     APPOINTMENT CARDS  []     Regional Anesthesia Pamphlet for block or block with On-Q Catheter from Anesthesia Service  []     Medical device information sheets/pamphlets from their    []     School/work excuse note. []     /parent work excuse note. These are general instructions for a healthy lifestyle:    *  Please give a list of your current medications to your Primary Care Provider. *  Please update this list whenever your medications are discontinued, doses are      changed, or new medications (including over-the-counter products) are added.   *  Please carry medication information at all times in case of emergency situations. About Smoking  No smoking / No tobacco products / Avoid exposure to second hand smoke    Surgeon General's Warning:  Quitting smoking now greatly reduces serious risk to your health. Obesity, smoking, and sedentary lifestyle greatly increases your risk for illness and disease. A healthy diet, regular physical exercise & weight monitoring are important for maintaining a healthy lifestyle. Congestive Heart Failure  You may be retaining fluid if you have a history of heart failure or if you experience any of the following symptoms:  Weight gain of 3 pounds or more overnight or 5 pounds in a week, increased swelling in our hands or feet or shortness of breath while lying flat in bed. Please call your doctor as soon as you notice any of these symptoms; do not wait until your next office visit. Recognize signs and symptoms of STROKE:  F - face looks uneven  A - arms unable to move or move even  S - speech slurred or non-existent  T - time-call 911 as soon as signs and symptoms begin-DO NOT go         Back to bed or wait to see if you get better-TIME IS BRAIN. Warning signs of HEART ATTACK  Call 911 if you have these symptoms    · Chest discomfort. Most heart attacks involve discomfort in the center of the chest that lasts more than a few minutes, or that goes away and comes back. It can feel like uncomfortable pressure, squeezing, fullness, or pain. · Discomfort in other areas of the upper body. Symptoms can include pain or discomfort in one or both        Arms, the back, neck, jaw, or stomach. ·  Shortness of breath with or without chest discomfort. · Other signs may include breaking out in a cold sweat, nausea, or lightheadedness    Don't wait more than five minutes to call 911 - MINUTES MATTER! Fast action can save your life. Calling 911 is almost always the fastest way to get lifesaving treatment.   Emergency Medical Services staff can begin treatment when they arrive - up to an hour sooner than if someone gets to the hospital by car. DAGOBERTO CHOE MEDICATION AND SIDE EFFECT GUIDE    The [facility] MEDICATION AND SIDE EFFECT GUIDE was provided to the PATIENT AND CARE PROVIDER.   Information provided includes instruction about drug purpose and common side effects for the following medications:

## 2019-03-29 NOTE — ANESTHESIA POSTPROCEDURE EVALUATION
Procedure(s): HYSTEROSCOPY DILATATION AND CURETTAGE WITH MYOSURE. 
 
general 
 
Anesthesia Post Evaluation Multimodal analgesia: multimodal analgesia not used between 6 hours prior to anesthesia start to PACU discharge Patient location during evaluation: PACU Patient participation: complete - patient participated Level of consciousness: awake and alert Pain score: 0 Pain management: satisfactory to patient Airway patency: patent Anesthetic complications: no 
Cardiovascular status: acceptable Respiratory status: acceptable Hydration status: acceptable Post anesthesia nausea and vomiting:  none Vitals Value Taken Time /83 3/29/2019  9:00 AM  
Temp Pulse 63 3/29/2019  9:04 AM  
Resp 11 3/29/2019  9:04 AM  
SpO2 97 % 3/29/2019  9:04 AM  
Vitals shown include unvalidated device data.

## 2019-03-29 NOTE — ANESTHESIA PREPROCEDURE EVALUATION
Relevant Problems No relevant active problems Anesthetic History PONV Review of Systems / Medical History Patient summary reviewed, nursing notes reviewed and pertinent labs reviewed Pulmonary Within defined limits Neuro/Psych Psychiatric history Pertinent negatives: No TIA and CVA Comments: Depression. PTSD Cardiovascular Valvular problems/murmurs: mitral insufficiency Pertinent negatives: No CAD Exercise tolerance: >4 METS 
  
GI/Hepatic/Renal 
  
GERD: well controlled Endo/Other Obesity Pertinent negatives: No morbid obesity Other Findings Physical Exam 
 
Airway Mallampati: II 
 
Neck ROM: normal range of motion Mouth opening: Normal 
 
 Cardiovascular Rhythm: regular Rate: normal 
 
 
 
 Dental 
 
Dentition: Edentulous Pulmonary Breath sounds clear to auscultation Abdominal 
GI exam deferred Other Findings Anesthetic Plan ASA: 3 Anesthesia type: general 
 
 
 
 
Induction: Intravenous Anesthetic plan and risks discussed with: Patient

## 2019-03-29 NOTE — H&P
Gynecology History and Physical 
 
Name: Lisa Clancy MRN: [id number] SSN: [id number] YOB: 1960  Age: 62 y.o. Sex: female Subjective: Chief complaint:  Postmenopausal bleeding Magdy Pike is a 62 y.o.  female with a history of postmenopausal bleeding. Previous workup included an endometrial biopsy which was benign and a sonohysterogram which thickened lining. Previous treatment measures included observation. She is admitted for Procedure(s) (LRB): 
HYSTEROSCOPY DILATATION AND CURETTAGE WITH MYOSURE (N/A). The current method of family planning is none. OB History Bridgett Salazar 1 Para 1 Term  
0  1 AB Living  
1 SAB  
   
 TAB Ectopic Molar Multiple Live Births 1 Past Medical History:  
Diagnosis Date  ADD (attention deficit disorder)  Anemia  Anxiety  Autoimmune disease ([address]   
 fibromyalgia  Burn Shoulder, back and buttocks  from use of heating pad.  CAD (coronary artery disease)  Chronic pain Followed by Dr. Eliza Aragon  Chronic pain Fibromyalgia  Dysthymic disorder 2012 PTSD, memory loss short term /  dementia depression  Fibromyalgia  GERD (gastroesophageal reflux disease)   
 rarely  Herniated cervical disc  Ill-defined condition   
 lymphedema in legs  Ill-defined condition   
 dementia  Kidney stone   
 hx frequently  Lumbago  Lymphedema   
 uses compression stockings in daytime, flexitouch pump at night  Lymphedema  Memory loss, short term  MVP (mitral valve prolapse)  Nausea & vomiting   
 after general anesthesia  PCOS (polycystic ovarian syndrome)  PTSD (post-traumatic stress disorder)  Pulmonary embolism ([address] X2 Associated with surgery X2  Scoliosis  Sinus infection   
 saw PCP 3/15/19  started treatment 3/18/18  Stroke [facility])   
 2014  Sun-damaged skin  Sunburn, blistering  Tanning bed exposure  TIA on medication TIA L hemiparesis - Was on diet pills   TMJ (dislocation of temporomandibular joint)  Vitamin B 12 deficiency   
 resolved  Vitamin D deficiency Past Surgical History:  
Procedure Laterality Date 4 [facility]  
 catheterization - heart attack, mitral valve prolapse  COLONOSCOPY N/A 3/15/2019 COLONOSCOPY-no info to  performed by Alvaro Canas MD at [address] Bilateral 2000 [facility] , PCOS  
 HX GASTRIC BYPASS  1999 [facility]: open gastric bypass, naseem,  umb hernia repair, tr. vagotomy  HX ORTHOPAEDIC  [id number],  
 bunion surgery, heel spur surgery  HX TONSILLECTOMY As a child Social History Occupational History  Not on file Tobacco Use  Smoking status: Never Smoker  Smokeless tobacco: Never Used  Tobacco comment: Never used vapor or e-cigs Substance and Sexual Activity  Alcohol use: No  
  Alcohol/week: 0.0 oz  Drug use: No  
 Sexual activity: Not Currently Partners: Male Birth control/protection: None Comment:  Family History Problem Relation Age of Onset  Hypertension Mother  Ovarian Cancer Mother Spread to Throat and Lung  Cancer Mother 61  
     unknown type  Diabetes Father  Stroke Father  Hypertension Father  Breast Cancer Sister  Breast Cancer Maternal Grandmother Passed away from COPD - Former smoker  COPD Maternal Grandmother  Cancer Maternal Grandmother 79  
     colon Allergies Allergen Reactions  Bee Sting [Sting, Bee] Anaphylaxis  Morphine Other (comments) Severe stomach cramps  Vicodin [Hydrocodone-Acetaminophen] Hives Prior to Admission medications Medication Sig Start Date End Date Taking? Authorizing Provider diphenhydrAMINE (BENADRYL) 25 mg capsule Take 50 mg by mouth nightly. Yes Provider, Historical  
ALPRAZolam (XANAX) 1 mg tablet Take 1 mg by mouth daily as needed for Anxiety. Yes Provider, Historical  
FLUoxetine (PROZAC) 40 mg capsule Take 1 Cap by mouth daily. Patient taking differently: Take 40 mg by mouth Daily (before breakfast). 3/12/19  Yes Gui Spear MD  
gabapentin (NEURONTIN) 400 mg capsule Take 2 Caps by mouth three (3) times daily. 1/25/19  Yes Gui Spear MD  
ALPRAZolam Rubén Jude) 1 mg tablet Take 1.5 Tabs by mouth two (2) times daily as needed for up to 90 days. Max Daily Amount: 3 mg. Patient taking differently: Take 1.5 mg by mouth nightly as needed. 1/25/19 4/25/19 Yes Gui Spear MD  
DULoxetine (CYMBALTA) 60 mg capsule TAKE ONE CAPSULE BY MOUTH TWICE DAILY 10/18/17  Yes Cecilia Ryan NP Calcium Carbonate-Vit D3-Min 600 mg calcium- 400 unit tab Take 1,200 Tabs by mouth Daily (before breakfast). Provider, Historical  
predniSONE (STERAPRED DS) 10 mg dose pack Take  by mouth See Admin Instructions. See administration instruction per 10mg dose pack    Provider, Historical  
OTHER Take 2 Tabs by mouth two (2) times a day. Medication call \"Leg Cramps\"  PTC taken 2x daily for severe leg cramps    Provider, Historical  
lidocaine (LIDODERM) 5 % 3 Patches by TransDERmal route every twenty-four (24) hours. Apply patch to the affected area for 12 hours a day and remove for 12 hours a day. Applies 2 patches on mid and lower back, and 3rd patch on knee, whichever is most painful at the time. Provider, Historical  
montelukast (SINGULAIR) 10 mg tablet Take 1 Tab by mouth daily. Patient taking differently: Take 10 mg by mouth Daily (before breakfast). Just perscribed by PCP for sinus infection, has not started as of yet. Instructed to begin today.  3/12/19   Gui Spear MD  
ibuprofen (MOTRIN) 600 mg tablet TAKE 1 TABLET BY MOUTH EVERY 6 HOURS AS NEEDED FOR PAIN 3/4/19   Harsha Shook MD  
cyclobenzaprine (FLEXERIL) 10 mg tablet Take 10 mg by mouth nightly as needed. 2/21/19   Provider, Historical  
tiZANidine (ZANAFLEX) 2 mg tablet Take 2 mg by mouth nightly. Alternating with flexeril 2/21/19   Provider, Historical  
naproxen (NAPROSYN) 500 mg tablet Take 1 Tab by mouth two (2) times daily (with meals). Patient taking differently: Take 500 mg by mouth Daily (before breakfast). 2/25/19   Harsha Shook MD  
dextroamphetamine-amphetamine (ADDERALL) 30 mg tablet Take 1 Tab by mouth two (2) times a day. Max Daily Amount: 2 Tabs Patient taking differently: Take 30 mg by mouth Daily (before breakfast). 1/25/19 4/25/19  Harsha Shook MD  
prenatal 19/[facility]HL fum/folic/dha (PRENATAL-1 PO) Take 1 Tab by mouth Daily (before breakfast). Other, MD Maddy  
cyanocobalamin (VITAMIN B-12) 1,000 mcg tablet Take 1,000 mcg by mouth Daily (before breakfast). Other, MD Maddy  
ondansetron (ZOFRAN ODT) 8 mg disintegrating tablet DISSOLVE 1 TABLET IN MOUTH EVERY 8 HOURS AS NEEDED FOR NAUSEA 11/9/18   Harsha Shook MD  
promethazine (PHENERGAN) 25 mg tablet Take 25 mg by mouth every eight (8) hours as needed. 6/25/18   Provider, Historical  
EPINEPHrine (EPIPEN) 0.3 mg/0.3 mL injection epinephrine 0.3 mg/0.3 mL injection, auto-injector    Provider, Historical  
bumetanide (BUMEX) 0.5 mg tablet Take 1 Tab by mouth daily. Indications: Edema Patient taking differently: Take 0.5 mg by mouth Daily (before breakfast). 6/26/18   Harsha Shook MD  
  
 
Review of Systems: A comprehensive review of systems was negative except for that written in the History of Present Illness. Objective:  
 
Vitals:  
 03/29/19 6525 BP: 120/55 Pulse: 60 Resp: 14 Temp: 98.4 °F (36.9 °C) SpO2: 99% Weight: 219 lb 4 oz (99.5 kg) Height: 5' 4\" (1.626 m) Physical Exam: 
Heart: Regular rate and rhythm Lung: clear to auscultation throughout lung fields, no wheezes, no rales, no rhonchi and normal respiratory effort Abdomen: soft, nontender External Genitalia: normal general appearance Urinary system: urethral meatus normal 
Vagina: normal mucosa without prolapse or lesions Cervix: normal appearance Adnexa: normal bimanual exam 
Uterus: normal single, nontender Assessment:  
 
Postmenopausal bleeding with thickened endometrium Plan:  
 
Procedure(s) (LRB): 
HYSTEROSCOPY DILATATION AND CURETTAGE WITH MYOSURE (N/A) Discussed the risks of surgery including the risks of bleeding, infection, deep vein thrombosis, and surgical injuries to internal organs including but not limited to the bowels, bladder, rectum, and female reproductive organs. The patient understands the risks; any and all questions were answered to the patient's satisfaction. Signed By:  Jewels Shrestha MD   
 March 29, 2019

## 2019-04-01 ENCOUNTER — TELEPHONE (OUTPATIENT)
Dept: OBGYN CLINIC | Age: 59
End: 2019-04-01

## 2019-04-01 NOTE — TELEPHONE ENCOUNTER
Pt called voice mail and wants to know results from procedure last Friday and whether or not she can take her ostena and pamprin again since she is having pain.
05-Feb-2017

## 2019-04-03 ENCOUNTER — TELEPHONE (OUTPATIENT)
Dept: OBGYN CLINIC | Age: 59
End: 2019-04-03

## 2019-04-03 NOTE — TELEPHONE ENCOUNTER
Pt calling for results from procedure done on Friday. I let her know that they were normal.  She wants to know what to do next. She would also like to have a hormone panel done if possible. She is aware that dr Kezia Bernstein is out until next week and pt is ok to wait until then.

## 2019-04-05 DIAGNOSIS — F41.9 ANXIETY: Primary | ICD-10-CM

## 2019-04-10 RX ORDER — ALPRAZOLAM 1 MG/1
TABLET ORAL
Qty: 270 TAB | Refills: 0 | OUTPATIENT
Start: 2019-04-10 | End: 2019-10-15 | Stop reason: SDUPTHER

## 2019-04-21 DIAGNOSIS — I89.0 LYMPHEDEMA OF BOTH LOWER EXTREMITIES: ICD-10-CM

## 2019-04-21 DIAGNOSIS — G89.29 CHRONIC MIDLINE LOW BACK PAIN WITHOUT SCIATICA: ICD-10-CM

## 2019-04-21 DIAGNOSIS — M54.50 CHRONIC MIDLINE LOW BACK PAIN WITHOUT SCIATICA: ICD-10-CM

## 2019-04-25 RX ORDER — IBUPROFEN 600 MG/1
TABLET ORAL
Qty: 90 TAB | Refills: 1 | OUTPATIENT
Start: 2019-04-25

## 2019-04-25 RX ORDER — BUMETANIDE 0.5 MG/1
TABLET ORAL
Qty: 90 TAB | Refills: 1 | Status: SHIPPED | OUTPATIENT
Start: 2019-04-25 | End: 2019-08-06

## 2019-04-25 NOTE — PROGRESS NOTES
Disregard previous note about SIS- That is incorrect. I meant post op scheduled for 4/25/19. Pt cancelled post op appointment.

## 2019-04-30 ENCOUNTER — OFFICE VISIT (OUTPATIENT)
Dept: FAMILY MEDICINE CLINIC | Age: 59
End: 2019-04-30

## 2019-04-30 VITALS
OXYGEN SATURATION: 98 % | HEIGHT: 64 IN | SYSTOLIC BLOOD PRESSURE: 128 MMHG | DIASTOLIC BLOOD PRESSURE: 80 MMHG | TEMPERATURE: 98.1 F | HEART RATE: 78 BPM | RESPIRATION RATE: 18 BRPM | WEIGHT: 222 LBS | BODY MASS INDEX: 37.9 KG/M2

## 2019-04-30 DIAGNOSIS — M62.830 LUMBAR PARASPINAL MUSCLE SPASM: ICD-10-CM

## 2019-04-30 DIAGNOSIS — M94.9 DISORDER OF BONE AND CARTILAGE: ICD-10-CM

## 2019-04-30 DIAGNOSIS — M25.552 LEFT HIP PAIN: ICD-10-CM

## 2019-04-30 DIAGNOSIS — I89.0 LYMPHEDEMA OF BOTH LOWER EXTREMITIES: Primary | ICD-10-CM

## 2019-04-30 DIAGNOSIS — Z01.84 IMMUNITY STATUS TESTING: ICD-10-CM

## 2019-04-30 DIAGNOSIS — R11.0 NAUSEA: ICD-10-CM

## 2019-04-30 DIAGNOSIS — M54.50 CHRONIC MIDLINE LOW BACK PAIN WITHOUT SCIATICA: ICD-10-CM

## 2019-04-30 DIAGNOSIS — G89.29 CHRONIC MIDLINE LOW BACK PAIN WITHOUT SCIATICA: ICD-10-CM

## 2019-04-30 DIAGNOSIS — M89.9 DISORDER OF BONE AND CARTILAGE: ICD-10-CM

## 2019-04-30 RX ORDER — SCOLOPAMINE TRANSDERMAL SYSTEM 1 MG/1
1 PATCH, EXTENDED RELEASE TRANSDERMAL
Qty: 10 PATCH | Refills: 2 | Status: SHIPPED | OUTPATIENT
Start: 2019-04-30 | End: 2020-01-17

## 2019-04-30 RX ORDER — METHOCARBAMOL 500 MG/1
500 TABLET, FILM COATED ORAL
Qty: 90 TAB | Refills: 1 | Status: SHIPPED | OUTPATIENT
Start: 2019-04-30 | End: 2019-08-06

## 2019-04-30 RX ORDER — BUPRENORPHINE 10 UG/H
1 PATCH TRANSDERMAL
COMMUNITY
End: 2019-08-06

## 2019-04-30 RX ORDER — DULOXETIN HYDROCHLORIDE 60 MG/1
CAPSULE, DELAYED RELEASE ORAL
Qty: 180 CAP | Refills: 1 | Status: SHIPPED | OUTPATIENT
Start: 2019-04-30 | End: 2019-10-15 | Stop reason: SDUPTHER

## 2019-04-30 RX ORDER — IBUPROFEN 800 MG/1
800 TABLET ORAL
Qty: 90 TAB | Refills: 2 | Status: SHIPPED | OUTPATIENT
Start: 2019-04-30 | End: 2019-06-18

## 2019-04-30 NOTE — PROGRESS NOTES
Identified pt with two pt identifiers(name and ). Chief Complaint   Patient presents with    Hip Pain     Left side.  Swelling     Lymphedema is getting worse    Fall     is falling a lot more. twice today    Immunization/Injection     would like to get MMR     New Order     order for massage for back pain.  Medication Refill     for rash under belly        Health Maintenance Due   Topic    Shingrix Vaccine Age 50> (1 of 2)    MEDICARE YEARLY EXAM        Wt Readings from Last 3 Encounters:   19 222 lb (100.7 kg)   19 219 lb 4 oz (99.5 kg)   19 219 lb 4 oz (99.5 kg)     Temp Readings from Last 3 Encounters:   19 98.1 °F (36.7 °C) (Oral)   19 98.1 °F (36.7 °C)   19 98.1 °F (36.7 °C)     BP Readings from Last 3 Encounters:   19 128/80   19 142/75   19 141/81     Pulse Readings from Last 3 Encounters:   19 78   19 62   19 84         Learning Assessment:  :     Learning Assessment 2016 2015 2015 1/3/2014   PRIMARY LEARNER Patient Patient Patient Patient   HIGHEST LEVEL OF EDUCATION - PRIMARY LEARNER  - - - 4 YEARS OF COLLEGE   BARRIERS PRIMARY LEARNER - NONE - COGNITIVE   CO-LEARNER CAREGIVER - No - -   PRIMARY LANGUAGE ENGLISH ENGLISH ENGLISH ENGLISH   LEARNER PREFERENCE PRIMARY DEMONSTRATION DEMONSTRATION VIDEOS READING     - - - VIDEOS   ANSWERED BY patient  patient patient patient   RELATIONSHIP SELF SELF SELF SELF       Depression Screening:  :     3 most recent PHQ Screens 2018   Little interest or pleasure in doing things Nearly every day   Feeling down, depressed, irritable, or hopeless More than half the days   Total Score PHQ 2 5       Fall Risk Assessment:  :     Fall Risk Assessment, last 12 mths 2018   Able to walk? Yes   Fall in past 12 months? Yes   Fall with injury?  Yes   Number of falls in past 12 months 5   Fall Risk Score 6       Abuse Screening:  :     Abuse Screening Questionnaire 7/23/2018 6/30/2015 3/10/2014   Do you ever feel afraid of your partner? N N N   Are you in a relationship with someone who physically or mentally threatens you? N N N   Is it safe for you to go home? Y Y Y       Coordination of Care Questionnaire:  :     1) Have you been to an emergency room, urgent care clinic since your last visit? no   Hospitalized since your last visit? no             2) Have you seen or consulted any other health care providers outside of [address] [place] [address] since your last visit? no  (Include any pap smears or colon screenings in this section.)    3) Do you have an Advance Directive on file? no  Are you interested in receiving information about Advance Directives? no    Reviewed record in preparation for visit and have obtained necessary documentation. Medication reconciliation up to date and corrected with patient at this time.

## 2019-04-30 NOTE — PATIENT INSTRUCTIONS
A Healthy Lifestyle: Care Instructions  Your Care Instructions    A healthy lifestyle can help you feel good, stay at a healthy weight, and have plenty of energy for both work and play. A healthy lifestyle is something you can share with your whole family. A healthy lifestyle also can lower your risk for serious health problems, such as high blood pressure, heart disease, and diabetes. You can follow a few steps listed below to improve your health and the health of your family. Follow-up care is a key part of your treatment and safety. Be sure to make and go to all appointments, and call your doctor if you are having problems. It's also a good idea to know your test results and keep a list of the medicines you take. How can you care for yourself at home? · Do not eat too much sugar, fat, or fast foods. You can still have dessert and treats now and then. The goal is moderation. · Start small to improve your eating habits. Pay attention to portion sizes, drink less juice and soda pop, and eat more fruits and vegetables. ? Eat a healthy amount of food. A 3-ounce serving of meat, for example, is about the size of a deck of cards. Fill the rest of your plate with vegetables and whole grains. ? Limit the amount of soda and sports drinks you have every day. Drink more water when you are thirsty. ? Eat at least 5 servings of fruits and vegetables every day. It may seem like a lot, but it is not hard to reach this goal. A serving or helping is 1 piece of fruit, 1 cup of vegetables, or 2 cups of leafy, raw vegetables. Have an apple or some carrot sticks as an afternoon snack instead of a candy bar. Try to have fruits and/or vegetables at every meal.  · Make exercise part of your daily routine. You may want to start with simple activities, such as walking, bicycling, or slow swimming. Try to be active 30 to 60 minutes every day. You do not need to do all 30 to 60 minutes all at once.  For example, you can exercise 3 times a day for 10 or 20 minutes. Moderate exercise is safe for most people, but it is always a good idea to talk to your doctor before starting an exercise program.  · Keep moving. Lori Bainss the lawn, work in the garden, or [employer]. Take the stairs instead of the elevator at work. · If you smoke, quit. People who smoke have an increased risk for heart attack, stroke, cancer, and other lung illnesses. Quitting is hard, but there are ways to boost your chance of quitting tobacco for good. ? Use nicotine gum, patches, or lozenges. ? Ask your doctor about stop-smoking programs and medicines. ? Keep trying. In addition to reducing your risk of diseases in the future, you will notice some benefits soon after you stop using tobacco. If you have shortness of breath or asthma symptoms, they will likely get better within a few weeks after you quit. · Limit how much alcohol you drink. Moderate amounts of alcohol (up to 2 drinks a day for men, 1 drink a day for women) are okay. But drinking too much can lead to liver problems, high blood pressure, and other health problems. Family health  If you have a family, there are many things you can do together to improve your health. · Eat meals together as a family as often as possible. · Eat healthy foods. This includes fruits, vegetables, lean meats and dairy, and whole grains. · Include your family in your fitness plan. Most people think of activities such as jogging or tennis as the way to fitness, but there are many ways you and your family can be more active. Anything that makes you breathe hard and gets your heart pumping is exercise. Here are some tips:  ? Walk to do errands or to take your child to school or the bus.  ? Go for a family bike ride after dinner instead of watching TV. Where can you learn more? Go to http://jonathon-margareth.info/. Enter R348 in the search box to learn more about \"A Healthy Lifestyle: Care Instructions. \"  Current as of: September 11, 2018  Content Version: 11.9  © 8806-3674 [employer]. Care instructions adapted under license by [employer] (which disclaims liability or warranty for this information). If you have questions about a medical condition or this instruction, always ask your healthcare professional. [address] any warranty or liability for your use of this information.

## 2019-04-30 NOTE — PROGRESS NOTES
Subjective:      Derek Eng is a 62 y.o. female here with multiple complaints. Lymphedema: \"it's out of control\". She has been sent new garments. States that she will need referral to [facility]ymphedema [facility]. States that she was advised that she would need manual lymphatic drainage and compression therapy via [facility]. Reports that fluid has progressed from the legs and now into the ankles and there is rippling of the skin. She would be looking to establish care at [facility]. Requests for PT order form to be faxed to [facility], Attn: Liseth ([phone]). Reports that she has been falling due to increased fluid in the LE edema. States that her insurance will cover massage if medically necessary for low back pain. She needs medication refills. She would like to see Ortho for left hip pain. She would like to have MMR vaccination. She reports that she has not had vaccine since she was younger. She has grandchildren and is concerned about the current measles outbreak. Current Outpatient Medications   Medication Sig Dispense Refill    buprenorphine (BUTRANS) 10 mcg/hour 1 Patch by TransDERmal route every seven (7) days.  bumetanide (BUMEX) 0.5 mg tablet TAKE 1 TABLET BY MOUTH ONCE DAILY 90 Tab 1    ALPRAZolam (XANAX) 1 mg tablet TAKE 1 & 1/2 (ONE & ONE-HALF) TABLETS BY MOUTH TWICE DAILY AS NEEDED FOR 90 DAYS -  MAX  DAILY  AMOUNT  3MG 270 Tab 0    diphenhydrAMINE (BENADRYL) 25 mg capsule Take 50 mg by mouth nightly.  Calcium Carbonate-Vit D3-Min 600 mg calcium- 400 unit tab Take 1,200 Tabs by mouth Daily (before breakfast).  predniSONE (STERAPRED DS) 10 mg dose pack Take  by mouth See Admin Instructions. See administration instruction per 10mg dose pack      OTHER Take 2 Tabs by mouth two (2) times a day. Medication call \"Leg Cramps\"  PTC taken 2x daily for severe leg cramps      lidocaine (LIDODERM) 5 % 3 Patches by TransDERmal route every twenty-four (24) hours.  Apply patch to the affected area for 12 hours a day and remove for 12 hours a day. Applies 2 patches on mid and lower back, and 3rd patch on knee, whichever is most painful at the time.  FLUoxetine (PROZAC) 40 mg capsule Take 1 Cap by mouth daily. (Patient taking differently: Take 40 mg by mouth Daily (before breakfast). ) 90 Cap 1    montelukast (SINGULAIR) 10 mg tablet Take 1 Tab by mouth daily. (Patient taking differently: Take 10 mg by mouth Daily (before breakfast). Just perscribed by PCP for sinus infection, has not started as of yet. Instructed to begin today.) 90 Tab 1    ibuprofen (MOTRIN) 600 mg tablet TAKE 1 TABLET BY MOUTH EVERY 6 HOURS AS NEEDED FOR PAIN 90 Tab 1    cyclobenzaprine (FLEXERIL) 10 mg tablet Take 10 mg by mouth nightly as needed.  tiZANidine (ZANAFLEX) 2 mg tablet Take 2 mg by mouth nightly. Alternating with flexeril      naproxen (NAPROSYN) 500 mg tablet Take 1 Tab by mouth two (2) times daily (with meals). (Patient taking differently: Take 500 mg by mouth Daily (before breakfast). ) 60 Tab 5    gabapentin (NEURONTIN) 400 mg capsule Take 2 Caps by mouth three (3) times daily. 540 Cap 1    prenatal 30/RKGX fum/folic/dha (PRENATAL-1 PO) Take 1 Tab by mouth Daily (before breakfast).  cyanocobalamin (VITAMIN B-12) 1,000 mcg tablet Take 1,000 mcg by mouth Daily (before breakfast).  ondansetron (ZOFRAN ODT) 8 mg disintegrating tablet DISSOLVE 1 TABLET IN MOUTH EVERY 8 HOURS AS NEEDED FOR NAUSEA 20 Tab 3    promethazine (PHENERGAN) 25 mg tablet Take 25 mg by mouth every eight (8) hours as needed.       EPINEPHrine (EPIPEN) 0.3 mg/0.3 mL injection epinephrine 0.3 mg/0.3 mL injection, auto-injector      DULoxetine (CYMBALTA) 60 mg capsule TAKE ONE CAPSULE BY MOUTH TWICE DAILY 60 Cap 2       Allergies   Allergen Reactions    Bee Sting [Sting, Bee] Anaphylaxis    Morphine Other (comments)     Severe stomach cramps     Vicodin [Hydrocodone-Acetaminophen] Hives       Past Medical History:   Diagnosis Date    ADD (attention deficit disorder)     Anemia     Anxiety     Autoimmune disease ([facility] [address]     fibromyalgia    Burn     Shoulder, back and buttocks  from use of heating pad.  CAD (coronary artery disease)     Chronic pain     Followed by Dr. Aidan Sanabria Chronic pain     Fibromyalgia    Dysthymic disorder 11/9/2012    PTSD, memory loss short term /  dementia depression    Fibromyalgia     GERD (gastroesophageal reflux disease)     rarely    Herniated cervical disc     Ill-defined condition     lymphedema in legs    Ill-defined condition     dementia    Kidney stone     hx frequently    Lumbago     Lymphedema     uses compression stockings in daytime, flexitouch pump at night    Lymphedema     Memory loss, short term     MVP (mitral valve prolapse)     Nausea & vomiting     after general anesthesia    PCOS (polycystic ovarian syndrome)     PTSD (post-traumatic stress disorder)     Pulmonary embolism ([facility] [address] X2    Associated with surgery X2    Scoliosis     Sinus infection     saw PCP 3/15/19  started treatment 3/18/18    Stroke [facility])     april 2014    Sun-damaged skin     Sunburn, blistering     Tanning bed exposure     TIA on medication     TIA L hemiparesis - Was on diet pills 2015    TMJ (dislocation of temporomandibular joint)     Vitamin B 12 deficiency     resolved    Vitamin D deficiency        Social History     Tobacco Use    Smoking status: Never Smoker    Smokeless tobacco: Never Used    Tobacco comment: Never used vapor or e-cigs    Substance Use Topics    Alcohol use: No     Alcohol/week: 0.0 oz        Review of Systems  Pertinent items are noted in HPI.      Objective:     Visit Vitals  /80 (BP 1 Location: Right arm, BP Patient Position: Sitting) Comment: manual   Pulse 78   Temp 98.1 °F (36.7 °C) (Oral) Comment: .   Resp 18   Ht 5' 4\" (1.626 m)   Wt 222 lb (100.7 kg)   LMP 07/15/2011   SpO2 98%   BMI 38.11 kg/m²      General appearance - alert, well appearing, and in no distress  Eyes - pupils equal and reactive, extraocular eye movements intact, sclera anicteric  Chest - clear to auscultation, no wheezes, rales or rhonchi, symmetric air entry, no tachypnea, retractions or cyanosis  Heart - normal rate, regular rhythm, normal S1, S2, no murmurs, rubs, clicks or gallops  Extremities - bilateral LE edema     Assessment/Plan:   Pal Nur is a 62 y.o. female seen for:     1. Lymphedema of both lower extremities  - REFERRAL TO LYMPHEDEMA CLINIC    2. Left hip pain  - REFERRAL TO ORTHOPEDICS  - ibuprofen (MOTRIN) 800 mg tablet; Take 1 Tab by mouth every eight (8) hours as needed for Pain. Dispense: 90 Tab; Refill: 2    3. Immunity status testing: check MMR titers. - MEASLES/MUMPS/RUBELLA IMMUNITY    4. Chronic midline low back pain without sciatica  - DULoxetine (CYMBALTA) 60 mg capsule; TAKE ONE CAPSULE BY MOUTH TWICE DAILY  Dispense: 180 Cap; Refill: 1    5. Lumbar paraspinal muscle spasm  - ibuprofen (MOTRIN) 800 mg tablet; Take 1 Tab by mouth every eight (8) hours as needed for Pain. Dispense: 90 Tab; Refill: 2  - methocarbamol (ROBAXIN) 500 mg tablet; Take 1 Tab by mouth three (3) times daily as needed (Muscle Spasm). Dispense: 90 Tab; Refill: 1    6. Disorder of bone and cartilage  - DEXA BONE DENSITY STUDY AXIAL; Future    7. Nausea  - scopolamine (TRANSDERM-SCOP) 1 mg over 3 days pt3d; 1 Patch by TransDERmal route every seventy-two (72) hours. Dispense: 10 Patch; Refill: 2    I have discussed the diagnosis with the patient and the intended plan as seen in the above orders. The patient has received an after-visit summary and questions were answered concerning future plans. I have discussed medication side effects and warnings with the patient as well. Patient verbalizes understanding of plan of care and denies further questions or concerns at this time.  Informed patient to return to the office if symptoms worsen or if new symptoms arise. Follow-up and Dispositions    · Return in about 3 months (around 7/30/2019).

## 2019-05-02 DIAGNOSIS — L30.4 INTERTRIGO: Primary | ICD-10-CM

## 2019-05-02 RX ORDER — NYSTATIN 100000 [USP'U]/G
POWDER TOPICAL
Qty: 60 G | Refills: 5 | Status: SHIPPED | OUTPATIENT
Start: 2019-05-02 | End: 2019-09-05 | Stop reason: SDUPTHER

## 2019-05-02 RX ORDER — NYSTATIN 100000 U/G
CREAM TOPICAL
Qty: 30 G | Refills: 5 | Status: SHIPPED | OUTPATIENT
Start: 2019-05-02

## 2019-05-02 NOTE — TELEPHONE ENCOUNTER
Medications were discussed at her OV on 4/30/19 but were not sent to pharmacy. Sent today. Requested Prescriptions     Signed Prescriptions Disp Refills    nystatin (MYCOSTATIN) topical cream 30 g 5     Sig: Apply  to affected area two (2) times daily as needed for Skin Irritation or Itching. Authorizing Provider: Danis Gordillo nystatin (MYCOSTATIN) powder 60 g 5     Sig: Apply  to affected area four (4) times daily as needed (Skin irritation/itching).      Authorizing Provider: Joseph Grider

## 2019-05-07 ENCOUNTER — APPOINTMENT (OUTPATIENT)
Dept: PHYSICAL THERAPY | Age: 59
End: 2019-05-07

## 2019-05-10 RX ORDER — ESTROGENS, CONJUGATED 0.62 MG/1
0.62 TABLET, FILM COATED ORAL
OUTPATIENT
Start: 2019-05-10

## 2019-06-13 DIAGNOSIS — M54.50 CHRONIC MIDLINE LOW BACK PAIN WITHOUT SCIATICA: ICD-10-CM

## 2019-06-13 DIAGNOSIS — G89.29 CHRONIC MIDLINE LOW BACK PAIN WITHOUT SCIATICA: ICD-10-CM

## 2019-06-18 RX ORDER — IBUPROFEN 600 MG/1
TABLET ORAL
Qty: 90 TAB | Refills: 1 | Status: SHIPPED | OUTPATIENT
Start: 2019-06-18 | End: 2020-07-16 | Stop reason: SDUPTHER

## 2019-06-19 ENCOUNTER — HOSPITAL ENCOUNTER (OUTPATIENT)
Dept: PHYSICAL THERAPY | Age: 59
Discharge: HOME | End: 2019-06-19
Payer: MEDICARE

## 2019-06-19 ENCOUNTER — TELEPHONE (OUTPATIENT)
Dept: FAMILY MEDICINE CLINIC | Age: 59
End: 2019-06-19

## 2019-06-19 PROCEDURE — 97530 THERAPEUTIC ACTIVITIES: CPT

## 2019-06-19 PROCEDURE — 97162 PT EVAL MOD COMPLEX 30 MIN: CPT

## 2019-06-19 PROCEDURE — 97140 MANUAL THERAPY 1/> REGIONS: CPT

## 2019-06-19 NOTE — PROGRESS NOTES
[place]  [facility]  [address]  [address]  [address]        INITIAL EVALUATION    NAME: Chu Philip AGE: 62 y.o. GENDER: female  DATE: 6/20/2019  REFERRING PHYSICIAN: Jhoana Chen MD  HISTORY AND BACKGROUND:  Patient is a 62year old female with PMHx as documented below who is referred to this clinic for the evaluation and treatment of LE lymphedema. She has been treated at this clinic in the past for lipoedema and has received compression garments through the [facility]. She denies any history of cellulitis or DVT. Her left knee reportedly gives out on her; pt reports multiple falls. Pt arrives ambulatory without AD, says she has a rollator at home but it is \"too short. \" Pt says she has gone to [facility] for Lymphedema treatment as well and they got her a flexitouch pump. Pt reports she uses pump 2x/day for 2 hours at a time and occasionally uses pump a third time. Pt states, \"the pump used to help, but now I feel like the compression needs to be adjusted because I'm not getting the same benefit I was before. \" Pt presents today asking for lymphatic massage, bandaging, replacement garments, and for therapist to contact [employer] to have them adjust pump settings. Pt says she never received compression garments that were ordered at last visit in Oct. 2017. Primary Diagnosis:  ? Bilateral LE lipoedema with secondary lymphedema component (I89.0)  Other Treatment Diagnoses:  · Swelling not relieved by elevation (R60.9)  · Morbid obesity (E66.01)  Date of Onset: 2011 original onset, referred to lymphedema clinic 4/2014 order for assessment of lymphedema. Returned to clinic 2/2016 and was fit with compression garments that she obtained from the [facility].    Present Symptoms and Functional Limitations: LE swelling that causes limbs to feel heavy and painful  Lymphedema Life Impact Scale: Score of 47 and impairment percentage of 69%. See scanned document. Past Medical History:   Past Medical History:   Diagnosis Date    ADD (attention deficit disorder)     Anemia     Anxiety     Autoimmune disease ([address]     fibromyalgia    Burn     Shoulder, back and buttocks  from use of heating pad.     CAD (coronary artery disease)     Chronic pain     Followed by Dr. Dariana Richardson Chronic pain     Fibromyalgia    Dysthymic disorder 2012    PTSD, memory loss short term /  dementia depression    Fibromyalgia     GERD (gastroesophageal reflux disease)     rarely    Herniated cervical disc     Ill-defined condition     lymphedema in legs    Ill-defined condition     dementia    Kidney stone     hx frequently    Lumbago     Lymphedema     uses compression stockings in daytime, flexitouch pump at night    Lymphedema     Memory loss, short term     MVP (mitral valve prolapse)     Nausea & vomiting     after general anesthesia    PCOS (polycystic ovarian syndrome)     PTSD (post-traumatic stress disorder)     Pulmonary embolism ([facility] [address] X2    Associated with surgery X2    Scoliosis     Sinus infection     saw PCP 3/15/19  started treatment 3/18/18    Stroke [facility])     2014    Sun-damaged skin     Sunburn, blistering     Tanning bed exposure     TIA on medication     TIA L hemiparesis - Was on diet pills     TMJ (dislocation of temporomandibular joint)     Vitamin B 12 deficiency     resolved    Vitamin D deficiency      Past Surgical History:   Procedure Laterality Date    CARDIAC SURG PROCEDURE UNLIST      catheterization - heart attack, mitral valve prolapse    COLONOSCOPY N/A 3/15/2019    COLONOSCOPY-no info to  performed by Namrata Perez MD at [address] [facility] HX BREAST AUGMENTATION Bilateral     HX [address]    , PCOS    HX DILATION AND CURETTAGE  2019    H/S, D+C with myosure--normal path with endometrial polyp    HX GASTRIC BYPASS  04/02/1999    NH: open gastric bypass, naseem,  umb hernia repair, tr. vagotomy    HX ORTHOPAEDIC  [id number],    bunion surgery, heel spur surgery    HX TONSILLECTOMY      As a child      Current Medications:    Current Outpatient Medications   Medication Sig    ibuprofen (MOTRIN) 600 mg tablet TAKE 1 TABLET BY MOUTH EVERY 6 HOURS AS NEEDED FOR PAIN    nystatin (MYCOSTATIN) topical cream Apply  to affected area two (2) times daily as needed for Skin Irritation or Itching.  nystatin (MYCOSTATIN) powder Apply  to affected area four (4) times daily as needed (Skin irritation/itching).  buprenorphine (BUTRANS) 10 mcg/hour 1 Patch by TransDERmal route every seven (7) days.  DULoxetine (CYMBALTA) 60 mg capsule TAKE ONE CAPSULE BY MOUTH TWICE DAILY    methocarbamol (ROBAXIN) 500 mg tablet Take 1 Tab by mouth three (3) times daily as needed (Muscle Spasm).  scopolamine (TRANSDERM-SCOP) 1 mg over 3 days pt3d 1 Patch by TransDERmal route every seventy-two (72) hours.  bumetanide (BUMEX) 0.5 mg tablet TAKE 1 TABLET BY MOUTH ONCE DAILY    ALPRAZolam (XANAX) 1 mg tablet TAKE 1 & 1/2 (ONE & ONE-HALF) TABLETS BY MOUTH TWICE DAILY AS NEEDED FOR 90 DAYS -  MAX  DAILY  AMOUNT  3MG    diphenhydrAMINE (BENADRYL) 25 mg capsule Take 50 mg by mouth nightly.  Calcium Carbonate-Vit D3-Min 600 mg calcium- 400 unit tab Take 1,200 Tabs by mouth Daily (before breakfast).  predniSONE (STERAPRED DS) 10 mg dose pack Take  by mouth See Admin Instructions. See administration instruction per 10mg dose pack    OTHER Take 2 Tabs by mouth two (2) times a day. Medication call \"Leg Cramps\"  PTC taken 2x daily for severe leg cramps    lidocaine (LIDODERM) 5 % 3 Patches by TransDERmal route every twenty-four (24) hours. Apply patch to the affected area for 12 hours a day and remove for 12 hours a day.  Applies 2 patches on mid and lower back, and 3rd patch on knee, whichever is most painful at the time.  FLUoxetine (PROZAC) 40 mg capsule Take 1 Cap by mouth daily. (Patient taking differently: Take 40 mg by mouth Daily (before breakfast). )    montelukast (SINGULAIR) 10 mg tablet Take 1 Tab by mouth daily. (Patient taking differently: Take 10 mg by mouth Daily (before breakfast). Just perscribed by PCP for sinus infection, has not started as of yet. Instructed to begin today.)    tiZANidine (ZANAFLEX) 2 mg tablet Take 2 mg by mouth nightly. Alternating with flexeril    naproxen (NAPROSYN) 500 mg tablet Take 1 Tab by mouth two (2) times daily (with meals). (Patient taking differently: Take 500 mg by mouth Daily (before breakfast). )    gabapentin (NEURONTIN) 400 mg capsule Take 2 Caps by mouth three (3) times daily.  prenatal 31/AMPI fum/folic/dha (PRENATAL-1 PO) Take 1 Tab by mouth Daily (before breakfast).  cyanocobalamin (VITAMIN B-12) 1,000 mcg tablet Take 1,000 mcg by mouth Daily (before breakfast).  ondansetron (ZOFRAN ODT) 8 mg disintegrating tablet DISSOLVE 1 TABLET IN MOUTH EVERY 8 HOURS AS NEEDED FOR NAUSEA    promethazine (PHENERGAN) 25 mg tablet Take 25 mg by mouth every eight (8) hours as needed.  EPINEPHrine (EPIPEN) 0.3 mg/0.3 mL injection epinephrine 0.3 mg/0.3 mL injection, auto-injector     No current facility-administered medications for this encounter. Allergies: Allergies   Allergen Reactions    Bee Sting [Sting, Bee] Anaphylaxis    Morphine Other (comments)     Severe stomach cramps     Vicodin [Hydrocodone-Acetaminophen] Hives      Prior Level of Function/Social/Work History/Personal factors and/or comorbidities impacting plan of care: Patient does not work; she is on disability. She is currently  from her .     Living Situation: private residence with spouse who is also on disability                 Trainable Caregiver?: self  Self-care/ADLs: Indep                  Mobility: Indep      Previous Therapy: 2014 received a pair of compression pantyhose through the [facility] and then in 2016, received a pair of Juzo thigh length 3512 OTS compression stockings and 2 pairs of the [employer] Compre[employer] through the [facility]. Pt reports she never received garments that were ordered for her in Oct 2017 (Juzo compression bike shorts and OTS thigh length compression stockings, CCL 1)    SUBJECTIVE:  \"They told me I need a doctor's note to adjust the pump and make it stronger. Can you write that? I'd stay in the pump all day if it would help get this swelling down. \"     Patients goals for therapy: reduce swelling, obtain new garments     EVALUATION AND OBJECTIVE DATA SUMMARY:   Pain:   B LEs: 5/10                                Self-Care and ADLs:    Grooming: Independent  Bathing: Independent    UB Dressing: Independent  LB Dressing: Independent    Don/Doff Shoes/Socks: Independent  Toileting: Independent    Other:     Skin and Tissue Assessment:  Dermal Status:  [x]   Intact []  Dry   []  Tenuous [] Flaky   []  Wound/lesion present []  Scars   []  Dermatitis    Texture/Consistency:  [x]  Boggy: very loose skin, especially proximal thighs with fatty tissue deposits  []  Pitting Edema   []  Brawny []  Combination   []  Fibrotic/Woody    Pigmentation/Color Change:  [x]  Normal []  Hemosiderin   []  Red []  Erythematous   []  Hyperpigmented []  Hyperlipodermatosclerosis   Anomalies:  []  Lymphorrhea []  Vesicles   []  Petechiae []  Warty Vercusis   []  Bullae []  Papilloma   Circulatory:  []  SHEA []  Varicosities:   []  Pulses [x]  Vascular studies ruled out DVT in past   []  DVT History    Nails:  [x]   Normal  []   Fungus    Stemmers Sign: negative   Height:  Height: 5' 5\" (165.1 cm)  Weight:  Weight: 99.3 kg (219 lb)   BMI:  BMI (calculated): 36.4  (36 or greater: adversely affecting lymphedema)  Wound/Ulcer:  None       Wound Pain:   N/a   Volumetric Measurements:   Right:  13,751. 57 mL Left:  14,526.99 mL   % Difference: 5.64%  Dominance:    (See scanned graph)    R LE has reduced by 942.3 mL since Oct 2017  L LE has reduced by 182.9 mL since Oct 2017    Range of Motion: [facility]  Strength: WFL    Sensation:  Intact  Mobility:   Independent  Safety:  Patient is alert and oriented:  Yes, x 4   Safety awareness:  Good    Fall Risk?:  Yes, moderate due to chronic pain and impaired balance. Pt reports multiple falls but does not use AD. Patient given written fall prevention handout: Yes   Precautions:  Standard lymphedema precautions to include avoiding blood pressure readings, injections and IVs or other procedures/acts that could lead to broken skin on affected area, and avoiding excessive heat, resistive activity or altitude without compression garment    Evaluation Time: 10:45 - 11:15 am (30 minutes)    TREATMENT PROVIDED:   Treatment time:  11:15 - 11:45 am  Minutes: 30   1. Treatment description:  Therapeutic Activity x 2  Re-educated the patient on the difference between lipoedema vs lymphedema. I explained that Medicare will not cover compression garments, and she has previously received compression garments through the [facility] which is a one time use. Pt asking today about bandaging and lymphatic massage. Explained to patient that she would need to purchase bandaging supplies and it may not reduce swelling a significant amount since pt is primarily lipoedema. Discussed risks vs benefits of full leg bandaging that may further impede her mobility and put her at increased risk for falls. Pt in agreement. Also explained that her pump at home will provide the benefit of the lymphatic massage and her flexitouch should already be set at specific parameters and not in need of adjusting. Educated the patient in importance of resistance training to build muscle mass to help manage her lymphedema. Opportunity for questions and clarification were provided. Treatment time:  11:45 - 12:00 pm  Minutes: 15  2. Treatment description:  Manual Therapy  X 1  Patient was measured for Juzo 3512 pantyhose that she has worn previously, size IV regular, beige. Order sent to [place] [facility]. Pt reports she is going to seek financial assistance from her [employer] to pay for garments. Advised pt to contact clinic once she receives them to schedule appt for garment fitting. ASSESSMENT:   Osito Mariano is a 62 y.o. female who presents with lipedema with secondary lymphedema affecting primarily her lower extremities. Patient also c/o intermittent swelling in her arms and hands. There is no evidence of pitting edema in her LEs or UEs, which is more consistent with lipedema, a chronic adipose tissue disorder, versus lymphedema, which results from impaired transport capacity in the lymphatic system. While manual lymphatic drainage can be effective at treating lymphedema, it is less indicated for lipedema. She would benefit from wearing full leg compression garments to help manage her condition and needs replacement garments as hers are worn through and no longer therapeutic. Pt opted for compression pantyhose which she has worn previously. Will send order for Juzo 3512 pantyhose, size IV regular to [facility]. Financial resources continue to be a problem for her, she has no insurance coverage for stockings, and has previously been granted compression garments gratis through the [facility] in the past. Pt says she will seek financial assistance from her [employer] to pay for garments. Pt to return for garment fitting once she receives garments. This care is medically necessary due to the infection risk with lymphedema and to improve functional activities. CDT is necessary to resolve swelling to allow patient to return to wearing normal clothes/footwear and prevent worsening of symptoms such as venous stasis ulcerations, infections, or hospitalizations.   Patient will be independent with home program strategies to allow improved ADL ability and mobility and to allow patient to return to greatest functional independence. Rehabilitation potential is considered to be Fair. Factors which may influence rehabilitation potential include cost of garments. Patient will benefit from 2-4 PRN physical therapy visits over 6-8 weeks to optimize improvement in these areas. PLAN OF CARE:   Recommendations and Planned Interventions:  Compression garment fitting/provision  Lymphedema therapeutic exercise  Education in skin care and lymphedema precautions  Self-MLD education per home program  SCD as indicated      GOALS    Long term goals  Time frame: 8 weeks  1. Pt will show improvement in Lymphedema Life Impact Scale by 10 points for improved tolerance for work and recreational activities and thus allow improvement in patient's quality of life. 2.  Patient will be independent with don/doff of compression system and use in order to prevent reaccumulation of fluid at discharge. 3.  Pt will be independent in self-MLD and show stable limb volumes showing decongestion and pt. ready for transition to independent restorative phase of lymphedema therapy. Physical Therapy Evaluation Charge Determination   History Examination Presentation Decision-Making   HIGH Complexity :3+ comorbidities / personal factors will impact the outcome/ POC  LOW Complexity : 1-2 Standardized tests and measures addressing body structure, function, activity limitation and / or participation in recreation  LOW Complexity : Stable, uncomplicated  Other outcome measures LLIS  MEDIUM      Based on the above components, the patient evaluation is determined to be of the following complexity level: LOW     Patient has participated in goal setting and agrees to work toward plan of care. Patient was instructed to call if questions or concerns arise. Thank you for this referral.  Delfino Jauregui.  Willam, PT, DPT, CLT    Time Calculation: 75 mins    TREATMENT PLAN EFFECTIVE DATES:   6/20/2019 TO 9/11/2019  I have read the above plan of care for Axel Guzman. I certify the above prescribed services are required by this patient and are medically necessary.   The above plan of care has been developed in conjunction with the lymphedema/physical therapist.       Physician Signature: ____________________________________Date:______________

## 2019-06-19 NOTE — TELEPHONE ENCOUNTER
----- Message from Phillip Bustillos sent at 6/19/2019 12:50 PM EDT -----  Regarding: Dr. Simone James / Telephone   Pt is needing a referral for her entire back to be sent to [facility] ([phone]) for therapy.    Fax: [phone]

## 2019-06-20 VITALS — BODY MASS INDEX: 36.49 KG/M2 | WEIGHT: 219 LBS | HEIGHT: 65 IN

## 2019-06-20 RX ORDER — TIZANIDINE 2 MG/1
2 TABLET ORAL
Qty: 90 TAB | Refills: 1 | Status: SHIPPED | OUTPATIENT
Start: 2019-06-20 | End: 2019-08-06 | Stop reason: SDUPTHER

## 2019-07-03 ENCOUNTER — APPOINTMENT (OUTPATIENT)
Dept: PHYSICAL THERAPY | Age: 59
End: 2019-07-03

## 2019-07-06 DIAGNOSIS — G89.29 CHRONIC MIDLINE LOW BACK PAIN WITHOUT SCIATICA: ICD-10-CM

## 2019-07-06 DIAGNOSIS — M54.50 CHRONIC MIDLINE LOW BACK PAIN WITHOUT SCIATICA: ICD-10-CM

## 2019-07-10 RX ORDER — TIZANIDINE 2 MG/1
TABLET ORAL
Qty: 90 TAB | Refills: 2 | OUTPATIENT
Start: 2019-07-10

## 2019-08-06 ENCOUNTER — OFFICE VISIT (OUTPATIENT)
Dept: FAMILY MEDICINE CLINIC | Age: 59
End: 2019-08-06

## 2019-08-06 VITALS
WEIGHT: 217 LBS | DIASTOLIC BLOOD PRESSURE: 98 MMHG | BODY MASS INDEX: 36.15 KG/M2 | OXYGEN SATURATION: 99 % | TEMPERATURE: 98.4 F | HEIGHT: 65 IN | RESPIRATION RATE: 18 BRPM | HEART RATE: 89 BPM | SYSTOLIC BLOOD PRESSURE: 138 MMHG

## 2019-08-06 DIAGNOSIS — M62.830 LUMBAR PARASPINAL MUSCLE SPASM: ICD-10-CM

## 2019-08-06 DIAGNOSIS — G44.201 INTRACTABLE TENSION-TYPE HEADACHE, UNSPECIFIED CHRONICITY PATTERN: ICD-10-CM

## 2019-08-06 DIAGNOSIS — L03.031 PARONYCHIA OF GREAT TOE OF RIGHT FOOT: Primary | ICD-10-CM

## 2019-08-06 RX ORDER — ONDANSETRON 8 MG/1
TABLET, ORALLY DISINTEGRATING ORAL
Qty: 20 TAB | Refills: 3 | Status: SHIPPED | OUTPATIENT
Start: 2019-08-06 | End: 2019-12-10 | Stop reason: SDUPTHER

## 2019-08-06 RX ORDER — TIZANIDINE 2 MG/1
2 TABLET ORAL 3 TIMES DAILY
Qty: 90 TAB | Refills: 2 | Status: SHIPPED | OUTPATIENT
Start: 2019-08-06 | End: 2019-10-15

## 2019-08-06 RX ORDER — BUTALBITAL, ACETAMINOPHEN AND CAFFEINE 300; 40; 50 MG/1; MG/1; MG/1
1 CAPSULE ORAL
Qty: 30 CAP | Refills: 0 | Status: SHIPPED | OUTPATIENT
Start: 2019-08-06 | End: 2019-10-15

## 2019-08-06 RX ORDER — SULFAMETHOXAZOLE AND TRIMETHOPRIM 800; 160 MG/1; MG/1
1 TABLET ORAL 2 TIMES DAILY
Qty: 20 TAB | Refills: 0 | Status: SHIPPED | OUTPATIENT
Start: 2019-08-06 | End: 2019-08-16

## 2019-08-06 NOTE — PATIENT INSTRUCTIONS
Paronychia: Care Instructions  Your Care Instructions  Paronychia (say \"ccsk-[id number]-jill-uh\") is an infection of the skin around a fingernail or toenail. It happens when germs enter through a break in the skin. The doctor may have made a small cut in the infected area to drain the pus. Most cases of paronychia improve in a few days. But watch your symptoms and follow your doctor's advice. Though rare, a mild case can turn into something more serious and infect your entire finger or toe. Also, it is possible for an infection to return. Follow-up care is a key part of your treatment and safety. Be sure to make and go to all appointments, and call your doctor if you are having problems. It's also a good idea to know your test results and keep a list of the medicines you take. How can you care for yourself at home? · If your doctor told you how to care for your infected nail, follow the doctor's instructions. If you did not get instructions, follow this general advice:  ? Wash the area with clean water 2 times a day. Don't use hydrogen peroxide or alcohol, which can slow healing. ? You may cover the area with a thin layer of petroleum jelly, such as Vaseline, and a nonstick bandage. ? Apply more petroleum jelly and replace the bandage as needed. · If your doctor prescribed antibiotics, take them as directed. Do not stop taking them just because you feel better. You need to take the full course of antibiotics. · Take an over-the-counter pain medicine, such as acetaminophen (Tylenol), ibuprofen (Advil, Motrin), or naproxen (Aleve). Read and follow all instructions on the label. · Do not take two or more pain medicines at the same time unless the doctor told you to. Many pain medicines have acetaminophen, which is Tylenol. Too much acetaminophen (Tylenol) can be harmful. · Prop up the toe or finger so that it is higher than the level of your heart. This will help with pain and swelling. · Apply heat.  Put a warm water bottle, heating pad set on low, or warm cloth on your finger or toe. Do not go to sleep with a heating pad on your skin. · Soak the area in warm water twice a day for 15 minutes each time. After soaking, dry the area well and apply a thin layer of petroleum jelly, such as Vaseline. Put on a new bandage. When should you call for help? Call your doctor now or seek immediate medical care if:    · You have signs of new or worsening infection, such as:  ? Increased pain, swelling, warmth, or redness. ? Red streaks leading from the infected skin. ? Pus draining from the area. ? A fever.    Watch closely for changes in your health, and be sure to contact your doctor if:    · You do not get better as expected. Where can you learn more? Go to http://jonathon-margareth.info/. Enter C435 in the search box to learn more about \"Paronychia: Care Instructions. \"  Current as of: April 1, 2019  Content Version: 12.1  © 5930-1486 [employer], [employer]orporated. Care instructions adapted under license by [employer] (which disclaims liability or warranty for this information). If you have questions about a medical condition or this instruction, always ask your healthcare professional. [address] any warranty or liability for your use of this information.

## 2019-08-06 NOTE — PROGRESS NOTES
Identified pt with two pt identifiers(name and ). Chief Complaint   Patient presents with    Foot Swelling     Toe pain. right large toe. may be infected        Health Maintenance Due   Topic    Shingrix Vaccine Age 50> (1 of 2)    MEDICARE YEARLY EXAM     Influenza Age 5 to Adult     BREAST CANCER SCRN MAMMOGRAM        Wt Readings from Last 3 Encounters:   19 217 lb (98.4 kg)   19 219 lb (99.3 kg)   19 222 lb (100.7 kg)     Temp Readings from Last 3 Encounters:   19 98.4 °F (36.9 °C) (Oral)   19 98.1 °F (36.7 °C) (Oral)   19 98.1 °F (36.7 °C)     BP Readings from Last 3 Encounters:   19 (!) 138/98   19 128/80   19 142/75     Pulse Readings from Last 3 Encounters:   19 89   19 78   19 62         Learning Assessment:  :     Learning Assessment 2016 2015 2015 1/3/2014   PRIMARY LEARNER Patient Patient Patient Patient   HIGHEST LEVEL OF EDUCATION - PRIMARY LEARNER  - - - 4 Luis Antonio LEARNER - NONE - COGNITIVE   CO-LEARNER CAREGIVER - No - -   PRIMARY LANGUAGE ENGLISH ENGLISH ENGLISH ENGLISH   LEARNER PREFERENCE PRIMARY DEMONSTRATION DEMONSTRATION VIDEOS READING     - - - VIDEOS   ANSWERED BY patient  patient patient patient   RELATIONSHIP SELF SELF SELF SELF       Depression Screening:  :     3 most recent PHQ Screens 2018   Little interest or pleasure in doing things Nearly every day   Feeling down, depressed, irritable, or hopeless More than half the days   Total Score PHQ 2 5       Fall Risk Assessment:  :     Fall Risk Assessment, last 12 mths 2018   Able to walk? Yes   Fall in past 12 months? Yes   Fall with injury? Yes   Number of falls in past 12 months 5   Fall Risk Score 6       Abuse Screening:  :     Abuse Screening Questionnaire 2018 2015 3/10/2014   Do you ever feel afraid of your partner?  N N N   Are you in a relationship with someone who physically or mentally threatens you? N N N   Is it safe for you to go home? Y Y Y       Coordination of Care Questionnaire:  :     1) Have you been to an emergency room, urgent care clinic since your last visit? no   Hospitalized since your last visit? no             2) Have you seen or consulted any other health care providers outside of [address] [place] [address] since your last visit? no  (Include any pap smears or colon screenings in this section.)    3) Do you have an Advance Directive on file? no  Are you interested in receiving information about Advance Directives? no    Reviewed record in preparation for visit and have obtained necessary documentation. Medication reconciliation up to date and corrected with patient at this time.

## 2019-08-06 NOTE — PROGRESS NOTES
Subjective:      Nino Vieira is a 62 y.o. female here with complaint of right toe pain and concern for infection. Had traumatic event to the toenails around a year ago after dropping something on the feet. She has noticed drainage from the right great toenail for about 1 month. Drainage is yellow in color and intermittently bloody. Pain on the right medial nail fold which is throbbing in quality. She has been cleaning with hydrogen peroxide and treating with neosporin. No recent pedicures. Additional concerns:   - Would like refill of Zanaflex which she uses for back spasms.   - Has been having headaches for which ibuprofen has not been effective. Inquiring about something else to help. Current Outpatient Medications   Medication Sig Dispense Refill    tiZANidine (ZANAFLEX) 2 mg tablet Take 1 Tab by mouth nightly. Alternating with flexeril 90 Tab 1    ibuprofen (MOTRIN) 600 mg tablet TAKE 1 TABLET BY MOUTH EVERY 6 HOURS AS NEEDED FOR PAIN 90 Tab 1    nystatin (MYCOSTATIN) topical cream Apply  to affected area two (2) times daily as needed for Skin Irritation or Itching. 30 g 5    nystatin (MYCOSTATIN) powder Apply  to affected area four (4) times daily as needed (Skin irritation/itching). 60 g 5    DULoxetine (CYMBALTA) 60 mg capsule TAKE ONE CAPSULE BY MOUTH TWICE DAILY 180 Cap 1    scopolamine (TRANSDERM-SCOP) 1 mg over 3 days pt3d 1 Patch by TransDERmal route every seventy-two (72) hours. 10 Patch 2    ALPRAZolam (XANAX) 1 mg tablet TAKE 1 & 1/2 (ONE & ONE-HALF) TABLETS BY MOUTH TWICE DAILY AS NEEDED FOR 90 DAYS -  MAX  DAILY  AMOUNT  3MG 270 Tab 0    diphenhydrAMINE (BENADRYL) 25 mg capsule Take 50 mg by mouth nightly.  Calcium Carbonate-Vit D3-Min 600 mg calcium- 400 unit tab Take 1,200 Tabs by mouth Daily (before breakfast).  OTHER Take 2 Tabs by mouth two (2) times a day.  Medication call \"Leg Cramps\"  PTC taken 2x daily for severe leg cramps      lidocaine (LIDODERM) 5 % 3 Patches by TransDERmal route every twenty-four (24) hours. Apply patch to the affected area for 12 hours a day and remove for 12 hours a day. Applies 2 patches on mid and lower back, and 3rd patch on knee, whichever is most painful at the time.  FLUoxetine (PROZAC) 40 mg capsule Take 1 Cap by mouth daily. (Patient taking differently: Take 40 mg by mouth Daily (before breakfast). ) 90 Cap 1    montelukast (SINGULAIR) 10 mg tablet Take 1 Tab by mouth daily. (Patient taking differently: Take 10 mg by mouth Daily (before breakfast). Just perscribed by PCP for sinus infection, has not started as of yet. Instructed to begin today.) 90 Tab 1    naproxen (NAPROSYN) 500 mg tablet Take 1 Tab by mouth two (2) times daily (with meals). (Patient taking differently: Take 500 mg by mouth Daily (before breakfast). ) 60 Tab 5    gabapentin (NEURONTIN) 400 mg capsule Take 2 Caps by mouth three (3) times daily. 540 Cap 1    prenatal 74/MTOE fum/folic/dha (PRENATAL-1 PO) Take 1 Tab by mouth Daily (before breakfast).  cyanocobalamin (VITAMIN B-12) 1,000 mcg tablet Take 1,000 mcg by mouth Daily (before breakfast).  ondansetron (ZOFRAN ODT) 8 mg disintegrating tablet DISSOLVE 1 TABLET IN MOUTH EVERY 8 HOURS AS NEEDED FOR NAUSEA 20 Tab 3    promethazine (PHENERGAN) 25 mg tablet Take 25 mg by mouth every eight (8) hours as needed.  EPINEPHrine (EPIPEN) 0.3 mg/0.3 mL injection epinephrine 0.3 mg/0.3 mL injection, auto-injector         Allergies   Allergen Reactions    Bee Sting [Sting, Bee] Anaphylaxis    Morphine Other (comments)     Severe stomach cramps     Vicodin [Hydrocodone-Acetaminophen] Hives       Past Medical History:   Diagnosis Date    ADD (attention deficit disorder)     Anemia     Anxiety     Autoimmune disease (HCC)     fibromyalgia    Burn     Shoulder, back and buttocks  from use of heating pad.     CAD (coronary artery disease)     Chronic pain     Followed by Dr. Edwin Sy Chronic pain Fibromyalgia    Dysthymic disorder 11/9/2012    PTSD, memory loss short term /  dementia depression    Fibromyalgia     GERD (gastroesophageal reflux disease)     rarely    Herniated cervical disc     Ill-defined condition     lymphedema in legs    Ill-defined condition     dementia    Kidney stone     hx frequently    Lumbago     Lymphedema     uses compression stockings in daytime, flexitouch pump at night    Lymphedema     Memory loss, short term     MVP (mitral valve prolapse)     Nausea & vomiting     after general anesthesia    PCOS (polycystic ovarian syndrome)     PTSD (post-traumatic stress disorder)     Pulmonary embolism ([address] X2    Associated with surgery X2    Scoliosis     Sinus infection     saw PCP 3/15/19  started treatment 3/18/18    Stroke [facility])     april 2014    Sun-damaged skin     Sunburn, blistering     Tanning bed exposure     TIA on medication     TIA L hemiparesis - Was on diet pills 2015    TMJ (dislocation of temporomandibular joint)     Vitamin B 12 deficiency     resolved    Vitamin D deficiency        Social History     Tobacco Use    Smoking status: Never Smoker    Smokeless tobacco: Never Used    Tobacco comment: Never used vapor or e-cigs    Substance Use Topics    Alcohol use: No     Alcohol/week: 0.0 standard drinks        Review of Systems  Pertinent items are noted in HPI.      Objective:     Visit Vitals  BP (!) 138/98 (BP 1 Location: Right arm, BP Patient Position: Sitting) Comment: Manual   Pulse 89   Temp 98.4 °F (36.9 °C) (Oral) Comment: .   Resp 18   Ht 5' 5\" (1.651 m)   Wt 217 lb (98.4 kg)   LMP 07/15/2011   SpO2 99%   BMI 36.11 kg/m²      General appearance - alert, well appearing, and in no distress  Chest - clear to auscultation, no wheezes, rales or rhonchi, symmetric air entry, no tachypnea, retractions or cyanosis  Heart - normal rate, regular rhythm, normal S1, S2, no murmurs, rubs, clicks or gallops  Skin - swelling noted around the right great toenail with some drainage    Assessment/Plan:   Valencia Langford is a 62 y.o. female seen for:     1. Paronychia of great toe of right foot  - trimethoprim-sulfamethoxazole (BACTRIM DS, SEPTRA DS) 160-800 mg per tablet; Take 1 Tab by mouth two (2) times a day for 10 days. Dispense: 20 Tab; Refill: 0  - discussed warm Epsom salt soaks, may need to see Podiatry if no improvement noted     2. Lumbar paraspinal muscle spasm  - tiZANidine (ZANAFLEX) 2 mg tablet; Take 1 Tab by mouth three (3) times daily. Alternating with flexeril  Dispense: 90 Tab; Refill: 2    3. Intractable tension-type headache, unspecified chronicity pattern  - butalbital-acetaminophen-caff (FIORICET) -40 mg per capsule; Take 1 Cap by mouth every six (6) hours as needed for Headache. Dispense: 30 Cap; Refill: 0    I have discussed the diagnosis with the patient and the intended plan as seen in the above orders. The patient has received an after-visit summary and questions were answered concerning future plans. I have discussed medication side effects and warnings with the patient as well. Patient verbalizes understanding of plan of care and denies further questions or concerns at this time. Informed patient to return to the office if symptoms worsen or if new symptoms arise. Follow-up and Dispositions    · Return if symptoms worsen or fail to improve.

## 2019-08-08 ENCOUNTER — TELEPHONE (OUTPATIENT)
Dept: FAMILY MEDICINE CLINIC | Age: 59
End: 2019-08-08

## 2019-08-08 NOTE — TELEPHONE ENCOUNTER
Pt came in this week and needed to give the following information for a letter of necessity for pt to go back and see dr Lizzeth Smith  with Dr Naty Crespo stated pt has three herniated disc and three other conditions     Call pt at [phone]

## 2019-08-08 NOTE — TELEPHONE ENCOUNTER
3 herniated discs( L,3,4,5), scoliosis mid back, Sciatica bilateral, and Fibromyalgia.  Also letter needs to mention that patient was former long term patient of Dr. Severa Patella

## 2019-08-21 ENCOUNTER — TELEPHONE (OUTPATIENT)
Dept: FAMILY MEDICINE CLINIC | Age: 59
End: 2019-08-21

## 2019-08-21 DIAGNOSIS — I89.0 LYMPHEDEMA OF BOTH LOWER EXTREMITIES: Primary | ICD-10-CM

## 2019-08-21 NOTE — TELEPHONE ENCOUNTER
The pt is calling because she is wanting to know if  will fax over a request for \"Eval & treat\" to the [facility].  The fax number is [phone]

## 2019-08-26 DIAGNOSIS — T21.05XD: ICD-10-CM

## 2019-08-26 DIAGNOSIS — E78.00 HYPERCHOLESTEROLEMIA: ICD-10-CM

## 2019-08-26 DIAGNOSIS — K13.0 INTERTRIGO LABIALIS: ICD-10-CM

## 2019-08-27 ENCOUNTER — TELEPHONE (OUTPATIENT)
Dept: FAMILY MEDICINE CLINIC | Age: 59
End: 2019-08-27

## 2019-08-27 NOTE — TELEPHONE ENCOUNTER
The pt is calling to see if a order can be placed for a open MRI of her back to her buttock.      She also wants to know some dates of a a older MRI and mammogram.     Best call back number is [phone]

## 2019-08-28 NOTE — TELEPHONE ENCOUNTER
Pt called regarding several separate refill requests in patients chart. Pt states losartan is on back order, if something could be ordered in replacement.

## 2019-09-05 RX ORDER — SILVER SULFADIAZINE 10 G/1000G
CREAM TOPICAL
Qty: 50 G | Refills: 3 | Status: SHIPPED | OUTPATIENT
Start: 2019-09-05

## 2019-09-05 RX ORDER — NYSTATIN 100000 [USP'U]/G
POWDER TOPICAL
Qty: 60 G | Refills: 5 | Status: SHIPPED | OUTPATIENT
Start: 2019-09-05

## 2019-09-24 ENCOUNTER — HOSPITAL ENCOUNTER (OUTPATIENT)
Dept: MRI IMAGING | Age: 59
Discharge: HOME | End: 2019-09-24
Attending: PHYSICAL MEDICINE & REHABILITATION
Payer: MEDICARE

## 2019-09-24 DIAGNOSIS — M51.26 DISPLACEMENT OF LUMBAR INTERVERTEBRAL DISC WITHOUT MYELOPATHY: ICD-10-CM

## 2019-09-24 PROCEDURE — 72148 MRI LUMBAR SPINE W/O DYE: CPT

## 2019-10-15 ENCOUNTER — OFFICE VISIT (OUTPATIENT)
Dept: FAMILY MEDICINE CLINIC | Age: 59
End: 2019-10-15

## 2019-10-15 VITALS
RESPIRATION RATE: 18 BRPM | DIASTOLIC BLOOD PRESSURE: 82 MMHG | BODY MASS INDEX: 36.32 KG/M2 | HEIGHT: 65 IN | WEIGHT: 218 LBS | HEART RATE: 64 BPM | TEMPERATURE: 98.4 F | SYSTOLIC BLOOD PRESSURE: 132 MMHG | OXYGEN SATURATION: 97 %

## 2019-10-15 DIAGNOSIS — M54.50 CHRONIC MIDLINE LOW BACK PAIN WITHOUT SCIATICA: ICD-10-CM

## 2019-10-15 DIAGNOSIS — F33.2 SEVERE RECURRENT MAJOR DEPRESSION WITHOUT PSYCHOTIC FEATURES (HCC): ICD-10-CM

## 2019-10-15 DIAGNOSIS — F98.8 ATTENTION DEFICIT DISORDER, UNSPECIFIED HYPERACTIVITY PRESENCE: ICD-10-CM

## 2019-10-15 DIAGNOSIS — J01.40 ACUTE NON-RECURRENT PANSINUSITIS: Primary | ICD-10-CM

## 2019-10-15 DIAGNOSIS — G89.29 CHRONIC MIDLINE LOW BACK PAIN WITHOUT SCIATICA: ICD-10-CM

## 2019-10-15 DIAGNOSIS — F41.9 ANXIETY: ICD-10-CM

## 2019-10-15 DIAGNOSIS — J30.9 ALLERGIC RHINITIS, UNSPECIFIED SEASONALITY, UNSPECIFIED TRIGGER: ICD-10-CM

## 2019-10-15 RX ORDER — LOSARTAN POTASSIUM AND HYDROCHLOROTHIAZIDE 12.5; 5 MG/1; MG/1
1 TABLET ORAL DAILY
COMMUNITY
Start: 2019-09-02 | End: 2020-02-05

## 2019-10-15 RX ORDER — MELOXICAM 7.5 MG/1
1 TABLET ORAL 2 TIMES DAILY
COMMUNITY
Start: 2019-10-14

## 2019-10-15 RX ORDER — DEXTROAMPHETAMINE SACCHARATE, AMPHETAMINE ASPARTATE, DEXTROAMPHETAMINE SULFATE AND AMPHETAMINE SULFATE 7.5; 7.5; 7.5; 7.5 MG/1; MG/1; MG/1; MG/1
30 TABLET ORAL 2 TIMES DAILY
Qty: 60 TAB | Refills: 0 | Status: SHIPPED | OUTPATIENT
Start: 2019-12-14 | End: 2020-01-12

## 2019-10-15 RX ORDER — ALPRAZOLAM 1 MG/1
1.5 TABLET ORAL
Qty: 270 TAB | Refills: 0 | Status: SHIPPED | OUTPATIENT
Start: 2019-10-15 | End: 2020-01-31

## 2019-10-15 RX ORDER — TIZANIDINE 4 MG/1
4 TABLET ORAL 2 TIMES DAILY
COMMUNITY
End: 2019-11-12 | Stop reason: SDUPTHER

## 2019-10-15 RX ORDER — DEXTROAMPHETAMINE SACCHARATE, AMPHETAMINE ASPARTATE, DEXTROAMPHETAMINE SULFATE AND AMPHETAMINE SULFATE 7.5; 7.5; 7.5; 7.5 MG/1; MG/1; MG/1; MG/1
30 TABLET ORAL 2 TIMES DAILY
Qty: 60 TAB | Refills: 0 | Status: SHIPPED | OUTPATIENT
Start: 2019-11-14 | End: 2019-12-13

## 2019-10-15 RX ORDER — MONTELUKAST SODIUM 10 MG/1
10 TABLET ORAL DAILY
Qty: 90 TAB | Refills: 1 | Status: SHIPPED | OUTPATIENT
Start: 2019-10-15

## 2019-10-15 RX ORDER — DOXYCYCLINE 100 MG/1
100 CAPSULE ORAL 2 TIMES DAILY
Qty: 20 CAP | Refills: 0 | Status: SHIPPED | OUTPATIENT
Start: 2019-10-15 | End: 2019-10-25

## 2019-10-15 RX ORDER — DULOXETIN HYDROCHLORIDE 60 MG/1
CAPSULE, DELAYED RELEASE ORAL
Qty: 180 CAP | Refills: 1 | Status: SHIPPED | OUTPATIENT
Start: 2019-10-15

## 2019-10-15 RX ORDER — FLUOXETINE HYDROCHLORIDE 40 MG/1
40 CAPSULE ORAL DAILY
Qty: 90 CAP | Refills: 1 | Status: SHIPPED | OUTPATIENT
Start: 2019-10-15 | End: 2020-02-05 | Stop reason: SDUPTHER

## 2019-10-15 RX ORDER — DEXTROAMPHETAMINE SACCHARATE, AMPHETAMINE ASPARTATE, DEXTROAMPHETAMINE SULFATE AND AMPHETAMINE SULFATE 7.5; 7.5; 7.5; 7.5 MG/1; MG/1; MG/1; MG/1
30 TABLET ORAL 2 TIMES DAILY
Qty: 60 TAB | Refills: 0 | Status: SHIPPED | OUTPATIENT
Start: 2019-10-15 | End: 2019-11-13

## 2019-10-15 NOTE — PATIENT INSTRUCTIONS
Sinusitis: Care Instructions  Your Care Instructions    Sinusitis is an infection of the lining of the sinus cavities in your head. Sinusitis often follows a cold. It causes pain and pressure in your head and face. In most cases, sinusitis gets better on its own in 1 to 2 weeks. But some mild symptoms may last for several weeks. Sometimes antibiotics are needed. Follow-up care is a key part of your treatment and safety. Be sure to make and go to all appointments, and call your doctor if you are having problems. It's also a good idea to know your test results and keep a list of the medicines you take. How can you care for yourself at home? · Take an over-the-counter pain medicine, such as acetaminophen (Tylenol), ibuprofen (Advil, Motrin), or naproxen (Aleve). Read and follow all instructions on the label. · If the doctor prescribed antibiotics, take them as directed. Do not stop taking them just because you feel better. You need to take the full course of antibiotics. · Be careful when taking over-the-counter cold or flu medicines and Tylenol at the same time. Many of these medicines have acetaminophen, which is Tylenol. Read the labels to make sure that you are not taking more than the recommended dose. Too much acetaminophen (Tylenol) can be harmful. · Breathe warm, moist air from a steamy shower, a hot bath, or a sink filled with hot water. Avoid cold, dry air. Using a humidifier in your home may help. Follow the directions for cleaning the machine. · Use saline (saltwater) nasal washes to help keep your nasal passages open and wash out mucus and bacteria. You can buy saline nose drops at a grocery store or drugstore. Or you can make your own at home by adding 1 teaspoon of salt and 1 teaspoon of baking soda to 2 cups of distilled water. If you make your own, fill a bulb syringe with the solution, insert the tip into your nostril, and squeeze gently. Leon Leech your nose.   · Put a hot, wet towel or a warm gel pack on your face 3 or 4 times a day for 5 to 10 minutes each time. · Try a decongestant nasal spray like oxymetazoline (Afrin). Do not use it for more than 3 days in a row. Using it for more than 3 days can make your congestion worse. When should you call for help? Call your doctor now or seek immediate medical care if:    · You have new or worse swelling or redness in your face or around your eyes.     · You have a new or higher fever.    Watch closely for changes in your health, and be sure to contact your doctor if:    · You have new or worse facial pain.     · The mucus from your nose becomes thicker (like pus) or has new blood in it.     · You are not getting better as expected. Where can you learn more? Go to http://jonathon-margareth.info/. Enter T873 in the search box to learn more about \"Sinusitis: Care Instructions. \"  Current as of: October 21, 2018  Content Version: 12.2  © 6641-9769 [employer]. Care instructions adapted under license by [employer] (which disclaims liability or warranty for this information). If you have questions about a medical condition or this instruction, always ask your healthcare professional. [place] [address] any warranty or liability for your use of this information.

## 2019-10-15 NOTE — PROGRESS NOTES
Identified pt with two pt identifiers(name and ). Chief Complaint   Patient presents with    Toe Injury     had toe nails removed due to dropping railraod tie on them    Medication Refill     patient states she needs a refill of everything    Nasal Congestion        Health Maintenance Due   Topic    Shingrix Vaccine Age 50> (1 of 2)    MEDICARE YEARLY EXAM     Influenza Age 5 to Adult     BREAST CANCER SCRN MAMMOGRAM        Wt Readings from Last 3 Encounters:   10/15/19 218 lb (98.9 kg)   19 217 lb (98.4 kg)   19 219 lb (99.3 kg)     Temp Readings from Last 3 Encounters:   10/15/19 98.4 °F (36.9 °C) (Oral)   19 98.4 °F (36.9 °C) (Oral)   19 98.1 °F (36.7 °C) (Oral)     BP Readings from Last 3 Encounters:   10/15/19 132/82   19 (!) 138/98   19 128/80     Pulse Readings from Last 3 Encounters:   10/15/19 64   19 89   19 78         Learning Assessment:  :     Learning Assessment 2016 2015 2015 1/3/2014   PRIMARY LEARNER Patient Patient Patient Patient   HIGHEST LEVEL OF EDUCATION - PRIMARY LEARNER  - - - Melany Salmon LEARNER - NONE - COGNITIVE   CO-LEARNER CAREGIVER - No - -   PRIMARY LANGUAGE ENGLISH ENGLISH ENGLISH ENGLISH   LEARNER PREFERENCE PRIMARY DEMONSTRATION DEMONSTRATION VIDEOS READING     - - - VIDEOS   ANSWERED BY patient  patient patient patient   RELATIONSHIP SELF SELF SELF SELF       Depression Screening:  :     3 most recent PHQ Screens 2018   Little interest or pleasure in doing things Nearly every day   Feeling down, depressed, irritable, or hopeless More than half the days   Total Score PHQ 2 5       Fall Risk Assessment:  :     Fall Risk Assessment, last 12 mths 2018   Able to walk? Yes   Fall in past 12 months? Yes   Fall with injury?  Yes   Number of falls in past 12 months 5   Fall Risk Score 6       Abuse Screening:  :     Abuse Screening Questionnaire 2018 2015 3/10/2014   Do you ever feel afraid of your partner? N N N   Are you in a relationship with someone who physically or mentally threatens you? N N N   Is it safe for you to go home? Y Y Y       Coordination of Care Questionnaire:  :     1) Have you been to an emergency room, urgent care clinic since your last visit? no   Hospitalized since your last visit? no             2) Have you seen or consulted any other health care providers outside of [address] [place] [address] since your last visit? no  (Include any pap smears or colon screenings in this section.)    3) Do you have an Advance Directive on file? no  Are you interested in receiving information about Advance Directives? No    Reviewed record in preparation for visit and have obtained necessary documentation. Medication reconciliation up to date and corrected with patient at this time.

## 2019-10-15 NOTE — PROGRESS NOTES
Subjective:      Trever Rehman is a 62 y.o. female who presents for evaluation of possible sinus infection and medication refills. . Symptoms include coryza, nasal congestion and sinus pressure with no fever, chills, or night sweats. Onset of symptoms was several weeks ago, unchanged since that time. Evaluation to date: none  Treatment to date: OTC allergy medication, decongestant    Anxiety: on Prozac and alprazolam and she reports that she taking at least nightly. Last refill was 4/29/19. ADD: states that she does have issues with focus and concentration. Currently on Adderall 30 mg which she does not take daily per her report. Last refill 1/25/19. Denies chest pain or discomfort, insomnia with medication use. Current Outpatient Medications   Medication Sig Dispense Refill    losartan-hydroCHLOROthiazide (HYZAAR) 50-12.5 mg per tablet Take 1 Tab by mouth daily.  meloxicam (MOBIC) 7.5 mg tablet Take 1 Tab by mouth two (2) times a day.  tiZANidine (ZANAFLEX) 4 mg tablet Take 4 mg by mouth two (2) times a day.  SSD 1 % topical cream APPLY TO AFFECTED AREA DAILY 50 g 3    NYSTOP powder APPLY  POWDER TOPICALLY 4 TIMES DAILY TO AFFECTED AREA FOR 30 DAYS 60 g 5    ondansetron (ZOFRAN ODT) 8 mg disintegrating tablet DISSOLVE 1 TABLET IN MOUTH EVERY 8 HOURS AS NEEDED FOR NAUSEA 20 Tab 3    ibuprofen (MOTRIN) 600 mg tablet TAKE 1 TABLET BY MOUTH EVERY 6 HOURS AS NEEDED FOR PAIN 90 Tab 1    nystatin (MYCOSTATIN) topical cream Apply  to affected area two (2) times daily as needed for Skin Irritation or Itching. 30 g 5    DULoxetine (CYMBALTA) 60 mg capsule TAKE ONE CAPSULE BY MOUTH TWICE DAILY 180 Cap 1    scopolamine (TRANSDERM-SCOP) 1 mg over 3 days pt3d 1 Patch by TransDERmal route every seventy-two (72) hours.  10 Patch 2    ALPRAZolam (XANAX) 1 mg tablet TAKE 1 & 1/2 (ONE & ONE-HALF) TABLETS BY MOUTH TWICE DAILY AS NEEDED FOR 90 DAYS -  MAX  DAILY  AMOUNT  3MG 270 Tab 0    diphenhydrAMINE (BENADRYL) 25 mg capsule Take 50 mg by mouth nightly.  Calcium Carbonate-Vit D3-Min 600 mg calcium- 400 unit tab Take 1,200 Tabs by mouth Daily (before breakfast).  OTHER Take 2 Tabs by mouth two (2) times a day. Medication call \"Leg Cramps\"  PTC taken 2x daily for severe leg cramps      lidocaine (LIDODERM) 5 % 3 Patches by TransDERmal route every twenty-four (24) hours. Apply patch to the affected area for 12 hours a day and remove for 12 hours a day. Applies 2 patches on mid and lower back, and 3rd patch on knee, whichever is most painful at the time.  FLUoxetine (PROZAC) 40 mg capsule Take 1 Cap by mouth daily. (Patient taking differently: Take 40 mg by mouth Daily (before breakfast). ) 90 Cap 1    montelukast (SINGULAIR) 10 mg tablet Take 1 Tab by mouth daily. (Patient taking differently: Take 10 mg by mouth Daily (before breakfast). Just perscribed by PCP for sinus infection, has not started as of yet. Instructed to begin today.) 90 Tab 1    gabapentin (NEURONTIN) 400 mg capsule Take 2 Caps by mouth three (3) times daily. 540 Cap 1    prenatal 42/YMAF fum/folic/dha (PRENATAL-1 PO) Take 1 Tab by mouth Daily (before breakfast).  promethazine (PHENERGAN) 25 mg tablet Take 25 mg by mouth every eight (8) hours as needed.  EPINEPHrine (EPIPEN) 0.3 mg/0.3 mL injection epinephrine 0.3 mg/0.3 mL injection, auto-injector         Allergies   Allergen Reactions    Bee Sting [Sting, Bee] Anaphylaxis    Morphine Other (comments)     Severe stomach cramps     Vicodin [Hydrocodone-Acetaminophen] Hives       Past Medical History:   Diagnosis Date    ADD (attention deficit disorder)     Anemia     Anxiety     Autoimmune disease (HCC)     fibromyalgia    Burn     Shoulder, back and buttocks  from use of heating pad.     CAD (coronary artery disease)     Chronic pain     Followed by Dr. Pancho Francisco Chronic pain     Fibromyalgia    Dysthymic disorder 11/9/2012    PTSD, memory loss short term /  dementia depression    Fibromyalgia     GERD (gastroesophageal reflux disease)     rarely    Herniated cervical disc     Ill-defined condition     lymphedema in legs    Ill-defined condition     dementia    Kidney stone     hx frequently    Lumbago     Lymphedema     uses compression stockings in daytime, flexitouch pump at night    Lymphedema     Memory loss, short term     MVP (mitral valve prolapse)     Nausea & vomiting     after general anesthesia    PCOS (polycystic ovarian syndrome)     PTSD (post-traumatic stress disorder)     Pulmonary embolism ([address] X2    Associated with surgery X2    Scoliosis     Sinus infection     saw PCP 3/15/19  started treatment 3/18/18    Stroke [facility])     april 2014    Sun-damaged skin     Sunburn, blistering     Tanning bed exposure     TIA on medication     TIA L hemiparesis - Was on diet pills 2015    TMJ (dislocation of temporomandibular joint)     Vitamin B 12 deficiency     resolved    Vitamin D deficiency        Social History     Tobacco Use    Smoking status: Never Smoker    Smokeless tobacco: Never Used    Tobacco comment: Never used vapor or e-cigs    Substance Use Topics    Alcohol use: No     Alcohol/week: 0.0 standard drinks          Review of Systems  Pertinent items are noted in HPI.      Objective:     Visit Vitals  /82 (BP 1 Location: Right arm, BP Patient Position: Sitting) Comment: Manual   Pulse 64   Temp 98.4 °F (36.9 °C) (Oral) Comment: .   Resp 18   Ht 5' 5\" (1.651 m)   Wt 218 lb (98.9 kg)   LMP 07/15/2011   SpO2 97%   BMI 36.28 kg/m²      General appearance - alert, well appearing, and in no distress  Eyes - pupils equal and reactive, extraocular eye movements intact, sclera anicteric  Ears - bilateral TM's and external ear canals normal  Nose - normal nontender sinuses, mucosal congestion and sinus tenderness noted  Mouth - mucous membranes moist, pharynx normal without lesions  Neck - supple, no significant adenopathy  Chest - clear to auscultation, no wheezes, rales or rhonchi, symmetric air entry, no tachypnea, retractions or cyanosis  Heart - normal rate, regular rhythm, normal S1, S2, no murmurs, rubs, clicks or gallops    Assessment/Plan:   Luis Alberto Rivas is a 62 y.o. female seen for:     1. Acute non-recurrent pansinusitis  - doxycycline (VIBRAMYCIN) 100 mg capsule; Take 1 Cap by mouth two (2) times a day for 10 days. Dispense: 20 Cap; Refill: 0  - nasal saline sprays as needed for congestion, warm compresses to sinuses, OTC analgesic of choice for pain    2. Attention deficit disorder, unspecified hyperactivity presence  - dextroamphetamine-amphetamine (ADDERALL) 30 mg tablet; Take 1 Tab by mouth two (2) times a day for 29 days. Max Daily Amount: 2 Tabs. Dispense: 60 Tab; Refill: 0  - dextroamphetamine-amphetamine (ADDERALL) 30 mg tablet; Take 1 Tab by mouth two (2) times a day for 29 days. Max Daily Amount: 2 Tabs. Dispense: 60 Tab; Refill: 0  - dextroamphetamine-amphetamine (ADDERALL) 30 mg tablet; Take 1 Tab by mouth two (2) times a day for 29 days. Max Daily Amount: 2 Tabs. Dispense: 60 Tab; Refill: 0    3. Anxiety: stable. - ALPRAZolam (XANAX) 1 mg tablet; Take 1.5 Tabs by mouth two (2) times daily as needed for Anxiety or Sleep. Max Daily Amount: 3 mg. Dispense: 270 Tab; Refill: 0    4. Severe recurrent major depression without psychotic features ([facility] [address]): stable. - FLUoxetine (PROZAC) 40 mg capsule; Take 1 Cap by mouth daily. Dispense: 90 Cap; Refill: 1    5. Chronic midline low back pain without sciatica: has seen Pain Management and has had PAMELA, SI and facet joint blocks with Dr. Myrtle Otto. - DULoxetine (CYMBALTA) 60 mg capsule; TAKE ONE CAPSULE BY MOUTH TWICE DAILY  Dispense: 180 Cap; Refill: 1    6. Allergic rhinitis, unspecified seasonality, unspecified trigger: stable. - montelukast (SINGULAIR) 10 mg tablet; Take 1 Tab by mouth daily. Dispense: 90 Tab;  Refill: 1    I have discussed the diagnosis with the patient and the intended plan as seen in the above orders. The patient has received an after-visit summary and questions were answered concerning future plans. I have discussed medication side effects and warnings with the patient as well. Informed patient to return to the office if symptoms worsen or if new symptoms arise. Follow-up and Dispositions    · Return in about 3 months (around 1/15/2020).

## 2019-10-28 ENCOUNTER — TELEPHONE (OUTPATIENT)
Dept: FAMILY MEDICINE CLINIC | Age: 59
End: 2019-10-28

## 2019-10-28 ENCOUNTER — HOSPITAL ENCOUNTER (OUTPATIENT)
Dept: GENERAL RADIOLOGY | Age: 59
Discharge: HOME | End: 2019-10-28
Attending: PHYSICIAN ASSISTANT
Payer: MEDICARE

## 2019-10-28 DIAGNOSIS — M41.9 SCOLIOSIS: ICD-10-CM

## 2019-10-28 PROCEDURE — 72072 X-RAY EXAM THORAC SPINE 3VWS: CPT

## 2019-10-28 NOTE — TELEPHONE ENCOUNTER
Forwarded from call center. .. Best contact number(s): [phone]   Details to clarify the request: Pt requesting Ronal Minors to give her a call back.

## 2019-10-28 NOTE — TELEPHONE ENCOUNTER
Patient was checking on medications for family members. Relayed all necessary information. Patient verbalized understanding.

## 2019-11-05 DIAGNOSIS — M54.50 CHRONIC MIDLINE LOW BACK PAIN WITHOUT SCIATICA: ICD-10-CM

## 2019-11-05 DIAGNOSIS — G89.29 CHRONIC MIDLINE LOW BACK PAIN WITHOUT SCIATICA: ICD-10-CM

## 2019-11-12 DIAGNOSIS — J01.40 ACUTE NON-RECURRENT PANSINUSITIS: ICD-10-CM

## 2019-11-12 DIAGNOSIS — M62.830 LUMBAR PARASPINAL MUSCLE SPASM: ICD-10-CM

## 2019-11-12 RX ORDER — TIZANIDINE 2 MG/1
TABLET ORAL
Qty: 90 TAB | Refills: 2 | Status: SHIPPED | OUTPATIENT
Start: 2019-11-12 | End: 2019-12-20 | Stop reason: SDUPTHER

## 2019-11-12 RX ORDER — DOXYCYCLINE 100 MG/1
CAPSULE ORAL
Qty: 20 CAP | Refills: 0 | OUTPATIENT
Start: 2019-11-12

## 2019-11-12 RX ORDER — GABAPENTIN 400 MG/1
800 CAPSULE ORAL 3 TIMES DAILY
Qty: 540 CAP | Refills: 1 | Status: SHIPPED | OUTPATIENT
Start: 2019-11-12

## 2019-11-13 DIAGNOSIS — G89.29 CHRONIC MIDLINE LOW BACK PAIN WITHOUT SCIATICA: ICD-10-CM

## 2019-11-13 DIAGNOSIS — M54.50 CHRONIC MIDLINE LOW BACK PAIN WITHOUT SCIATICA: ICD-10-CM

## 2019-11-19 ENCOUNTER — HOSPITAL ENCOUNTER (OUTPATIENT)
Dept: GENERAL RADIOLOGY | Age: 59
Discharge: HOME | End: 2019-11-19
Attending: PHYSICIAN ASSISTANT
Payer: MEDICARE

## 2019-11-19 DIAGNOSIS — M41.9 SCOLIOSIS: ICD-10-CM

## 2019-11-19 PROCEDURE — 72072 X-RAY EXAM THORAC SPINE 3VWS: CPT

## 2019-11-26 NOTE — TELEPHONE ENCOUNTER
----- Message from Benny Enriquez sent at 11/26/2019  7:26 AM EST -----  Regarding: Dr. Kristi Arvizu (if not patient):      Relationship of caller (if not patient):      Best contact number(s): [phone]      Name of medication and dosage if known: Losartan Hydrochlorot 12.5mg       Is patient out of this medication (yes/no): yes      Pharmacy name: [address] [place] [address] listed in chart? (yes/no): file  Pharmacy phone number: [phone]      Details to clarify the request: Pt is completley out of Losartan and Hydrochlorot 12.5 mg and needs it called into her [address] [place] [address]

## 2019-12-17 RX ORDER — ONDANSETRON 8 MG/1
TABLET, ORALLY DISINTEGRATING ORAL
Qty: 30 TAB | Refills: 3 | Status: SHIPPED | OUTPATIENT
Start: 2019-12-17 | End: 2020-01-22

## 2019-12-18 ENCOUNTER — HOSPITAL ENCOUNTER (OUTPATIENT)
Dept: LAB | Age: 59
Discharge: HOME | End: 2019-12-18
Payer: MEDICARE

## 2019-12-18 PROCEDURE — 86765 RUBEOLA ANTIBODY: CPT

## 2019-12-19 LAB
MEV IGG SER IA-ACNC: 176 AU/ML
MUV IGG SER IA-ACNC: 51.7 AU/ML
RUBV IGG SERPL IA-ACNC: 4.28 INDEX

## 2019-12-20 ENCOUNTER — OFFICE VISIT (OUTPATIENT)
Dept: FAMILY MEDICINE CLINIC | Age: 59
End: 2019-12-20

## 2019-12-20 VITALS
BODY MASS INDEX: 38.15 KG/M2 | HEART RATE: 84 BPM | OXYGEN SATURATION: 97 % | SYSTOLIC BLOOD PRESSURE: 129 MMHG | RESPIRATION RATE: 17 BRPM | HEIGHT: 65 IN | DIASTOLIC BLOOD PRESSURE: 89 MMHG | TEMPERATURE: 98.3 F | WEIGHT: 229 LBS

## 2019-12-20 DIAGNOSIS — F34.1 DYSTHYMIC DISORDER: ICD-10-CM

## 2019-12-20 DIAGNOSIS — M62.830 LUMBAR PARASPINAL MUSCLE SPASM: ICD-10-CM

## 2019-12-20 DIAGNOSIS — I89.0 LYMPHEDEMA: ICD-10-CM

## 2019-12-20 DIAGNOSIS — M54.50 CHRONIC BILATERAL LOW BACK PAIN WITHOUT SCIATICA: ICD-10-CM

## 2019-12-20 DIAGNOSIS — R11.0 NAUSEA: Primary | ICD-10-CM

## 2019-12-20 DIAGNOSIS — G89.29 CHRONIC BILATERAL LOW BACK PAIN WITHOUT SCIATICA: ICD-10-CM

## 2019-12-20 PROBLEM — M25.562 ACUTE PAIN OF LEFT KNEE: Status: RESOLVED | Noted: 2017-03-06 | Resolved: 2019-12-20

## 2019-12-20 PROBLEM — R35.0 URINARY FREQUENCY: Status: RESOLVED | Noted: 2017-02-23 | Resolved: 2019-12-20

## 2019-12-20 PROBLEM — J01.00 ACUTE NON-RECURRENT MAXILLARY SINUSITIS: Status: RESOLVED | Noted: 2017-02-21 | Resolved: 2019-12-20

## 2019-12-20 RX ORDER — PROMETHAZINE HYDROCHLORIDE 25 MG/1
25 TABLET ORAL
Qty: 120 TAB | Refills: 1 | Status: SHIPPED | OUTPATIENT
Start: 2019-12-20 | End: 2020-09-02

## 2019-12-20 RX ORDER — TIZANIDINE 2 MG/1
TABLET ORAL
Qty: 90 TAB | Refills: 2 | Status: SHIPPED | OUTPATIENT
Start: 2019-12-20 | End: 2020-02-05 | Stop reason: SDUPTHER

## 2019-12-20 NOTE — PROGRESS NOTES
Chief Complaint   Patient presents with   [address] [place] [address]     1. Have you been to the ER, urgent care clinic since your last visit? Hospitalized since your last visit? no    2. Have you seen or consulted any other health care providers outside of the [address] [place] [address] since your last visit? Include any pap smears or colon screening.        Med refills of zofran/promethazine/tizanidine    Chronic lower back issues and scoliosis    Lymphedema--trial at [facility]--legs    PTSD/short term memory loss Problem: Overdose, Ingestion/Inhalants (Adult)  Goal: Signs and Symptoms of Listed Potential Problems Will be Absent, Minimized or Managed (Overdose, Ingestion/Inhalants)  Signs and symptoms of listed potential problems will be absent, minimized or managed by discharge/transition of care (reference Overdose, Ingestion/Inhalants (Adult) CPG).   CK improving to 2015 today. Continues on IVF at 200cc/hr. Requesting Valium for mild tremor and anxiety.

## 2019-12-20 NOTE — PROGRESS NOTES
Chief Complaint: establish care for multiple chronic diseases   Source: self     Subjective  Rosalio Shah is an 61 y.o. female who presents to establish care for:    Chronic Low Back Pain 2/2 Scoliosis and 4 herniated discs in low back  - has had multiple injections without relief of pain  - previously on oxycotin which gave relief of back pain  - saw Dr Orvil Cogan for 8 years then seen by Dr Julien Wren ([facility]) ended relationship over the summer   - Now sees Dr Valerie Chacon for injections; next appointment in 1/6/19    Lymphedema  - enrolled in trial study at [facility] for lymphedema  - particularly severe in right knee   - uses pump for 6 hours every night and compression devices   - has caused some falls  - unable to have knee replacement on right 2/2 lymphedema   - previously followed at lymphedema clinics at [facility] and [address] [place] [address] and phenergan daily as needed  - recent episode of nausea and vomiting started a few weeks ago  - hx of gastric bypass 12 years ago  - has cravings currently for grapes and pretzels and chocolate   - eating whoppers daily as well   - typically vomiting entire meal other than foods listed above       Panic Attacks in Setting of PTSD and Insomnia   - Currently having regular panic attacks   - previously no panic attacks for over one year   - Taking Xanax twice a night to fall asleep and to get back to sleep along with muscle relaxer and phenergan   - previously on adderall but that is not helpful; was seen by Dr Usman Roca who did not think Adderall would be helpful however was continuing to get Rx   - has seen four psychiatrists; was referred to a trauma specialists at [facility] but has been on waiting list for 2 years   - has seen therapists in [facility] as well; the last of which she was for one year but not currently going to therapy       Allergies - reviewed:    Allergies   Allergen Reactions    Bee Sting [Sting, Bee] Anaphylaxis    Morphine Other (comments) Severe stomach cramps     Vicodin [Hydrocodone-Acetaminophen] Hives         Medications - reviewed:   Current Outpatient Medications   Medication Sig    promethazine (PHENERGAN) 25 mg tablet Take 1 Tab by mouth every eight (8) hours as needed for Nausea.  tiZANidine (ZANAFLEX) 2 mg tablet TAKE 1 TABLET BY MOUTH THREE TIMES DAILY **ALTERNATING  WITH  FLEXERIL**    ondansetron (ZOFRAN ODT) 8 mg disintegrating tablet DISSOLVE 1 TABLET IN MOUTH EVERY 8 HOURS AS NEEDED FOR NAUSEA    gabapentin (NEURONTIN) 400 mg capsule Take 2 Caps by mouth three (3) times daily.  losartan-hydroCHLOROthiazide (HYZAAR) 50-12.5 mg per tablet Take 1 Tab by mouth daily.  meloxicam (MOBIC) 7.5 mg tablet Take 1 Tab by mouth two (2) times a day.  FLUoxetine (PROZAC) 40 mg capsule Take 1 Cap by mouth daily.  DULoxetine (CYMBALTA) 60 mg capsule TAKE ONE CAPSULE BY MOUTH TWICE DAILY    montelukast (SINGULAIR) 10 mg tablet Take 1 Tab by mouth daily.  ALPRAZolam (XANAX) 1 mg tablet Take 1.5 Tabs by mouth two (2) times daily as needed for Anxiety or Sleep. Max Daily Amount: 3 mg.  dextroamphetamine-amphetamine (ADDERALL) 30 mg tablet Take 1 Tab by mouth two (2) times a day for 29 days. Max Daily Amount: 2 Tabs.  SSD 1 % topical cream APPLY TO AFFECTED AREA DAILY    NYSTOP powder APPLY  POWDER TOPICALLY 4 TIMES DAILY TO AFFECTED AREA FOR 30 DAYS    diphenhydrAMINE (BENADRYL) 25 mg capsule Take 50 mg by mouth nightly.  Calcium Carbonate-Vit D3-Min 600 mg calcium- 400 unit tab Take 1,200 Tabs by mouth Daily (before breakfast).  OTHER Take 2 Tabs by mouth two (2) times a day. Medication call \"Leg Cramps\"  PTC taken 2x daily for severe leg cramps    lidocaine (LIDODERM) 5 % 3 Patches by TransDERmal route every twenty-four (24) hours. Apply patch to the affected area for 12 hours a day and remove for 12 hours a day.  Applies 2 patches on mid and lower back, and 3rd patch on knee, whichever is most painful at the time.    prenatal 07/IBZW fum/folic/dha (PRENATAL-1 PO) Take 1 Tab by mouth Daily (before breakfast).  EPINEPHrine (EPIPEN) 0.3 mg/0.3 mL injection epinephrine 0.3 mg/0.3 mL injection, auto-injector    ibuprofen (MOTRIN) 600 mg tablet TAKE 1 TABLET BY MOUTH EVERY 6 HOURS AS NEEDED FOR PAIN    nystatin (MYCOSTATIN) topical cream Apply  to affected area two (2) times daily as needed for Skin Irritation or Itching.  scopolamine (TRANSDERM-SCOP) 1 mg over 3 days pt3d 1 Patch by TransDERmal route every seventy-two (72) hours. No current facility-administered medications for this visit. Past Medical History - reviewed:  Past Medical History:   Diagnosis Date    ADD (attention deficit disorder)     Anemia     Anxiety     Autoimmune disease ([facility] [address]     fibromyalgia    Burn     Shoulder, back and buttocks  from use of heating pad.     CAD (coronary artery disease)     MI in 1994    Chronic pain     Followed by Dr. Genesis Patterson Chronic pain     Fibromyalgia    Dysthymic disorder 11/9/2012    PTSD, memory loss short term /  dementia depression    Fibromyalgia     GERD (gastroesophageal reflux disease)     rarely    Herniated cervical disc     Ill-defined condition     lymphedema in legs    Ill-defined condition     dementia    Kidney stone     hx frequently    Lumbago     Lymphedema     uses compression stockings in daytime, flexitouch pump at night    Lymphedema     Memory loss 1/17/2011    Memory loss, short term     MVP (mitral valve prolapse)     Nausea & vomiting     after general anesthesia    PCOS (polycystic ovarian syndrome)     PTSD (post-traumatic stress disorder)     Pulmonary embolism ([facility] [address] X2    Associated with surgery X2    Scoliosis     Sinus infection     saw PCP 3/15/19  started treatment 3/18/18    Stroke [facility])     april 2014    Sun-damaged skin     Sunburn, blistering     Tanning bed exposure     TIA on medication     TIA L hemiparesis - Was on diet pills 2015  TMJ (dislocation of temporomandibular joint)     Vitamin B 12 deficiency     resolved    Vitamin D deficiency          Past Surgical History - reviewed:   Past Surgical History:   Procedure Laterality Date    CARDIAC SURG PROCEDURE UNLIST      catheterization - heart attack, mitral valve prolapse    COLONOSCOPY N/A 3/15/2019    COLONOSCOPY-no info to  performed by Lacie Sher MD at [address] [facility] HX BREAST AUGMENTATION Bilateral     [facility]    , PCOS    HX DILATION AND CURETTAGE  2019    H/S, D+C with myosure--normal path with endometrial polyp    HX GASTRIC BYPASS  1999    [facility]: open gastric bypass, naseem,  umb hernia repair, tr. vagotomy    HX ORTHOPAEDIC  [id number],    bunion surgery, heel spur surgery    HX TONSILLECTOMY      As a child          Social History - reviewed:  Social History     Socioeconomic History    Marital status:      Spouse name: Not on file    Number of children: Not on file    Years of education: Not on file    Highest education level: Not on file   Occupational History    Not on file   Social Needs    Financial resource strain: Not on file    Food insecurity:     Worry: Not on file     Inability: Not on file    Transportation needs:     Medical: Not on file     Non-medical: Not on file   Tobacco Use    Smoking status: Never Smoker    Smokeless tobacco: Never Used    Tobacco comment: Never used vapor or e-cigs    Substance and Sexual Activity    Alcohol use: No     Alcohol/week: 0.0 standard drinks    Drug use: No    Sexual activity: Not Currently     Partners: Male     Birth control/protection: None     Comment:    Lifestyle    Physical activity:     Days per week: Not on file     Minutes per session: Not on file    Stress: Not on file   Relationships    Social connections:     Talks on phone: Not on file     Gets together: Not on file     Attends Mosque service: Not on file Active member of club or organization: Not on file     Attends meetings of clubs or organizations: Not on file     Relationship status: Not on file    Intimate partner violence:     Fear of current or ex partner: Not on file     Emotionally abused: Not on file     Physically abused: Not on file     Forced sexual activity: Not on file   Other Topics Concern     Service No    Blood Transfusions Yes     Comment: unknown    Caffeine Concern No    Occupational Exposure No    Hobby Hazards No    Sleep Concern Yes    Stress Concern Yes    Weight Concern Yes    Special Diet No    Back Care No    Exercise No    Bike Helmet No    Seat Belt Yes    Self-Exams Yes   Social History Narrative    Not on file         Family History - reviewed:  Family History   Problem Relation Age of Onset    Hypertension Mother     Ovarian Cancer Mother         Spread to Throat and Martin Bold Mother 61        unknown type    Diabetes Father     Stroke Father     Hypertension Father     Breast Cancer Sister     Breast Cancer Maternal Grandmother         Passed away from COPD - Former smoker     COPD Maternal Grandmother     Cancer Maternal Grandmother 79        colon         Immunizations - reviewed:   Immunization History   Administered Date(s) Administered    Influenza Vaccine (Quad) 10/21/2016, 08/01/2017, 10/22/2018    Influenza Vaccine (Quad) PF 10/14/2019    Influenza Vaccine PF 11/13/2013    Influenza Vaccine Whole 10/01/2012    Tdap 06/30/2015, 08/01/2017       Review of Systems   HENT: Negative for congestion and sinus pain. Eyes: Negative for blurred vision and double vision. Respiratory: Negative for cough and wheezing. Cardiovascular: Positive for leg swelling. Negative for chest pain and palpitations. Gastrointestinal: Positive for nausea. Negative for abdominal pain and vomiting. Musculoskeletal: Positive for back pain and falls. Negative for joint pain, myalgias and neck pain. Skin: Negative for itching and rash. Neurological: Negative for dizziness, focal weakness, weakness and headaches. Psychiatric/Behavioral: Negative for depression and suicidal ideas. The patient has insomnia. The patient is not nervous/anxious. Physical Exam  Visit Vitals  /89 (BP 1 Location: Right arm, BP Patient Position: Sitting)   Pulse 84   Temp 98.3 °F (36.8 °C) (Oral)   Resp 17   Ht 5' 5\" (1.651 m)   Wt 229 lb (103.9 kg)   LMP 07/15/2011   SpO2 97%   BMI 38.11 kg/m²       Physical Exam  Constitutional:       General: She is not in acute distress. Appearance: She is obese. She is not toxic-appearing. HENT:      Head: Normocephalic and atraumatic. Cardiovascular:      Rate and Rhythm: Normal rate and regular rhythm. Pulses: Normal pulses. Heart sounds: Normal heart sounds. No murmur. Pulmonary:      Effort: Pulmonary effort is normal. No respiratory distress. Breath sounds: Normal breath sounds. No stridor. No wheezing, rhonchi or rales. Chest:      Chest wall: No tenderness. Musculoskeletal:      Comments: Significant bilateral nonpitting edema to above the knee without erythema, warmth   Neurological:      Mental Status: She is alert and oriented to person, place, and time. Gait: Gait normal.   Psychiatric:         Attention and Perception: Attention normal.         Mood and Affect: Mood normal.         Speech: Speech is tangential.         Behavior: Behavior is not agitated. Behavior is cooperative. Thought Content: Thought content normal.      Comments: Rapid speech and jumps from topic to topic. Assessment/Plan    ICD-10-CM ICD-9-CM    1. Nausea R11.0 787.02 promethazine (PHENERGAN) 25 mg tablet   2. Lumbar paraspinal muscle spasm M62.830 724.8 tiZANidine (ZANAFLEX) 2 mg tablet   3. Chronic bilateral low back pain without sciatica M54.5 724.2 REFERRAL TO ORTHOPEDICS    G89.29 338.29    4. Lymphedema I89.0 457.1    5.  Dysthymic disorder F34.1 300.4        Jina Hurt is an 61 y.o. female with hx of lymphedema, chronic pain, unspecified psychiatric disorder and chronic nausea presenting to establish care for these issues     1. Lumbar paraspinal muscle spasm  - tiZANidine (ZANAFLEX) 2 mg tablet; TAKE 1 TABLET BY MOUTH THREE TIMES DAILY **ALTERNATING  WITH  FLEXERIL**  Dispense: 90 Tab; Refill: 2    2. Nausea: unclear etiology though occurs in spurts. Given psych history concern for psychiatric component and patient with hx of gastric bypass and eating fast food which also may be the etiology as may be consuming too much food and/or too high of fat content given history. Has gained weight over period of time since recent episode started  - promethazine (PHENERGAN) 25 mg tablet; Take 1 Tab by mouth every eight (8) hours as needed for Nausea. Dispense: 120 Tab; Refill: 1  - consider referral to GI for persistent issues. 3. Chronic bilateral low back pain without sciatica: hx of DDD and severe Left foraminal stenosis at L5-S1 with osteophyte formation. Needs referral to ortho for consideration of further interventions  - REFERRAL TO ORTHOPEDICS    4. Lymphedema  - Continue with current enrollment in [facility] study    5. Dysthymic disorder  - patient has followup in family stress clinic January 7; needs to establish care with therapist she trusts given multiple chronic issues and chronic pain        Follow-up and Dispositions    · Return in about 1 month (around 1/20/2020) for medicare wellness . I have discussed the diagnosis with the patient and the intended plan as seen in the above orders. Patient verbalized understanding of the plan and agrees with the plan. The patient has received an after-visit summary and questions were answered concerning future plans. I have discussed medication side effects and warnings with the patient as well. Informed patient to return to the office if new symptoms arise.     Patient discussed with Dr. Elana Dunn Kaleb Sharma MD supervising physician.     Vinayak Billingsley PGY2  Family Medicine Resident

## 2019-12-26 NOTE — PROGRESS NOTES
This is an Initial Medicare Annual Wellness Exam (AWV) (Performed 12 months after IPPE or effective date of Medicare Part B enrollment, Once in a lifetime)    I have reviewed the patient's medical history in detail and updated the computerized patient record. History     Patient Active Problem List   Diagnosis Code    Chronic pain G89.29    TMJ disorder     PCOS (polycystic ovarian syndrome) E28.2    Personal history of gastric bypass Z98.84    Chronic edema R60.9    Acid reflux K21.9    Sebaceous gland hyperplasia L73.8    Borderline diabetes mellitus R73.03    Morbid obesity ([address] E66.01    Scoliosis of lumbar spine M41.9    Dysthymic disorder F34.1    Attention deficit disorder F98.8    Anxiety state, unspecified F41.1    Mixed hyperlipidemia E78.2    Vitamin D deficiency E55.9    Nonspecific elevation of levels of transaminase or lactic acid dehydrogenase (LDH) R74.0    Leg swelling M79.89    Anxiety F41.9    Falls W19. XXXA    SOB (shortness of breath) R06.02    Chest pain R07.9    Joint pain M25.50    Leg cramps R25.2    UTI (lower urinary tract infection) N39.0    MRSA (methicillin resistant staph aureus) culture positive Z22.322    Itchy eyes R68.89    Nail fungus B35.1    Toenail fungus B35.1    Nausea R11.0    Memory changes R41.3     Past Medical History:   Diagnosis Date    ADD (attention deficit disorder)     Anemia     Anxiety     Autoimmune disease (HCC)     fibromyalgia    Burn     Shoulder, back and buttocks  from use of heating pad.     CAD (coronary artery disease)     MI in 1994    Chronic pain     Followed by Dr. Sheila Carvalho Chronic pain     Fibromyalgia    Dysthymic disorder 11/9/2012    PTSD, memory loss short term /  dementia depression    Fibromyalgia     GERD (gastroesophageal reflux disease)     rarely    Herniated cervical disc     Ill-defined condition     lymphedema in legs    Ill-defined condition     dementia    Kidney stone     hx frequently    Lumbago     Lymphedema     uses compression stockings in daytime, flexitouch pump at night    Lymphedema     Memory loss 2011    Memory loss, short term     MVP (mitral valve prolapse)     Nausea & vomiting     after general anesthesia    PCOS (polycystic ovarian syndrome)     PTSD (post-traumatic stress disorder)     Pulmonary embolism ([address] X2    Associated with surgery X2    Scoliosis     Sinus infection     saw PCP 3/15/19  started treatment 3/18/18    Stroke [facility])     2014    Sun-damaged skin     Sunburn, blistering     Tanning bed exposure     TIA on medication     TIA L hemiparesis - Was on diet pills     TMJ (dislocation of temporomandibular joint)     Vitamin B 12 deficiency     resolved    Vitamin D deficiency       Past Surgical History:   Procedure Laterality Date    CARDIAC SURG PROCEDURE UNLIST      catheterization - heart attack, mitral valve prolapse    COLONOSCOPY N/A 3/15/2019    COLONOSCOPY-no info to  performed by Negin Virk MD at [address] [facility] HX BREAST AUGMENTATION Bilateral     [facility]    , PCOS    HX DILATION AND CURETTAGE  2019    H/S, D+C with myosure--normal path with endometrial polyp    HX GASTRIC BYPASS  1999    [facility]: open gastric bypass, naseem,  umb hernia repair, tr. vagotomy    HX ORTHOPAEDIC  [id number],    bunion surgery, heel spur surgery    HX TONSILLECTOMY      As a child      Current Outpatient Medications   Medication Sig Dispense Refill    promethazine (PHENERGAN) 25 mg tablet Take 1 Tab by mouth every eight (8) hours as needed for Nausea.  120 Tab 1    tiZANidine (ZANAFLEX) 2 mg tablet TAKE 1 TABLET BY MOUTH THREE TIMES DAILY **ALTERNATING  WITH  FLEXERIL** 90 Tab 2    ondansetron (ZOFRAN ODT) 8 mg disintegrating tablet DISSOLVE 1 TABLET IN MOUTH EVERY 8 HOURS AS NEEDED FOR NAUSEA 30 Tab 3    gabapentin (NEURONTIN) 400 mg capsule Take 2 Caps by mouth three (3) times daily. 540 Cap 1    losartan-hydroCHLOROthiazide (HYZAAR) 50-12.5 mg per tablet Take 1 Tab by mouth daily.  meloxicam (MOBIC) 7.5 mg tablet Take 1 Tab by mouth two (2) times a day.  FLUoxetine (PROZAC) 40 mg capsule Take 1 Cap by mouth daily. 90 Cap 1    DULoxetine (CYMBALTA) 60 mg capsule TAKE ONE CAPSULE BY MOUTH TWICE DAILY 180 Cap 1    montelukast (SINGULAIR) 10 mg tablet Take 1 Tab by mouth daily. 90 Tab 1    ALPRAZolam (XANAX) 1 mg tablet Take 1.5 Tabs by mouth two (2) times daily as needed for Anxiety or Sleep. Max Daily Amount: 3 mg. 270 Tab 0    dextroamphetamine-amphetamine (ADDERALL) 30 mg tablet Take 1 Tab by mouth two (2) times a day for 29 days. Max Daily Amount: 2 Tabs. 60 Tab 0    SSD 1 % topical cream APPLY TO AFFECTED AREA DAILY 50 g 3    ibuprofen (MOTRIN) 600 mg tablet TAKE 1 TABLET BY MOUTH EVERY 6 HOURS AS NEEDED FOR PAIN 90 Tab 1    diphenhydrAMINE (BENADRYL) 25 mg capsule Take 50 mg by mouth nightly.  Calcium Carbonate-Vit D3-Min 600 mg calcium- 400 unit tab Take 1,200 Tabs by mouth Daily (before breakfast).  OTHER Take 2 Tabs by mouth two (2) times a day. Medication call \"Leg Cramps\"  PTC taken 2x daily for severe leg cramps      lidocaine (LIDODERM) 5 % 3 Patches by TransDERmal route every twenty-four (24) hours. Apply patch to the affected area for 12 hours a day and remove for 12 hours a day. Applies 2 patches on mid and lower back, and 3rd patch on knee, whichever is most painful at the time.  prenatal 65/NUDK fum/folic/dha (PRENATAL-1 PO) Take 1 Tab by mouth Daily (before breakfast).  EPINEPHrine (EPIPEN) 0.3 mg/0.3 mL injection epinephrine 0.3 mg/0.3 mL injection, auto-injector      NYSTOP powder APPLY  POWDER TOPICALLY 4 TIMES DAILY TO AFFECTED AREA FOR 30 DAYS 60 g 5    nystatin (MYCOSTATIN) topical cream Apply  to affected area two (2) times daily as needed for Skin Irritation or Itching.  30 g 5    scopolamine (TRANSDERM-SCOP) 1 mg over 3 days pt3d 1 Patch by TransDERmal route every seventy-two (72) hours. 10 Patch 2     Allergies   Allergen Reactions    Bee Sting [Sting, Bee] Anaphylaxis    Morphine Other (comments)     Severe stomach cramps     Vicodin [Hydrocodone-Acetaminophen] Hives       Family History   Problem Relation Age of Onset    Hypertension Mother     Ovarian Cancer Mother         Spread to Throat and Lung     Cancer Mother 61        unknown type    Diabetes Father     Stroke Father     Hypertension Father     Breast Cancer Sister     Breast Cancer Maternal Grandmother         Passed away from COPD - Former smoker     COPD Maternal Grandmother     Cancer Maternal Grandmother 79        colon     Social History     Tobacco Use    Smoking status: Never Smoker    Smokeless tobacco: Never Used    Tobacco comment: Never used vapor or e-cigs    Substance Use Topics    Alcohol use: No     Alcohol/week: 0.0 standard drinks       Depression Risk Factor Screening:     3 most recent PHQ Screens 7/23/2018   Little interest or pleasure in doing things Nearly every day   Feeling down, depressed, irritable, or hopeless More than half the days   Total Score PHQ 2 5       Alcohol Risk Factor Screening:   Do you average 1 drink per night or more than 7 drinks a week:  No    On any one occasion in the past three months have you have had more than 3 drinks containing alcohol:  No    Patient states she never drinks alcohol. Functional Ability and Level of Safety:   Hearing: Hearing is good. States she feels hearing is \"clogged\" sometimes. Activities of Daily Living: The home contains: handrails and rugs  Patient does total self care     Ambulation: with mild difficulty. Has significant history of falls, states she falls 5-6 times per month. States a rollator may help. Fall Risk:  Fall Risk Assessment, last 12 mths 7/23/2018   Able to walk? Yes   Fall in past 12 months? Yes   Fall with injury?  Yes Number of falls in past 12 months 5   Fall Risk Score 6       Abuse Screen:  Patient is not abused    Cognitive Screening   Has your family/caregiver stated any concerns about your memory: yes - History of PTSD and memory loss ([id number]) Seen Dr. Chance Sow in the past, will be seen in [address] [place] [address] in January  Cognitive Screening: Normal - MMSE (Mini Mental Status Exam)    Patient Care Team   Patient Care Team:  Minor Hendreson MD as PCP - General (Family Practice)  Chris Bowen MD as PCP - [facility] Provider    Assessment/Plan   Education and counseling provided:  Are appropriate based on today's review and evaluation  End-of-Life planning (with patient's consent)  diet/exercise    Diagnoses and all orders for this visit:    1. Initial Medicare annual wellness visit    2. Morbid obesity ([address]    3. Advanced directives, counseling/discussion  -     ADVANCE CARE PLANNING FIRST 30 MINS    4. Screening for alcoholism  -     DE ANNUAL ALCOHOL SCREEN 15 MIN    5.  Gait instability  -     REFERRAL TO PHYSICAL THERAPY        Health Maintenance Due   Topic Date Due    MEDICARE YEARLY EXAM  10/23/2018    BREAST CANCER SCRN MAMMOGRAM  08/31/2019

## 2019-12-27 ENCOUNTER — OFFICE VISIT (OUTPATIENT)
Dept: FAMILY MEDICINE CLINIC | Age: 59
End: 2019-12-27

## 2019-12-27 VITALS
TEMPERATURE: 97.9 F | HEIGHT: 65 IN | BODY MASS INDEX: 38.49 KG/M2 | OXYGEN SATURATION: 98 % | HEART RATE: 85 BPM | RESPIRATION RATE: 18 BRPM | DIASTOLIC BLOOD PRESSURE: 87 MMHG | WEIGHT: 231 LBS | SYSTOLIC BLOOD PRESSURE: 135 MMHG

## 2019-12-27 DIAGNOSIS — E66.01 MORBID OBESITY (HCC): ICD-10-CM

## 2019-12-27 DIAGNOSIS — Z13.39 SCREENING FOR ALCOHOLISM: ICD-10-CM

## 2019-12-27 DIAGNOSIS — Z71.89 ADVANCED DIRECTIVES, COUNSELING/DISCUSSION: ICD-10-CM

## 2019-12-27 DIAGNOSIS — R26.81 GAIT INSTABILITY: ICD-10-CM

## 2019-12-27 DIAGNOSIS — Z00.00 INITIAL MEDICARE ANNUAL WELLNESS VISIT: Primary | ICD-10-CM

## 2019-12-27 NOTE — PATIENT INSTRUCTIONS
Medicare Wellness Visit, Female     The best way to live healthy is to have a lifestyle where you eat a well-balanced diet, exercise regularly, limit alcohol use, and quit all forms of tobacco/nicotine, if applicable. Regular preventive services are another way to keep healthy. Preventive services (vaccines, screening tests, monitoring & exams) can help personalize your care plan, which helps you manage your own care. Screening tests can find health problems at the earliest stages, when they are easiest to treat. [facility] follows the current, evidence-based guidelines published by the [facility] ([facility]) when recommending preventive services for our patients. Because we follow these guidelines, sometimes recommendations change over time as research supports it. (For example, mammograms used to be recommended annually. Even though Medicare will still pay for an annual mammogram, the newer guidelines recommend a mammogram every two years for women of average risk). Of course, you and your doctor may decide to screen more often for some diseases, based on your risk and your co-morbidities (chronic disease you are already diagnosed with). Preventive services for you include:  - Medicare offers their members a free annual wellness visit, which is time for you and your primary care provider to discuss and plan for your preventive service needs. Take advantage of this benefit every year!  -All adults over the age of 72 should receive the recommended pneumonia vaccines. Current USPSTF guidelines recommend a series of two vaccines for the best pneumonia protection.   -All adults should have a flu vaccine yearly and a tetanus vaccine every 10 years.   -All adults age 48 and older should receive the shingles vaccines (series of two vaccines).       -All adults age 38-68 who are overweight should have a diabetes screening test once every three years.   -All adults born between [birth year] and 1965 should be screened once for Hepatitis C.  -Other screening tests and preventive services for persons with diabetes include: an eye exam to screen for diabetic retinopathy, a kidney function test, a foot exam, and stricter control over your cholesterol.   -Cardiovascular screening for adults with routine risk involves an electrocardiogram (ECG) at intervals determined by your doctor.   -Colorectal cancer screenings should be done for adults age 54-65 with no increased risk factors for colorectal cancer. There are a number of acceptable methods of screening for this type of cancer. Each test has its own benefits and drawbacks. Discuss with your doctor what is most appropriate for you during your annual wellness visit. The different tests include: colonoscopy (considered the best screening method), a fecal occult blood test, a fecal DNA test, and sigmoidoscopy.    -A bone mass density test is recommended when a woman turns 65 to screen for osteoporosis. This test is only recommended one time, as a screening. Some providers will use this same test as a disease monitoring tool if you already have osteoporosis. -Breast cancer screenings are recommended every other year for women of normal risk, age 54-69.  -Cervical cancer screenings for women over age 72 are only recommended with certain risk factors. Here is a list of your current Health Maintenance items (your personalized list of preventive services) with a due date:  Health Maintenance Due   Topic Date Due    Shingles Vaccine (1 of 2) 12/16/2010    Annual Well Visit  10/23/2018    Mammogram  08/31/2019            Eating Healthy Foods: Care Instructions  Your Care Instructions    Eating healthy foods can help lower your risk for disease. Healthy food gives you energy and keeps your heart strong, your brain active, your muscles working, and your bones strong.   A healthy diet includes a variety of foods from the basic food groups: grains, vegetables, fruits, milk and milk products, and meat and beans. Some people may eat more of their favorite foods from only one food group and, as a result, miss getting the nutrients they need. So, it is important to pay attention not only to what you eat but also to what you are missing from your diet. You can eat a healthy, balanced diet by making a few small changes. Follow-up care is a key part of your treatment and safety. Be sure to make and go to all appointments, and call your doctor if you are having problems. It's also a good idea to know your test results and keep a list of the medicines you take. How can you care for yourself at home? Look at what you eat  · Keep a food diary for a week or two and record everything you eat or drink. Track the number of servings you eat from each food group. · For a balanced diet every day, eat a variety of:  ? 6 or more ounce-equivalents of grains, such as cereals, breads, crackers, rice, or pasta, every day. An ounce-equivalent is 1 slice of bread, 1 cup of ready-to-eat cereal, or ½ cup of cooked rice, cooked pasta, or cooked cereal.  ? 2½ cups of vegetables, especially:  § Dark-green vegetables such as broccoli and spinach. § Orange vegetables such as carrots and sweet potatoes. § Dry beans (such as byrne and kidney beans) and peas (such as lentils). ? 2 cups of fresh, frozen, or canned fruit. A small apple or 1 banana or orange equals 1 cup. ? 3 cups of nonfat or low-fat milk, yogurt, or other milk products. ? 5½ ounces of meat and beans, such as chicken, fish, lean meat, beans, nuts, and seeds. One egg, 1 tablespoon of peanut butter, ½ ounce nuts or seeds, or ¼ cup of cooked beans equals 1 ounce of meat. · Learn how to read food labels for serving sizes and ingredients. Fast-food and convenience-food meals often contain few or no fruits or vegetables. Make sure you eat some fruits and vegetables to make the meal more nutritious.   · Look at your food diary. For each food group, add up what you have eaten and then divide the total by the number of days. This will give you an idea of how much you are eating from each food group. See if you can find some ways to change your diet to make it more healthy. Start small  · Do not try to make dramatic changes to your diet all at once. You might feel that you are missing out on your favorite foods and then be more likely to fail. · Start slowly, and gradually change your habits. Try some of the following:  ? Use whole wheat bread instead of white bread. ? Use nonfat or low-fat milk instead of whole milk. ? Eat brown rice instead of white rice, and eat whole wheat pasta instead of white-flour pasta. ? Try low-fat cheeses and low-fat yogurt. ? Add more fruits and vegetables to meals and have them for snacks. ? Add lettuce, tomato, cucumber, and onion to sandwiches. ? Add fruit to yogurt and cereal.  Enjoy food  · You can still eat your favorite foods. You just may need to eat less of them. If your favorite foods are high in fat, salt, and sugar, limit how often you eat them, but do not cut them out entirely. · Eat a wide variety of foods. Make healthy choices when eating out  · The type of restaurant you choose can help you make healthy choices. Even fast-food chains are now offering more low-fat or healthier choices on the menu. · Choose smaller portions, or take half of your meal home. · When eating out, try:  ? A veggie pizza with a whole wheat crust or grilled chicken (instead of sausage or pepperoni). ? Pasta with roasted vegetables, grilled chicken, or marinara sauce instead of cream sauce. ? A vegetable wrap or grilled chicken wrap. ? Broiled or poached food instead of fried or breaded items. Make healthy choices easy  · Buy packaged, prewashed, ready-to-eat fresh vegetables and fruits, such as baby carrots, salad mixes, and chopped or shredded broccoli and cauliflower.   · Buy packaged, presliced fruits, such as melon or pineapple. · Choose 100% fruit or vegetable juice instead of soda. Limit juice intake to 4 to 6 oz (½ to ¾ cup) a day. · Blend low-fat yogurt, fruit juice, and canned or frozen fruit to make a smoothie for breakfast or a snack. Where can you learn more? Go to http://jonathon-margareth.info/. Enter T756 in the search box to learn more about \"Eating Healthy Foods: Care Instructions. \"  Current as of: November 7, 2018  Content Version: 12.2  © 4768-3018 [employer]. Care instructions adapted under license by [employer] (which disclaims liability or warranty for this information). If you have questions about a medical condition or this instruction, always ask your healthcare professional. [address] any warranty or liability for your use of this information.

## 2019-12-27 NOTE — PROGRESS NOTES
1. Have you been to the ER, urgent care clinic since your last visit? Hospitalized since your last visit? No    2. Have you seen or consulted any other health care providers outside of the [address] [place] [address] since your last visit? Include any pap smears or colon screening. No    Chief Complaint   Patient presents with    Annual Wellness Visit       Blood pressure 135/87, pulse 85, temperature 97.9 °F (36.6 °C), temperature source Oral, resp. rate 18, height 5' 5\" (1.651 m), weight 231 lb (104.8 kg), last menstrual period 07/15/2011, SpO2 98 %. General Health Questions   -During the past 4 weeks:   -how would you rate your health in general? Fair   -how often have you been bothered by feeling dizzy when standing up? Many days (3-4)   -how much have you been bothered by bodily pain? severely   -Have you noticed any hearing difficulties? yes   -has your physical and emotional health limited your social activities with family or friends? yes    Emotional Health Questions   -Do you have a history of depression, anxiety, or emotional problems? yes  -Over the past 2 weeks, have you felt down, depressed or hopeless? no  -Over the past 2 weeks, have you felt little interest or pleasure in doing things? yes    Health Habits   Please describe your diet habits: \"I used to never eat chocolate. I have been eating pretzels, grapes and chocolate for past 2 months. I have gained 20lbs. I feel as if I am always starving\". Do you get 5 servings of fruits or vegetables daily? yes  Do you exercise regularly? Yes in summer - not so much in winter.     Activities of Daily Living and Functional Status   -Do you need help with eating, walking, dressing, bathing, toileting, the phone, transportation, shopping, preparing meals, housework, laundry, medications or managing money? no  -In the past four weeks, was someone available to help you if you needed and wanted help with anything? no  -Are you confident are you that you can control and manage most of your health problems? no  -Have you been given information to help you keep track of your medications? yes  -How often do you have trouble taking your medications as prescribed? never    Fall Risk and Home Safety   Have you fallen 2 or more times in the past year? yes  Does your home have rugs in the hallways? yes, Do you have grab bars in the bathrooms?  no, Does your home have handrails on the stairs? yes, Do you have adequate lighting in your home? yes  Do you have smoke detectors and check them regularly?  yes  Do you have difficulties driving a car/vehicle? no  Do you always fasten your seat belt when you are in a car? yes

## 2019-12-30 ENCOUNTER — TELEPHONE (OUTPATIENT)
Dept: FAMILY MEDICINE CLINIC | Age: 59
End: 2019-12-30

## 2019-12-30 NOTE — TELEPHONE ENCOUNTER
Covering for Dr. Jorge Bustamante  Attempted to call patient, went to .    The tizanidine is written for 2mg TID so if she is taking 4mg daily the script Dr. Jorge Bustamante wrote should be enough  Will send Mychart message    Adam Back MD

## 2019-12-30 NOTE — TELEPHONE ENCOUNTER
[phone]    Patient called say the refill for the nerve pill is 2 mg but she takes 4 mg a day. Please have the nurse or doctor to call to discuss the medication issue.

## 2019-12-31 NOTE — TELEPHONE ENCOUNTER
Attempted to call patient about Tizanidine prescription . Left voicemail for patient to return call.

## 2020-01-03 NOTE — TELEPHONE ENCOUNTER
Second attempt at calling patient about Tizanidine prescription. Unable to leave voicemail due to voicemail box full.

## 2020-01-08 ENCOUNTER — TELEPHONE (OUTPATIENT)
Dept: FAMILY MEDICINE CLINIC | Age: 60
End: 2020-01-08

## 2020-01-08 NOTE — TELEPHONE ENCOUNTER
Two patient identifiers confirmed (name and ). Spoke with patient regarding request for Tizanidine 4 mg. Advised patient per   Dr. Garrison Nunez note that patient should have enough due to Tizanidine mg  script written by Dr. Glo Kaur  to take three times daily. Patient verbalized understanding. Patient states she was able to get prescription. Patient states she has ran out of medication. Patient states she was recently seen by [address] [place] [address],[address] who prescribed her an antibiotic. Patient states she is unable to get Tizanidine prescription due to interaction with antibiotic. Patient requesting if doctor can sent an alternative antibiotic. Advised patient she will need to schedule appointment. Patient verbalized understanding. Patient states she will call office back to schedule appointment.

## 2020-01-10 ENCOUNTER — TELEPHONE (OUTPATIENT)
Dept: FAMILY MEDICINE CLINIC | Age: 60
End: 2020-01-10

## 2020-01-10 NOTE — TELEPHONE ENCOUNTER
Pt is asking if Erich Alejandro will give her a call back in regards to \" [facility]. \"

## 2020-01-17 ENCOUNTER — HOSPITAL ENCOUNTER (OUTPATIENT)
Dept: LAB | Age: 60
Discharge: HOME | End: 2020-01-17

## 2020-01-17 ENCOUNTER — OFFICE VISIT (OUTPATIENT)
Dept: FAMILY MEDICINE CLINIC | Age: 60
End: 2020-01-17

## 2020-01-17 VITALS
DIASTOLIC BLOOD PRESSURE: 88 MMHG | RESPIRATION RATE: 18 BRPM | HEART RATE: 78 BPM | BODY MASS INDEX: 39.32 KG/M2 | TEMPERATURE: 97.7 F | HEIGHT: 65 IN | OXYGEN SATURATION: 96 % | SYSTOLIC BLOOD PRESSURE: 124 MMHG | WEIGHT: 236 LBS

## 2020-01-17 DIAGNOSIS — M48.061 SPINAL STENOSIS OF LUMBAR REGION, UNSPECIFIED WHETHER NEUROGENIC CLAUDICATION PRESENT: ICD-10-CM

## 2020-01-17 DIAGNOSIS — R39.9 UTI SYMPTOMS: ICD-10-CM

## 2020-01-17 DIAGNOSIS — M51.36 LUMBAR DEGENERATIVE DISC DISEASE: ICD-10-CM

## 2020-01-17 DIAGNOSIS — R30.0 DYSURIA: ICD-10-CM

## 2020-01-17 DIAGNOSIS — R30.0 DYSURIA: Primary | ICD-10-CM

## 2020-01-17 LAB
BILIRUB UR QL STRIP: ABNORMAL
GLUCOSE UR-MCNC: NEGATIVE MG/DL
KETONES P FAST UR STRIP-MCNC: ABNORMAL MG/DL
PH UR STRIP: 5.5 [PH] (ref 4.6–8)
PROT UR QL STRIP: ABNORMAL
SP GR UR STRIP: 1.02 (ref 1–1.03)
UA UROBILINOGEN AMB POC: ABNORMAL (ref 0.2–1)
URINALYSIS CLARITY POC: ABNORMAL
URINALYSIS COLOR POC: ABNORMAL
URINE BLOOD POC: NEGATIVE
URINE LEUKOCYTES POC: ABNORMAL
URINE NITRITES POC: POSITIVE

## 2020-01-17 RX ORDER — LOSARTAN POTASSIUM 50 MG/1
1 TABLET ORAL DAILY
COMMUNITY
Start: 2020-01-02

## 2020-01-17 RX ORDER — TRAMADOL HYDROCHLORIDE 50 MG/1
50 TABLET ORAL
Qty: 21 TAB | Refills: 0 | Status: SHIPPED | OUTPATIENT
Start: 2020-01-17 | End: 2020-01-24

## 2020-01-17 RX ORDER — CEFDINIR 300 MG/1
300 CAPSULE ORAL 2 TIMES DAILY
Qty: 20 CAP | Refills: 0 | Status: SHIPPED | OUTPATIENT
Start: 2020-01-17 | End: 2020-01-27

## 2020-01-17 RX ORDER — NALOXONE HYDROCHLORIDE 4 MG/.1ML
SPRAY NASAL
Qty: 2 EACH | Refills: 0 | Status: SHIPPED | OUTPATIENT
Start: 2020-01-17

## 2020-01-17 RX ORDER — BUMETANIDE 0.5 MG/1
0.5 TABLET ORAL DAILY
COMMUNITY

## 2020-01-17 NOTE — PROGRESS NOTES
Identified pt with two pt identifiers(name and ). Chief Complaint   Patient presents with    Pain (Chronic)    Medication Refill    Fatigue    Letter for School/Work     to get back in to see Dr. Daisha Rodriguez MAMMOGRAM        Wt Readings from Last 3 Encounters:   20 236 lb (107 kg)   19 231 lb (104.8 kg)   19 229 lb (103.9 kg)     Temp Readings from Last 3 Encounters:   20 97.7 °F (36.5 °C) (Oral)   19 97.9 °F (36.6 °C) (Oral)   19 98.3 °F (36.8 °C) (Oral)     BP Readings from Last 3 Encounters:   20 124/88   19 135/87   19 129/89     Pulse Readings from Last 3 Encounters:   20 78   19 85   19 84         Learning Assessment:  :     Learning Assessment 2016 2015 2015 1/3/2014   PRIMARY LEARNER Patient Patient Patient Patient   HIGHEST LEVEL OF EDUCATION - PRIMARY LEARNER  - - - 4 YEARS OF COLLEGE   BARRIERS PRIMARY LEARNER - NONE - COGNITIVE   CO-LEARNER CAREGIVER - No - -   PRIMARY LANGUAGE ENGLISH ENGLISH ENGLISH ENGLISH   LEARNER PREFERENCE PRIMARY DEMONSTRATION DEMONSTRATION VIDEOS READING     - - - VIDEOS   ANSWERED BY patient  patient patient patient   RELATIONSHIP SELF SELF SELF SELF       Depression Screening:  :     3 most recent PHQ Screens 2018   Little interest or pleasure in doing things Nearly every day   Feeling down, depressed, irritable, or hopeless More than half the days   Total Score PHQ 2 5       Fall Risk Assessment:  :     Fall Risk Assessment, last 12 mths 2018   Able to walk? Yes   Fall in past 12 months? Yes   Fall with injury? Yes   Number of falls in past 12 months 5   Fall Risk Score 6       Abuse Screening:  :     Abuse Screening Questionnaire 2018 2015 3/10/2014   Do you ever feel afraid of your partner? N N N   Are you in a relationship with someone who physically or mentally threatens you?  N N N   Is it safe for you to go home? Y Y Y       Coordination of Care Questionnaire:  :     1) Have you been to an emergency room, urgent care clinic since your last visit? no   Hospitalized since your last visit? no             2) Have you seen or consulted any other health care providers outside of [address] [place] [address] since your last visit? no  (Include any pap smears or colon screenings in this section.)    3) Do you have an Advance Directive on file? no  Are you interested in receiving information about Advance Directives? no    Reviewed record in preparation for visit and have obtained necessary documentation. Medication reconciliation up to date and corrected with patient at this time.

## 2020-01-17 NOTE — PROGRESS NOTES
Subjective:      Axel Guzman is a 61 y.o. female here with complaint of \"I'm exhausted, I'm in pain, I can't get out of pain\". Reports that her lymphedema has caused increased pain in the lower back and right knee. She has been using her compression device. States that this is secondary to her not moving due to her lower back pain. Recent lumbar MRI (9/24/19) with multilevel DDD worse at L4-5 on the left. Pain is located across her lower back and it travels to her hips and down the left leg into the foot and down the right leg to the knee. She describes her pain as constant, aching, deep, and burning. Is currently being managed by Dr. Gaurav Scanlon and has been undergoing PAMELA which she report have not been effective. He is managing her tizanidine and reports that this has been increased to 8 mg at night and she takes 2-4 mg during the day. She has been seen by Dr. Vitaliy Ordoñez (ortho) on 1/13/2020 - per reviewed note, not a candidate for lumbar laminectomy as there is no left leg pain; seems to be a better candidate for spinal cord stimulator procedure. She has been trying to reestablish with her former provider Dr. Patricia Newell. Reports that she has been informed that a [id number] will be needed in order for her to be seen. Office number is [phone]. Also reports dysuria, and urinary frequency. Onset was about a week ago. Reports being seen by [facility] and diagnosed with UTI. Was prescribed ciprofloxacin, but unable to fill due to medication interaction. Last dose of Azo was yesterday. No reported fever, chills, hematuria. Current Outpatient Medications   Medication Sig Dispense Refill    losartan (COZAAR) 50 mg tablet Take 1 Tab by mouth daily.  bumetanide (BUMEX) 0.5 mg tablet Take 0.5 mg by mouth daily.  promethazine (PHENERGAN) 25 mg tablet Take 1 Tab by mouth every eight (8) hours as needed for Nausea.  120 Tab 1    tiZANidine (ZANAFLEX) 2 mg tablet TAKE 1 TABLET BY MOUTH THREE TIMES DAILY **ALTERNATING  WITH  FLEXERIL** 90 Tab 2    ondansetron (ZOFRAN ODT) 8 mg disintegrating tablet DISSOLVE 1 TABLET IN MOUTH EVERY 8 HOURS AS NEEDED FOR NAUSEA 30 Tab 3    gabapentin (NEURONTIN) 400 mg capsule Take 2 Caps by mouth three (3) times daily. 540 Cap 1    losartan-hydroCHLOROthiazide (HYZAAR) 50-12.5 mg per tablet Take 1 Tab by mouth daily.  meloxicam (MOBIC) 7.5 mg tablet Take 1 Tab by mouth two (2) times a day.  FLUoxetine (PROZAC) 40 mg capsule Take 1 Cap by mouth daily. 90 Cap 1    DULoxetine (CYMBALTA) 60 mg capsule TAKE ONE CAPSULE BY MOUTH TWICE DAILY 180 Cap 1    montelukast (SINGULAIR) 10 mg tablet Take 1 Tab by mouth daily. 90 Tab 1    ALPRAZolam (XANAX) 1 mg tablet Take 1.5 Tabs by mouth two (2) times daily as needed for Anxiety or Sleep. Max Daily Amount: 3 mg. 270 Tab 0    SSD 1 % topical cream APPLY TO AFFECTED AREA DAILY 50 g 3    NYSTOP powder APPLY  POWDER TOPICALLY 4 TIMES DAILY TO AFFECTED AREA FOR 30 DAYS 60 g 5    ibuprofen (MOTRIN) 600 mg tablet TAKE 1 TABLET BY MOUTH EVERY 6 HOURS AS NEEDED FOR PAIN 90 Tab 1    nystatin (MYCOSTATIN) topical cream Apply  to affected area two (2) times daily as needed for Skin Irritation or Itching. 30 g 5    diphenhydrAMINE (BENADRYL) 25 mg capsule Take 50 mg by mouth nightly.  Calcium Carbonate-Vit D3-Min 600 mg calcium- 400 unit tab Take 1,200 Tabs by mouth Daily (before breakfast).  OTHER Take 2 Tabs by mouth two (2) times a day. Medication call \"Leg Cramps\"  PTC taken 2x daily for severe leg cramps      lidocaine (LIDODERM) 5 % 3 Patches by TransDERmal route every twenty-four (24) hours. Apply patch to the affected area for 12 hours a day and remove for 12 hours a day. Applies 2 patches on mid and lower back, and 3rd patch on knee, whichever is most painful at the time.  prenatal 43/YLGY fum/folic/dha (PRENATAL-1 PO) Take 1 Tab by mouth Daily (before breakfast).       EPINEPHrine (EPIPEN) 0.3 mg/0.3 mL injection epinephrine 0.3 mg/0.3 mL injection, auto-injector         Allergies   Allergen Reactions    Bee Sting [Sting, Bee] Anaphylaxis    Morphine Other (comments)     Severe stomach cramps     Vicodin [Hydrocodone-Acetaminophen] Hives       Past Medical History:   Diagnosis Date    ADD (attention deficit disorder)     Anemia     Anxiety     Autoimmune disease (HCC)     fibromyalgia    Burn     Shoulder, back and buttocks  from use of heating pad.     CAD (coronary artery disease)     MI in 1994    Chronic pain     Followed by Dr. Aidan Sanabria Chronic pain     Fibromyalgia    Dysthymic disorder 11/9/2012    PTSD, memory loss short term /  dementia depression    Fibromyalgia     GERD (gastroesophageal reflux disease)     rarely    Herniated cervical disc     Ill-defined condition     lymphedema in legs    Ill-defined condition     dementia    Kidney stone     hx frequently    Lumbago     Lymphedema     uses compression stockings in daytime, flexitouch pump at night    Lymphedema     Memory loss 1/17/2011    Memory loss, short term     MVP (mitral valve prolapse)     Nausea & vomiting     after general anesthesia    PCOS (polycystic ovarian syndrome)     PTSD (post-traumatic stress disorder)     Pulmonary embolism ([address] X2    Associated with surgery X2    Scoliosis     Sinus infection     saw PCP 3/15/19  started treatment 3/18/18    Stroke [facility])     april 2014    Sun-damaged skin     Sunburn, blistering     Tanning bed exposure     TIA on medication     TIA L hemiparesis - Was on diet pills 2015    TMJ (dislocation of temporomandibular joint)     Vitamin B 12 deficiency     resolved    Vitamin D deficiency        Social History     Tobacco Use    Smoking status: Never Smoker    Smokeless tobacco: Never Used    Tobacco comment: Never used vapor or e-cigs    Substance Use Topics    Alcohol use: No     Alcohol/week: 0.0 standard drinks        Review of Systems  Pertinent items are noted in HPI. Objective:     Visit Vitals  /88 (BP 1 Location: Right arm, BP Patient Position: Sitting) Comment: Manual   Pulse 78   Temp 97.7 °F (36.5 °C) (Oral) Comment: .   Resp 18   Ht 5' 5\" (1.651 m)   Wt 236 lb (107 kg)   LMP 07/15/2011   SpO2 96%   BMI 39.27 kg/m²      General appearance - alert, well appearing, and in no distress  Chest - clear to auscultation, no wheezes, rales or rhonchi, symmetric air entry, no tachypnea, retractions or cyanosis  Heart - normal rate, regular rhythm, normal S1, S2, no murmurs, rubs, clicks or gallops  Abdomen - soft, nontender, nondistended, no masses, no CVA tenderness  Musculoskeletal - Back: antalgic gait, tenderness noted lumbar spine and bilateral lumbar paraspinal muscles    Recent Results (from the past 12 hour(s))   AMB POC URINALYSIS DIP STICK AUTO W/O MICRO    Collection Time: 01/17/20  4:12 PM   Result Value Ref Range    Color (UA POC) Carolynn     Clarity (UA POC) Cloudy     Glucose (UA POC) Negative Negative    Bilirubin (UA POC) 1+ Negative    Ketones (UA POC) Trace Negative    Specific gravity (UA POC) 1.025 1.001 - 1.035    Blood (UA POC) Negative Negative    pH (UA POC) 5.5 4.6 - 8.0    Protein (UA POC) Trace Negative    Urobilinogen (UA POC) 0.2 mg/dL 0.2 - 1    Nitrites (UA POC) Positive Negative    Leukocyte esterase (UA POC) 1+ Negative       Assessment/Plan:   Pal Nur is a 61 y.o. female seen for:     1. Dysuria  - AMB POC URINALYSIS DIP STICK AUTO W/O MICRO  - CULTURE, URINE; Future  - cefdinir (OMNICEF) 300 mg capsule; Take 1 Cap by mouth two (2) times a day for 10 days. Dispense: 20 Cap; Refill: 0    2. UTI symptoms: history and UA highly suggestive for UTI. Will start empiric treatment as below. Urine sent for culture. Push fluids. - cefdinir (OMNICEF) 300 mg capsule; Take 1 Cap by mouth two (2) times a day for 10 days. Dispense: 20 Cap; Refill: 0    3.  Lumbar degenerative disc disease: currently undergoing PAMELA with Dr. Simpson Lot which has not been beneficial per her report. Had been previously on opioid therapy by her former PCP, but this was discontinued when she was unable to see him due to insurance change. Did have Rx for tramadol from Orthopedics, but reports being unable to fill. Discussed with patient concerns for tramadol use with her other medications including Xanax due to risk of respiratory depression or death. Rx for Narcan has been provided. She is looking to reestablish care with Dr. Shana Correa and is asking that I can his office.  - traMADol (ULTRAM) 50 mg tablet; Take 1 Tab by mouth every six (6) hours as needed for Pain for up to 7 days. Max Daily Amount: 200 mg. Dispense: 21 Tab; Refill: 0 - discussed that this would be a one-time only prescription. Recommend that she follow up with Dr. Juliet Bone whom she states \"weber not believe in pain medication\". - naloxone (NARCAN) 4 mg/actuation nasal spray; Use 1 spray intranasally, then discard. Repeat with new spray every 2 min as needed for opioid overdose symptoms, alternating nostrils. Dispense: 2 Each; Refill: 0    4. Spinal stenosis of lumbar region, unspecified whether neurogenic claudication present  - traMADol (ULTRAM) 50 mg tablet; Take 1 Tab by mouth every six (6) hours as needed for Pain for up to 7 days. Max Daily Amount: 200 mg. Dispense: 21 Tab; Refill: 0  - naloxone (NARCAN) 4 mg/actuation nasal spray; Use 1 spray intranasally, then discard. Repeat with new spray every 2 min as needed for opioid overdose symptoms, alternating nostrils. Dispense: 2 Each; Refill: 0    I have discussed the diagnosis with the patient and the intended plan as seen in the above orders. The patient has received an after-visit summary and questions were answered concerning future plans. I have discussed medication side effects and warnings with the patient as well. Patient verbalizes understanding of plan of care and denies further questions or concerns at this time.  Informed patient to return to the office if symptoms worsen or if new symptoms arise.

## 2020-01-20 ENCOUNTER — TELEPHONE (OUTPATIENT)
Dept: FAMILY MEDICINE CLINIC | Age: 60
End: 2020-01-20

## 2020-01-20 LAB
BACTERIA SPEC CULT: ABNORMAL
BACTERIA SPEC CULT: ABNORMAL
CC UR VC: ABNORMAL
SERVICE CMNT-IMP: ABNORMAL

## 2020-01-20 NOTE — TELEPHONE ENCOUNTER
----- Message from Stephy Backer sent at 1/20/2020  3:07 PM EST -----  Regarding: Dr. Cabello : [phone]  Caller's first and last name and relationship (if not the patient): n/a  Best contact number(s): [phone]  Whose call is being returned: Dr. Reilly Mariscal  Details to clarify the request: Patient stated that she received a voicemail to give Dr. Reilly Mariscal a call back today.

## 2020-01-20 NOTE — TELEPHONE ENCOUNTER
----- Message from [address] sent at 1/18/2020  9:18 AM EST -----  Regarding: Dr. Dustin Bryant first and last name: Sruthi Giordano from [address] [facility]       Reason for call: requesting a callback in regards to drug interaction.       Callback required yes/no and why: yes      Best contact number(s): [phone]      Details to clarify the request:      [address]

## 2020-01-20 NOTE — TELEPHONE ENCOUNTER
Returned call to Lisseth Thompson at [address] [facility] and she wanted to make Dr. Connie Mott aware that pt is receiving controlled medications from several physicians. Dr. Connie Mott was able to come to the phone and discuss this with Lisseth Thompson personally.

## 2020-01-21 NOTE — TELEPHONE ENCOUNTER
Relayed to patient that we will give Dr. Shauna Maier office a call today to help with re-entry into pain management.

## 2020-01-21 NOTE — TELEPHONE ENCOUNTER
Pt called stating that she needs to speak with Bi Kenyon or Dr Indy Rg regarding pain medication and Dr Jason Mercado. Pt was not at all clear with me.     Best contact: [phone]

## 2020-01-22 RX ORDER — ONDANSETRON 8 MG/1
TABLET, ORALLY DISINTEGRATING ORAL
Qty: 20 TAB | Refills: 5 | Status: SHIPPED | OUTPATIENT
Start: 2020-01-22

## 2020-01-28 DIAGNOSIS — F41.9 ANXIETY: ICD-10-CM

## 2020-01-31 RX ORDER — ALPRAZOLAM 1 MG/1
TABLET ORAL
Qty: 270 TAB | Refills: 0 | Status: SHIPPED | OUTPATIENT
Start: 2020-01-31 | End: 2020-04-17

## 2020-02-01 ENCOUNTER — HOSPITAL ENCOUNTER (OUTPATIENT)
Dept: VASCULAR SURGERY | Age: 60
Discharge: HOME | End: 2020-02-01
Attending: ORTHOPAEDIC SURGERY
Payer: MEDICARE

## 2020-02-01 DIAGNOSIS — M79.604 RIGHT LEG PAIN: ICD-10-CM

## 2020-02-01 DIAGNOSIS — M79.89 RIGHT LEG SWELLING: ICD-10-CM

## 2020-02-01 PROCEDURE — 93971 EXTREMITY STUDY: CPT

## 2020-02-05 ENCOUNTER — OFFICE VISIT (OUTPATIENT)
Dept: FAMILY MEDICINE CLINIC | Age: 60
End: 2020-02-05

## 2020-02-05 VITALS
DIASTOLIC BLOOD PRESSURE: 68 MMHG | BODY MASS INDEX: 38.82 KG/M2 | HEIGHT: 65 IN | OXYGEN SATURATION: 98 % | HEART RATE: 54 BPM | WEIGHT: 233 LBS | RESPIRATION RATE: 18 BRPM | SYSTOLIC BLOOD PRESSURE: 116 MMHG | TEMPERATURE: 98.7 F

## 2020-02-05 DIAGNOSIS — I89.0 LYMPHEDEMA OF BOTH LOWER EXTREMITIES: ICD-10-CM

## 2020-02-05 DIAGNOSIS — F33.2 SEVERE RECURRENT MAJOR DEPRESSION WITHOUT PSYCHOTIC FEATURES (HCC): Primary | ICD-10-CM

## 2020-02-05 DIAGNOSIS — F41.9 ANXIETY: ICD-10-CM

## 2020-02-05 DIAGNOSIS — Z78.0 ASYMPTOMATIC MENOPAUSAL STATE: ICD-10-CM

## 2020-02-05 RX ORDER — FLUOXETINE HYDROCHLORIDE 40 MG/1
40 CAPSULE ORAL DAILY
Qty: 90 CAP | Refills: 1 | Status: SHIPPED | OUTPATIENT
Start: 2020-02-05 | End: 2020-08-14

## 2020-02-05 RX ORDER — TIZANIDINE 4 MG/1
1 TABLET ORAL 2 TIMES DAILY
COMMUNITY
Start: 2020-02-02 | End: 2020-05-12

## 2020-02-05 RX ORDER — KETOROLAC TROMETHAMINE 10 MG/1
1 TABLET, FILM COATED ORAL
COMMUNITY
End: 2020-03-23

## 2020-02-05 NOTE — PROGRESS NOTES
Subjective:      Derek Eng is a 61 y.o. female here for medication refill and referral to Lymphedema clinic. Depression and anxiety: has been under some stress recently. Has been compliant with her current therapy. Tolerating well without adverse side effects. Bilateral lower extremity lymphedema: requesting referral to [facility] for compression therapy. Reports has been pumping her legs nighty, but still with fluid. Looking to have a compression garment from the chest down. Reports that she has been unable to get to [facility] and is still awaiting response from [facility]. She feels as though the lymphedema is contributing to the pain in her right knee and leg. Has been seen by Ortho on 1/28/20 and has had Durolane injection into the knee. Scheduled to see Pain Management next week, will be discussing placement of spinal cord stimulator. Current Outpatient Medications   Medication Sig Dispense Refill    tiZANidine (ZANAFLEX) 4 mg tablet Take 1 Tab by mouth two (2) times a day.  ketorolac (TORADOL) 10 mg tablet Take 1 Tab by mouth every six (6) hours as needed.  ALPRAZolam (XANAX) 1 mg tablet TAKE 1 & 1/2 (ONE & ONE-HALF) TABLETS BY MOUTH TWICE DAILY AS NEEDED FOR ANXIETY OR  SLEEP. MAX  DAILY  AMOUNT  IS  3MG 270 Tab 0    ondansetron (ZOFRAN ODT) 8 mg disintegrating tablet DISSOLVE 1 TABLET IN MOUTH EVERY 8 HOURS AS NEEDED FOR NAUSEA 20 Tab 5    losartan (COZAAR) 50 mg tablet Take 1 Tab by mouth daily.  bumetanide (BUMEX) 0.5 mg tablet Take 0.5 mg by mouth daily.  naloxone (NARCAN) 4 mg/actuation nasal spray Use 1 spray intranasally, then discard. Repeat with new spray every 2 min as needed for opioid overdose symptoms, alternating nostrils. 2 Each 0    promethazine (PHENERGAN) 25 mg tablet Take 1 Tab by mouth every eight (8) hours as needed for Nausea. 120 Tab 1    gabapentin (NEURONTIN) 400 mg capsule Take 2 Caps by mouth three (3) times daily.  540 Cap 1    meloxicam (MOBIC) 7.5 mg tablet Take 1 Tab by mouth two (2) times a day.  FLUoxetine (PROZAC) 40 mg capsule Take 1 Cap by mouth daily. 90 Cap 1    DULoxetine (CYMBALTA) 60 mg capsule TAKE ONE CAPSULE BY MOUTH TWICE DAILY 180 Cap 1    montelukast (SINGULAIR) 10 mg tablet Take 1 Tab by mouth daily. 90 Tab 1    SSD 1 % topical cream APPLY TO AFFECTED AREA DAILY 50 g 3    NYSTOP powder APPLY  POWDER TOPICALLY 4 TIMES DAILY TO AFFECTED AREA FOR 30 DAYS 60 g 5    ibuprofen (MOTRIN) 600 mg tablet TAKE 1 TABLET BY MOUTH EVERY 6 HOURS AS NEEDED FOR PAIN 90 Tab 1    nystatin (MYCOSTATIN) topical cream Apply  to affected area two (2) times daily as needed for Skin Irritation or Itching. 30 g 5    diphenhydrAMINE (BENADRYL) 25 mg capsule Take 50 mg by mouth nightly.  Calcium Carbonate-Vit D3-Min 600 mg calcium- 400 unit tab Take 1,200 Tabs by mouth Daily (before breakfast).  OTHER Take 2 Tabs by mouth two (2) times a day. Medication call \"Leg Cramps\"  PTC taken 2x daily for severe leg cramps      prenatal 87/WYUZ fum/folic/dha (PRENATAL-1 PO) Take 1 Tab by mouth Daily (before breakfast).  EPINEPHrine (EPIPEN) 0.3 mg/0.3 mL injection epinephrine 0.3 mg/0.3 mL injection, auto-injector         Allergies   Allergen Reactions    Bee Sting [Sting, Bee] Anaphylaxis    Morphine Other (comments)     Severe stomach cramps     Vicodin [Hydrocodone-Acetaminophen] Hives       Past Medical History:   Diagnosis Date    ADD (attention deficit disorder)     Anemia     Anxiety     Autoimmune disease (HCC)     fibromyalgia    Burn     Shoulder, back and buttocks  from use of heating pad.     CAD (coronary artery disease)     MI in 1994    Chronic pain     Followed by Dr. Mark Diggs Chronic pain     Fibromyalgia    Dysthymic disorder 11/9/2012    PTSD, memory loss short term /  dementia depression    Fibromyalgia     GERD (gastroesophageal reflux disease)     rarely    Herniated cervical disc     Ill-defined condition lymphedema in legs    Ill-defined condition     dementia    Kidney stone     hx frequently    Lumbago     Lymphedema     uses compression stockings in daytime, flexitouch pump at night    Lymphedema     Memory loss 1/17/2011    Memory loss, short term     MVP (mitral valve prolapse)     Nausea & vomiting     after general anesthesia    PCOS (polycystic ovarian syndrome)     PTSD (post-traumatic stress disorder)     Pulmonary embolism ([facility] [address] X2    Associated with surgery X2    Scoliosis     Sinus infection     saw PCP 3/15/19  started treatment 3/18/18    Stroke [facility])     april 2014    Sun-damaged skin     Sunburn, blistering     Tanning bed exposure     TIA on medication     TIA L hemiparesis - Was on diet pills 2015    TMJ (dislocation of temporomandibular joint)     Vitamin B 12 deficiency     resolved    Vitamin D deficiency        Social History     Tobacco Use    Smoking status: Never Smoker    Smokeless tobacco: Never Used    Tobacco comment: Never used vapor or e-cigs    Substance Use Topics    Alcohol use: No     Alcohol/week: 0.0 standard drinks        Review of Systems  Pertinent items are noted in HPI. Objective:     Visit Vitals  /68 (BP 1 Location: Right arm, BP Patient Position: Sitting) Comment: Manual   Pulse (!) 54   Temp 98.7 °F (37.1 °C) (Oral) Comment: .   Resp 18   Ht 5' 5\" (1.651 m)   Wt 233 lb (105.7 kg)   LMP 07/15/2011   SpO2 98%   BMI 38.77 kg/m²      General appearance - alert, well appearing, and in no distress  Chest - clear to auscultation, no wheezes, rales or rhonchi, symmetric air entry, no tachypnea, retractions or cyanosis  Heart - normal rate, regular rhythm, normal S1, S2, no murmurs, rubs, clicks or gallops  Extremities - bilateral LE edema    Assessment/Plan:   Yashira Fisher is a 61 y.o. female seen for:     1.  Severe recurrent major depression without psychotic features [facility]): continue with current therapy.   - FLUoxetine (PROZAC) 40 mg capsule; Take 1 Cap by mouth daily. Dispense: 90 Cap; Refill: 1    2. Anxiety: increased but she appears to be managing okay at this time. Continue with current therapy. 3. Lymphedema of both lower extremities  - REFERRAL TO LYMPHEDEMA CLINIC    4. Asymptomatic menopausal state   - DEXA BONE DENSITY STUDY AXIAL; Future    I have discussed the diagnosis with the patient and the intended plan as seen in the above orders. The patient has received an after-visit summary and questions were answered concerning future plans. I have discussed medication side effects and warnings with the patient as well. Patient verbalizes understanding of plan of care and denies further questions or concerns at this time. Informed patient to return to the office if symptoms worsen or if new symptoms arise.

## 2020-02-05 NOTE — PROGRESS NOTES
Identified pt with two pt identifiers(name and ). Chief Complaint   Patient presents with    Medication Refill    New Order     Lymphedema wrap. Bone density test, referral to nutristionist.    Pain (Chronic)        Health Maintenance Due   Topic    Diabetes Screen (Age 38-68, no DM, BMI>25, & no RBS <100 in 3Y) (q3y)     Breast Cancer Screen Mammogram        Wt Readings from Last 3 Encounters:   20 233 lb (105.7 kg)   20 236 lb (107 kg)   19 231 lb (104.8 kg)     Temp Readings from Last 3 Encounters:   20 98.7 °F (37.1 °C) (Oral)   20 97.7 °F (36.5 °C) (Oral)   19 97.9 °F (36.6 °C) (Oral)     BP Readings from Last 3 Encounters:   20 116/68   20 124/88   19 135/87     Pulse Readings from Last 3 Encounters:   20 (!) 54   20 78   19 85         Learning Assessment:  :     Learning Assessment 2016 2015 2015 1/3/2014   PRIMARY LEARNER Patient Patient Patient Patient   HIGHEST LEVEL OF EDUCATION - PRIMARY LEARNER  - - - 4 Luis Antonio LEARNER - NONE - COGNITIVE   CO-LEARNER CAREGIVER - No - -   PRIMARY LANGUAGE ENGLISH ENGLISH ENGLISH ENGLISH   LEARNER PREFERENCE PRIMARY DEMONSTRATION DEMONSTRATION VIDEOS READING     - - - VIDEOS   ANSWERED BY patient  patient patient patient   RELATIONSHIP SELF SELF SELF SELF       Depression Screening:  :     3 most recent PHQ Screens 2018   Little interest or pleasure in doing things Nearly every day   Feeling down, depressed, irritable, or hopeless More than half the days   Total Score PHQ 2 5       Fall Risk Assessment:  :     Fall Risk Assessment, last 12 mths 2018   Able to walk? Yes   Fall in past 12 months? Yes   Fall with injury? Yes   Number of falls in past 12 months 5   Fall Risk Score 6       Abuse Screening:  :     Abuse Screening Questionnaire 2018 2015 3/10/2014   Do you ever feel afraid of your partner?  N N N   Are you in a relationship with someone who physically or mentally threatens you? N N N   Is it safe for you to go home? Y Y Y       Coordination of Care Questionnaire:  :     1) Have you been to an emergency room, urgent care clinic since your last visit? no   Hospitalized since your last visit? no             2) Have you seen or consulted any other health care providers outside of [address] [place] [address] since your last visit? no  (Include any pap smears or colon screenings in this section.)    3) Do you have an Advance Directive on file? no  Are you interested in receiving information about Advance Directives? no    Reviewed record in preparation for visit and have obtained necessary documentation. Medication reconciliation up to date and corrected with patient at this time.

## 2020-02-13 ENCOUNTER — HOSPITAL ENCOUNTER (OUTPATIENT)
Dept: MAMMOGRAPHY | Age: 60
Discharge: HOME | End: 2020-02-13
Attending: FAMILY MEDICINE
Payer: MEDICARE

## 2020-02-13 DIAGNOSIS — Z78.0 ASYMPTOMATIC MENOPAUSAL STATE: ICD-10-CM

## 2020-02-13 PROCEDURE — 77080 DXA BONE DENSITY AXIAL: CPT

## 2020-03-08 ENCOUNTER — HOSPITAL ENCOUNTER (EMERGENCY)
Age: 60
Discharge: HOME | End: 2020-03-08
Attending: EMERGENCY MEDICINE | Admitting: EMERGENCY MEDICINE
Payer: MEDICARE

## 2020-03-08 VITALS
SYSTOLIC BLOOD PRESSURE: 118 MMHG | DIASTOLIC BLOOD PRESSURE: 61 MMHG | WEIGHT: 225 LBS | BODY MASS INDEX: 37.49 KG/M2 | RESPIRATION RATE: 14 BRPM | HEART RATE: 77 BPM | TEMPERATURE: 98.3 F | OXYGEN SATURATION: 99 % | HEIGHT: 65 IN

## 2020-03-08 DIAGNOSIS — G89.29 OTHER CHRONIC PAIN: ICD-10-CM

## 2020-03-08 DIAGNOSIS — J02.9 ACUTE PHARYNGITIS, UNSPECIFIED ETIOLOGY: Primary | ICD-10-CM

## 2020-03-08 LAB
FLUAV AG NPH QL IA: NEGATIVE
FLUBV AG NOSE QL IA: NEGATIVE

## 2020-03-08 PROCEDURE — 87804 INFLUENZA ASSAY W/OPTIC: CPT

## 2020-03-08 PROCEDURE — 74011250637 HC RX REV CODE- 250/637: Performed by: EMERGENCY MEDICINE

## 2020-03-08 PROCEDURE — 99282 EMERGENCY DEPT VISIT SF MDM: CPT

## 2020-03-08 RX ORDER — TRAMADOL HYDROCHLORIDE 50 MG/1
50 TABLET ORAL
Status: COMPLETED | OUTPATIENT
Start: 2020-03-08 | End: 2020-03-08

## 2020-03-08 RX ORDER — ACETAMINOPHEN 500 MG
1000 TABLET ORAL ONCE
Status: COMPLETED | OUTPATIENT
Start: 2020-03-08 | End: 2020-03-08

## 2020-03-08 RX ORDER — DEXAMETHASONE SODIUM PHOSPHATE 10 MG/ML
10 INJECTION INTRAMUSCULAR; INTRAVENOUS
Status: COMPLETED | OUTPATIENT
Start: 2020-03-08 | End: 2020-03-08

## 2020-03-08 RX ADMIN — ACETAMINOPHEN 1000 MG: 500 TABLET ORAL at 21:22

## 2020-03-08 RX ADMIN — DEXAMETHASONE SODIUM PHOSPHATE 10 MG: 10 INJECTION, SOLUTION INTRAMUSCULAR; INTRAVENOUS at 21:22

## 2020-03-08 RX ADMIN — TRAMADOL HYDROCHLORIDE 50 MG: 50 TABLET, FILM COATED ORAL at 21:22
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
## 2020-03-09 NOTE — DISCHARGE INSTRUCTIONS
But was negative for influenza. We gave you steroids for your sore throat, please use salt water gargles for relief. Please follow-up with your primary care doctor, who can refer you to pain management for your chronic pain. Thank you.

## 2020-03-09 NOTE — ED PROVIDER NOTES
Patient is a 66-year-old female with history of mental illness, fibromyalgia, chronic pain, GERD who presents with acute exacerbation of chronic all over pain, and sore throat that started this morning. Patient reports she has seen 5 doctors in the last week that her with her chronic pain. They keep referring her to the neck specialist who is unable to help. Patient reports sore throat that started this morning, accompanied by a frontal headache. Headache is gradual onset, similar to prior, no relief with NSAIDs at home. Reports she took flu shot this year. Gait. Past Medical History:   Diagnosis Date    ADD (attention deficit disorder)     Anemia     Anxiety     Autoimmune disease ([address]     fibromyalgia    Burn     Shoulder, back and buttocks  from use of heating pad.     CAD (coronary artery disease)     MI in 1994    Chronic pain     Followed by Dr. Dariana Richardson Chronic pain     Fibromyalgia    Dysthymic disorder 11/9/2012    PTSD, memory loss short term /  dementia depression    Fibromyalgia     GERD (gastroesophageal reflux disease)     rarely    Herniated cervical disc     Ill-defined condition     lymphedema in legs    Ill-defined condition     dementia    Kidney stone     hx frequently    Lumbago     Lymphedema     uses compression stockings in daytime, flexitouch pump at night    Lymphedema     Memory loss 1/17/2011    Memory loss, short term     MVP (mitral valve prolapse)     Nausea & vomiting     after general anesthesia    PCOS (polycystic ovarian syndrome)     PTSD (post-traumatic stress disorder)     Pulmonary embolism ([facility] [address] X2    Associated with surgery X2    Scoliosis     Sinus infection     saw PCP 3/15/19  started treatment 3/18/18    Stroke [facility])     april 2014    Sun-damaged skin     Sunburn, blistering     Tanning bed exposure     TIA on medication     TIA L hemiparesis - Was on diet pills 2015    TMJ (dislocation of temporomandibular joint)     Vitamin B 12 deficiency     resolved    Vitamin D deficiency        Past Surgical History:   Procedure Laterality Date    CARDIAC SURG PROCEDURE UNLIST      catheterization - heart attack, mitral valve prolapse    COLONOSCOPY N/A 3/15/2019    COLONOSCOPY-no info to  performed by Richard Vaughan MD at [address] [facility] HX BREAST AUGMENTATION Bilateral     [facility]    , PCOS    HX DILATION AND CURETTAGE  2019    H/S, D+C with myosure--normal path with endometrial polyp    HX GASTRIC BYPASS  1999    [facility]: open gastric bypass, naseem,  umb hernia repair, tr. vagotomy    HX ORTHOPAEDIC  [id number],    bunion surgery, heel spur surgery    HX TONSILLECTOMY      As a child          Family History:   Problem Relation Age of Onset    Hypertension Mother     Ovarian Cancer Mother         Spread to Throat and [address] Mother 61        unknown type    Diabetes Father     Stroke Father     Hypertension Father     Breast Cancer Sister     Breast Cancer Maternal Grandmother         Passed away from COPD - Former smoker     COPD Maternal Grandmother     Cancer Maternal Grandmother 79        colon       Social History     Socioeconomic History    Marital status:      Spouse name: Not on file    Number of children: Not on file    Years of education: Not on file    Highest education level: Not on file   Occupational History    Not on file   Social Needs    Financial resource strain: Not on file    Food insecurity:     Worry: Not on file     Inability: Not on file    Transportation needs:     Medical: Not on file     Non-medical: Not on file   Tobacco Use    Smoking status: Never Smoker    Smokeless tobacco: Never Used    Tobacco comment: Never used vapor or e-cigs    Substance and Sexual Activity    Alcohol use: No     Alcohol/week: 0.0 standard drinks    Drug use: No    Sexual activity: Not Currently     Partners: Male     Birth control/protection: None     Comment:    Lifestyle    Physical activity:     Days per week: Not on file     Minutes per session: Not on file    Stress: Not on file   Relationships    Social connections:     Talks on phone: Not on file     Gets together: Not on file     Attends Tenri[employer] service: Not on file     Active member of club or organization: Not on file     Attends meetings of clubs or organizations: Not on file     Relationship status: Not on file    Intimate partner violence:     Fear of current or ex partner: Not on file     Emotionally abused: Not on file     Physically abused: Not on file     Forced sexual activity: Not on file   Other Topics Concern     Service No    Blood Transfusions Yes     Comment: unknown    Caffeine Concern No    Occupational Exposure No    Hobby Hazards No    Sleep Concern Yes    Stress Concern Yes    Weight Concern Yes    Special Diet No    Back Care No    Exercise No    Bike Helmet No    Seat Belt Yes    Self-Exams Yes   Social History Narrative    Not on file         ALLERGIES: Bee sting [sting, bee]; Morphine; and Vicodin [hydrocodone-acetaminophen]    Review of Systems   Constitutional: Negative for chills, fatigue and fever. HENT: Positive for sore throat. Negative for congestion, drooling, ear discharge, ear pain, nosebleeds, sinus pressure, sinus pain, trouble swallowing and voice change. Eyes: Negative for pain and itching. Respiratory: Negative for choking and stridor. Cardiovascular: Negative for leg swelling. Gastrointestinal: Negative for abdominal distention and rectal pain. Endocrine: Negative for heat intolerance and polyphagia. Genitourinary: Negative for enuresis and genital sores. Musculoskeletal: Negative for arthralgias and joint swelling. Skin: Negative for color change. Allergic/Immunologic: Negative for immunocompromised state. Neurological: Positive for headaches.  Negative for tremors, speech difficulty and weakness. Hematological: Negative for adenopathy. Psychiatric/Behavioral: Negative for dysphoric mood and sleep disturbance. Vitals:    03/08/20 1959   BP: 118/61   Pulse: 77   Resp: 14   Temp: 98.3 °F (36.8 °C)   SpO2: 99%   Weight: 102.1 kg (225 lb)   Height: 5' 5\" (1.651 m)            Physical Exam  Vitals signs and nursing note reviewed. Constitutional:       General: She is not in acute distress. Appearance: She is well-developed. She is not ill-appearing, toxic-appearing or diaphoretic. HENT:      Head: Normocephalic. Nose: Nose normal.      Mouth/Throat:      Mouth: Mucous membranes are moist.      Pharynx: Posterior oropharyngeal erythema present. No oropharyngeal exudate. Comments: No tonsillar enlargement, no tonsillar exudates. Uvula midline. Eyes:      Conjunctiva/sclera: Conjunctivae normal.   Neck:      Musculoskeletal: Normal range of motion and neck supple. Cardiovascular:      Rate and Rhythm: Normal rate and regular rhythm. Heart sounds: Normal heart sounds. Pulmonary:      Effort: Pulmonary effort is normal. No respiratory distress. Breath sounds: Normal breath sounds. Abdominal:      General: There is no distension. Palpations: Abdomen is soft. Musculoskeletal: Normal range of motion. General: No deformity. Skin:     General: Skin is warm and dry. Neurological:      Mental Status: She is alert and oriented to person, place, and time. Cranial Nerves: No cranial nerve deficit. Sensory: No sensory deficit. Motor: No weakness. Coordination: Coordination normal.      Gait: Gait normal.   Psychiatric:         Behavior: Behavior normal.          MDM  Number of Diagnoses or Management Options  Acute pharyngitis, unspecified etiology:   Other chronic pain:   Diagnosis management comments: 4 pharyngitis. Flu negative at this time. Relief with Tylenol for headache.   Discussed with patient that I would be unable to provide prescription for her chronic pain symptoms that are unchanged at this time. Will give single dose of tramadol for relief. Is to continue following up with primary care doctor and pain management for chronic pain relief. She verbalized understanding. Stable for discharge at this time. Procedures    Patient's results have been reviewed with them. Patient and/or family have verbally conveyed their understanding and agreement of the patient's signs, symptoms, diagnosis, treatment and prognosis and additionally agree to follow up as recommended or return to the Emergency Room should their condition change prior to follow-up. Discharge instructions have also been provided to the patient with some educational information regarding their diagnosis as well a list of reasons why they would want to return to the ER prior to their follow-up appointment should their condition change.

## 2020-03-11 RX ORDER — GABAPENTIN 400 MG/1
CAPSULE ORAL
Qty: 180 CAP | Refills: 2 | OUTPATIENT
Start: 2020-03-11

## 2020-03-23 ENCOUNTER — OFFICE VISIT (OUTPATIENT)
Dept: NEUROLOGY | Age: 60
End: 2020-03-23

## 2020-03-23 VITALS
SYSTOLIC BLOOD PRESSURE: 136 MMHG | WEIGHT: 234 LBS | TEMPERATURE: 98.1 F | RESPIRATION RATE: 20 BRPM | HEART RATE: 90 BPM | OXYGEN SATURATION: 6 % | DIASTOLIC BLOOD PRESSURE: 94 MMHG | HEIGHT: 65 IN | BODY MASS INDEX: 38.99 KG/M2

## 2020-03-23 NOTE — PROGRESS NOTES
Chief Complaint   Patient presents with    Memory Loss     LOV with Dr. Francena Schirmer was in 2017, states she never rec'd results for the testing though her LOV with him was for feedback of testing done in Aug of 2017

## 2020-03-23 NOTE — PROGRESS NOTES
[facility] and [address] at [facility] at [address] [facility], [address] [facility] [address] [facility], [address] [place] [address]   [phone]              Chief Complaint   Patient presents with    Memory Loss     Current Outpatient Medications   Medication Sig Dispense Refill    FLUoxetine (PROZAC) 40 mg capsule Take 1 Cap by mouth daily. 90 Cap 1    tiZANidine (ZANAFLEX) 4 mg tablet Take 1 Tab by mouth two (2) times a day.  ALPRAZolam (XANAX) 1 mg tablet TAKE 1 & 1/2 (ONE & ONE-HALF) TABLETS BY MOUTH TWICE DAILY AS NEEDED FOR ANXIETY OR  SLEEP. MAX  DAILY  AMOUNT  IS  3MG 270 Tab 0    ondansetron (ZOFRAN ODT) 8 mg disintegrating tablet DISSOLVE 1 TABLET IN MOUTH EVERY 8 HOURS AS NEEDED FOR NAUSEA 20 Tab 5    losartan (COZAAR) 50 mg tablet Take 1 Tab by mouth daily.  bumetanide (BUMEX) 0.5 mg tablet Take 0.5 mg by mouth daily.  promethazine (PHENERGAN) 25 mg tablet Take 1 Tab by mouth every eight (8) hours as needed for Nausea. 120 Tab 1    gabapentin (NEURONTIN) 400 mg capsule Take 2 Caps by mouth three (3) times daily. 540 Cap 1    meloxicam (MOBIC) 7.5 mg tablet Take 1 Tab by mouth two (2) times a day.  DULoxetine (CYMBALTA) 60 mg capsule TAKE ONE CAPSULE BY MOUTH TWICE DAILY (Patient taking differently: Take 60 mg by mouth daily. ) 180 Cap 1    montelukast (SINGULAIR) 10 mg tablet Take 1 Tab by mouth daily. 90 Tab 1    SSD 1 % topical cream APPLY TO AFFECTED AREA DAILY 50 g 3    NYSTOP powder APPLY  POWDER TOPICALLY 4 TIMES DAILY TO AFFECTED AREA FOR 30 DAYS 60 g 5    ibuprofen (MOTRIN) 600 mg tablet TAKE 1 TABLET BY MOUTH EVERY 6 HOURS AS NEEDED FOR PAIN 90 Tab 1    nystatin (MYCOSTATIN) topical cream Apply  to affected area two (2) times daily as needed for Skin Irritation or Itching. 30 g 5    diphenhydrAMINE (BENADRYL) 25 mg capsule Take 50 mg by mouth nightly.       Calcium Carbonate-Vit D3-Min 600 mg calcium- 400 unit tab Take 1,200 Tabs by mouth Daily (before breakfast).  OTHER Take 2 Tabs by mouth two (2) times a day. Medication call \"Leg Cramps\"  PTC taken 2x daily for severe leg cramps      prenatal 24/PAOV fum/folic/dha (PRENATAL-1 PO) Take 1 Tab by mouth Daily (before breakfast).  EPINEPHrine (EPIPEN) 0.3 mg/0.3 mL injection epinephrine 0.3 mg/0.3 mL injection, auto-injector      naloxone (NARCAN) 4 mg/actuation nasal spray Use 1 spray intranasally, then discard. Repeat with new spray every 2 min as needed for opioid overdose symptoms, alternating nostrils. 2 Each 0      Allergies   Allergen Reactions    Bee Sting [Sting, Bee] Anaphylaxis    Morphine Other (comments)     Severe stomach cramps     Vicodin [Hydrocodone-Acetaminophen] Hives     Social History     Tobacco Use    Smoking status: Never Smoker    Smokeless tobacco: Never Used    Tobacco comment: Never used vapor or e-cigs    Substance Use Topics    Alcohol use: No     Alcohol/week: 0.0 standard drinks    Drug use: No     Patient returns today for follow-up. She is a 51-year-old woman who was seen by Dr. Henry Earing  back in 2015 for complaints of memory difficulty. He sent her to Dr. Anthony Waddell for evaluation. There is an office feedback session October 2017 where Dr. Anthony Waddell met with her to discuss the results which were normal inconsistent with dementia and inconsistent with any residual from a stroke but he did find some chronic attention deficit issues. She comes today as a follow-up appointment with me and I have never seen her. She has no new complaint but she comes stating she wants the results of her neuropsychological testing. Her results and her follow-up test note were hence printed for her and the PSR's were instructed to return her co-pay.   No services rendered by me today    China Hutson MD        Examination  Visit Vitals  BP (!) 136/94 (BP 1 Location: Left arm, BP Patient Position: Sitting)   Pulse 90   Temp 98.1 °F (36.7 °C)   Resp 20   Ht 5' 5\" (1.651 m)   Wt 106.1 kg (234 lb)   LMP 07/15/2011   SpO2 (!) 6%   BMI 38.94 kg/m²         Impression/Plan      Segun Moura MD      This note was created using voice recognition software. Despite editing, there may be syntax errors.

## 2020-03-25 ENCOUNTER — TELEPHONE (OUTPATIENT)
Dept: NEUROLOGY | Age: 60
End: 2020-03-25

## 2020-03-25 NOTE — TELEPHONE ENCOUNTER
----- Message from Ayush Ruby Page sent at 3/25/2020 11:29 AM EDT -----  Regarding: Dr. Armando Lan Telephone  Appointment not available:    : 53-   ID numbers: #[id number]  Caller's first and last name and relationship to patient (if not the patient): n/a  Best contact [phone]  Preferred date and time:  Later in the week   Scheduled appointment date and time:   2020 12:00 PM  Reason for appointment:    clearance for spinal stimulator  Details to clarify the request: Patient is sicjk and cannot make it in for her appointment.  Please call to reschedule or to advise how forms can be filled out

## 2020-04-13 RX ORDER — LOSARTAN POTASSIUM AND HYDROCHLOROTHIAZIDE 12.5; 5 MG/1; MG/1
1 TABLET ORAL DAILY
OUTPATIENT
Start: 2020-04-13

## 2020-04-13 RX ORDER — GABAPENTIN 400 MG/1
800 CAPSULE ORAL 3 TIMES DAILY
Qty: 540 CAP | Refills: 1 | OUTPATIENT
Start: 2020-04-13

## 2020-04-14 DIAGNOSIS — F41.9 ANXIETY: ICD-10-CM

## 2020-04-14 RX ORDER — EPINEPHRINE 0.3 MG/.3ML
0.3 INJECTION SUBCUTANEOUS
Qty: 2 SYRINGE | Refills: 1 | Status: SHIPPED | OUTPATIENT
Start: 2020-04-14 | End: 2020-04-14

## 2020-04-17 RX ORDER — ALPRAZOLAM 1 MG/1
TABLET ORAL
Qty: 270 TAB | Refills: 0 | Status: SHIPPED | OUTPATIENT
Start: 2020-04-17

## 2020-05-12 ENCOUNTER — VIRTUAL VISIT (OUTPATIENT)
Dept: FAMILY MEDICINE CLINIC | Age: 60
End: 2020-05-12

## 2020-05-12 VITALS — HEIGHT: 65 IN | BODY MASS INDEX: 38.94 KG/M2

## 2020-05-12 DIAGNOSIS — F98.8 ATTENTION DEFICIT DISORDER, UNSPECIFIED HYPERACTIVITY PRESENCE: ICD-10-CM

## 2020-05-12 DIAGNOSIS — F33.2 SEVERE RECURRENT MAJOR DEPRESSION WITHOUT PSYCHOTIC FEATURES (HCC): Primary | ICD-10-CM

## 2020-05-12 DIAGNOSIS — Z91.030 BEE STING ALLERGY: ICD-10-CM

## 2020-05-12 PROBLEM — S83.249A TEAR OF MEDIAL MENISCUS OF KNEE: Status: ACTIVE | Noted: 2019-11-15

## 2020-05-12 PROBLEM — F43.10 POSTTRAUMATIC STRESS DISORDER: Status: ACTIVE | Noted: 2020-05-12

## 2020-05-12 PROBLEM — A49.02 MRSA (METHICILLIN RESISTANT STAPHYLOCOCCUS AUREUS): Status: ACTIVE | Noted: 2020-05-12

## 2020-05-12 PROBLEM — F32.A DEPRESSION: Status: ACTIVE | Noted: 2020-05-12

## 2020-05-12 PROBLEM — F41.8 MIXED ANXIETY AND DEPRESSIVE DISORDER: Status: ACTIVE | Noted: 2017-08-23

## 2020-05-12 PROBLEM — M70.60 TROCHANTERIC BURSITIS: Status: ACTIVE | Noted: 2019-09-19

## 2020-05-12 PROBLEM — M17.9 OSTEOARTHRITIS OF KNEE: Status: ACTIVE | Noted: 2017-01-09

## 2020-05-12 RX ORDER — DEXTROAMPHETAMINE SACCHARATE, AMPHETAMINE ASPARTATE, DEXTROAMPHETAMINE SULFATE, AND AMPHETAMINE SULFATE 2.5; 2.5; 2.5; 2.5 MG/1; MG/1; MG/1; MG/1
10 TABLET ORAL 2 TIMES DAILY
COMMUNITY
End: 2020-05-12

## 2020-05-12 RX ORDER — PREDNISONE 20 MG/1
TABLET ORAL
COMMUNITY
End: 2020-05-12

## 2020-05-12 RX ORDER — DEXTROAMPHETAMINE SACCHARATE, AMPHETAMINE ASPARTATE, DEXTROAMPHETAMINE SULFATE AND AMPHETAMINE SULFATE 7.5; 7.5; 7.5; 7.5 MG/1; MG/1; MG/1; MG/1
30 TABLET ORAL 2 TIMES DAILY
Qty: 60 TAB | Refills: 0 | Status: SHIPPED | OUTPATIENT
Start: 2020-05-12 | End: 2020-06-11

## 2020-05-12 RX ORDER — EPINEPHRINE 0.3 MG/.3ML
1 INJECTION SUBCUTANEOUS ONCE
COMMUNITY
Start: 2018-09-12

## 2020-05-12 RX ORDER — BUPROPION HYDROCHLORIDE 150 MG/1
TABLET, EXTENDED RELEASE ORAL
Qty: 180 TAB | Refills: 1 | Status: SHIPPED | OUTPATIENT
Start: 2020-05-12

## 2020-05-12 RX ORDER — LIDOCAINE 50 MG/G
PATCH TOPICAL
COMMUNITY

## 2020-05-12 RX ORDER — CYCLOBENZAPRINE HCL 10 MG
10 TABLET ORAL
COMMUNITY

## 2020-05-12 RX ORDER — PREDNISONE 20 MG/1
20 TABLET ORAL 2 TIMES DAILY
Qty: 10 TAB | Refills: 1 | Status: SHIPPED | OUTPATIENT
Start: 2020-05-12 | End: 2020-08-08

## 2020-05-12 NOTE — PROGRESS NOTES
Identified pt with two pt identifiers(name and ).     Chief Complaint   Patient presents with    Depression    Attention Deficit Disorder     needs refill adderall and would like to discuss increasing or changing med    Bee sting     needs steroid refilled that she uses prn for bee stings    Pain (Chronic)     reports tizanidine was stopped and she was started on flexeril, however, does not feel that is working well and would like to go back to a higher dose of tizanidine - reports her \"pain doctor up and quit on her a month ago\"       Health Maintenance Due   Topic    Shingrix Vaccine Age 50> (1 of 2)    A1C test (Diabetic or Prediabetic)     Breast Cancer Screen Mammogram        Wt Readings from Last 3 Encounters:   20 234 lb (106.1 kg)   20 225 lb (102.1 kg)   20 233 lb (105.7 kg)     Temp Readings from Last 3 Encounters:   20 98.1 °F (36.7 °C)   20 98.3 °F (36.8 °C)   20 98.7 °F (37.1 °C) (Oral)     BP Readings from Last 3 Encounters:   20 (!) 136/94   20 118/61   20 116/68     Pulse Readings from Last 3 Encounters:   20 90   20 77   20 (!) 54         Learning Assessment:  :     Learning Assessment 2016 2015 2015 1/3/2014   PRIMARY LEARNER Patient Patient Patient Patient   HIGHEST LEVEL OF EDUCATION - PRIMARY LEARNER  - - - 4 Luis Antonio LEARNER - NONE - COGNITIVE   CO-LEARNER CAREGIVER - No - -   PRIMARY LANGUAGE ENGLISH ENGLISH ENGLISH ENGLISH   LEARNER PREFERENCE PRIMARY DEMONSTRATION DEMONSTRATION VIDEOS READING     - - - VIDEOS   ANSWERED BY patient  patient patient patient   RELATIONSHIP SELF SELF SELF SELF       Depression Screening:  :     3 most recent PHQ Screens 2020   Little interest or pleasure in doing things Not at all   Feeling down, depressed, irritable, or hopeless Not at all   Total Score PHQ 2 0       Fall Risk Assessment:  :     Fall Risk Assessment, last 12 mths 7/23/2018   Able to walk? Yes   Fall in past 12 months? Yes   Fall with injury? Yes   Number of falls in past 12 months 5   Fall Risk Score 6       Abuse Screening:  :     Abuse Screening Questionnaire 5/12/2020 7/23/2018 6/30/2015 3/10/2014   Do you ever feel afraid of your partner? N N N N   Are you in a relationship with someone who physically or mentally threatens you? N N N N   Is it safe for you to go home? Y Y Y Y       Coordination of Care Questionnaire:  :     1) Have you been to an emergency room, urgent care clinic since your last visit? no   Hospitalized since your last visit? no             2) Have you seen or consulted any other health care providers outside of [address] [place] [address] since your last visit? no  (Include any pap smears or colon screenings in this section.)    3) Do you have an Advance Directive on file? no  Are you interested in receiving information about Advance Directives? no      Reviewed record in preparation for visit and have obtained necessary documentation. Medication reconciliation up to date and corrected with patient at this time.

## 2020-05-12 NOTE — PROGRESS NOTES
Ramsey Rosales is a 61 y.o. female who was seen by synchronous (real-time) audio-video technology on 5/12/2020. Consent: Ramsey Rosales, who was seen by synchronous (real-time) audio-video technology, and/or her healthcare decision maker, is aware that this patient-initiated, Telehealth encounter on 5/12/2020 is a billable service, with coverage as determined by her insurance carrier. She is aware that she may receive a bill and has provided verbal consent to proceed: Yes. Assessment & Plan:   Diagnoses and all orders for this visit:    1. Severe recurrent major depression without psychotic features ([facility] [address]): worsening, denies SI/HI. Add Wellbutrin to regimen and titrate based upon effectiveness and tolerability. If no improvement, would have her reestablish care with Psychiatry. -     buPROPion SR (WELLBUTRIN SR) 150 mg SR tablet; Take 1 tablet by mouth daily for 3 days, then increase to 1 tablet twice daily. 2. Attention deficit disorder, unspecified hyperactivity presence: start taking medication twice daily as prescribed. -     dextroamphetamine-amphetamine (ADDERALL) 30 mg tablet; Take 1 Tab by mouth two (2) times a day for 30 days. Max Daily Amount: 2 Tabs. 3. Bee sting allergy  -     predniSONE (DELTASONE) 20 mg tablet; Take 20 mg by mouth two (2) times a day. For 5 days for bee sting. Take with food. Subjective:   Ramsey Rosales is a 61 y.o. female who was seen for Depression; Attention Deficit Disorder (needs refill adderall and would like to discuss increasing or changing med); Bee sting (needs steroid refilled that she uses prn for bee stings); and Pain (Chronic) (reports tizanidine was stopped and she was started on flexeril, however, does not feel that is working well and would like to go back to a higher dose of tizanidine - reports her \"pain doctor up and quit on her a month ago\")    \"I'm struggling hard with my depression\". Feels fatigued, lack of motivation, lack of energy. Denies SI/HI. Onset was approximately 6 months ago. Reports compliance with current medications. Previously under the care of Dr. Shea Mccarthy. Have discussed establishing with new Psychiatrist, but had reported having difficultly finding provider who accepts insurance. - Has appointment with Dr. Burak Alvarenga scheduled for 5/13/2020 for psychological evaluation for spinal cord stimulator  - States that Adderall has not been effective. Admits that she does not take daily as prescribed. Takes on a as needed basis if she needs to complete tasks or has an appointment or something that she needs to remember. Chronic lumbar back pain:   - Evaluated by Orthopedics and Pain Management and reports that she is not a surgical candidate. Has had PAMELA with Dr. Tawanna Kinney and states that she has had all the injections that she can for the next 12 months. Last seen by Dr. Minerva Mendoza and no prescribing relationship established. Currently seeing Dr. Lenka Kenney for pain management; last seen on 4/17/2020 and provided with Rx for Nucynta. States that Dr. Yehuda Segura is not handling any primary care needs. Prior to Admission medications    Medication Sig Start Date End Date Taking? Authorizing Provider   tapentadoL (Nucynta) 50 mg tablet Nucynta 50 mg tablet  Take 1 tablet 3 times a day by oral route as needed   Yes Provider, Historical   EPINEPHrine (EPIPEN) 0.3 mg/0.3 mL injection 1 Syringe by SubCUTAneous route once. 1 Syringe by SubCUTAneous route once for anaphalaxis for bee stings 9/12/18  Yes Provider, Historical   cyclobenzaprine (FLEXERIL) 10 mg tablet Take 10 mg by mouth two (2) times daily as needed. Yes Provider, Historical   lidocaine (Lidoderm) 5 % Lidoderm 5 % topical patch  APPLY 1 PATCH TOPICALLY ONCE DAILY. MAY WEAR UP TO 12 HOURS   Yes Provider, Historical   dextroamphetamine-amphetamine (AdderalL) 10 mg tablet Take 10 mg by mouth two (2) times a day.  Last script in [facility] care was 2016    Yes Provider, Historical ALPRAZolam (XANAX) 1 mg tablet TAKE 1 & 1/2 TABLETS BY MOUTH TWICE DAILY AS NEEDED FOR ANXIETY OR SLEEP. MAX DAILY AMOUNT IS 3MG 4/17/20  Yes Osmar Starr MD   FLUoxetine (PROZAC) 40 mg capsule Take 1 Cap by mouth daily. 2/5/20  Yes Osmar Starr MD   ondansetron (ZOFRAN ODT) 8 mg disintegrating tablet DISSOLVE 1 TABLET IN MOUTH EVERY 8 HOURS AS NEEDED FOR NAUSEA 1/22/20  Yes Osmar Starr MD   losartan (COZAAR) 50 mg tablet Take 1 Tab by mouth daily. 1/2/20  Yes Provider, Historical   bumetanide (BUMEX) 0.5 mg tablet Take 0.5 mg by mouth daily. PRN FOR SWELLING   Yes Provider, Historical   promethazine (PHENERGAN) 25 mg tablet Take 1 Tab by mouth every eight (8) hours as needed for Nausea. 12/20/19  Yes Flower Maurice MD   gabapentin (NEURONTIN) 400 mg capsule Take 2 Caps by mouth three (3) times daily. 11/12/19  Yes Osmar Starr MD   meloxicam (MOBIC) 7.5 mg tablet Take 1 Tab by mouth two (2) times a day. 10/14/19  Yes Provider, Historical   DULoxetine (CYMBALTA) 60 mg capsule TAKE ONE CAPSULE BY MOUTH TWICE DAILY  Patient taking differently: Take 60 mg by mouth daily. 10/15/19  Yes Osmar Starr MD   montelukast (SINGULAIR) 10 mg tablet Take 1 Tab by mouth daily. 10/15/19  Yes Osmar Starr MD   SSD 1 % topical cream APPLY TO AFFECTED AREA DAILY 9/5/19  Yes Osmar Starr MD   NYSTOP powder APPLY  POWDER TOPICALLY 4 TIMES DAILY TO AFFECTED AREA FOR 30 DAYS 9/5/19  Yes Osmar Starr MD   ibuprofen (MOTRIN) 600 mg tablet TAKE 1 TABLET BY MOUTH EVERY 6 HOURS AS NEEDED FOR PAIN 6/18/19  Yes Osmar Starr MD   nystatin (MYCOSTATIN) topical cream Apply  to affected area two (2) times daily as needed for Skin Irritation or Itching. 5/2/19  Yes Osmar Starr MD   diphenhydrAMINE (BENADRYL) 25 mg capsule Take 50 mg by mouth nightly. Yes Provider, Historical   Calcium Carbonate-Vit D3-Min 600 mg calcium- 400 unit tab Take 1,200 Tabs by mouth Daily (before breakfast). Yes Provider, Historical   OTHER Take 2 Tabs by mouth two (2) times a day. Medication call \"Leg Cramps\"  PTC taken 2x daily for severe leg cramps   Yes Provider, Historical   prenatal 10/QYAK fum/folic/dha (PRENATAL-1 PO) Take 1 Tab by mouth Daily (before breakfast). Yes Other, MD Maddy   predniSONE (DELTASONE) 20 mg tablet PRN for bee stings  5/12/20  Provider, Historical   tiZANidine (ZANAFLEX) 4 mg tablet Take 1 Tab by mouth two (2) times a day. 2/2/20 5/12/20  Provider, Historical   naloxone (NARCAN) 4 mg/actuation nasal spray Use 1 spray intranasally, then discard. Repeat with new spray every 2 min as needed for opioid overdose symptoms, alternating nostrils. 1/17/20   Kory Araujo MD     Allergies   Allergen Reactions    Bee Sting [Sting, Bee] Anaphylaxis    Morphine Other (comments)     Severe stomach cramps     Vicodin [Hydrocodone-Acetaminophen] Hives       Allergies   Allergen Reactions    Bee Sting [Sting, Bee] Anaphylaxis    Morphine Other (comments)     Severe stomach cramps     Vicodin [Hydrocodone-Acetaminophen] Hives       Social History     Tobacco Use    Smoking status: Never Smoker    Smokeless tobacco: Never Used    Tobacco comment: Never used vapor or e-cigs    Substance Use Topics    Alcohol use: No     Alcohol/week: 0.0 standard drinks       ROS  Pertinent item noted in HPI    Objective:     Visit Vitals  Ht 5' 5\" (1.651 m)   LMP 07/15/2011   BMI 38.94 kg/m²      General: alert, cooperative, no distress   Mental  status: normal mood, behavior, speech, dress, motor activity, and thought processes, able to follow commands   HENT: NCAT   Neck: no visualized mass   Resp: no respiratory distress   Neuro: no gross deficits   Skin: no discoloration or lesions of concern on visible areas   Psychiatric: normal affect, consistent with stated mood, no evidence of hallucinations     We discussed the expected course, resolution and complications of the diagnosis(es) in detail.   Medication risks, benefits, costs, interactions, and alternatives were discussed as indicated. I advised her to contact the office if her condition worsens, changes or fails to improve as anticipated. She expressed understanding with the diagnosis(es) and plan. Erin Gilman is a 61 y.o. female who was evaluated by a video visit encounter for concerns as above. Patient identification was verified prior to start of the visit. A caregiver was present when appropriate. Due to this being a TeleHealth encounter (During JZSMN-35 public health emergency), evaluation of the following organ systems was limited: Vitals/Constitutional/EENT/Resp/CV/GI//MS/Neuro/Skin/Heme-Lymph-Imm. Pursuant to the emergency declaration under the [facility], 1135 waiver authority and the [employer] Act, this Virtual  Visit was conducted, with patient's (and/or legal guardian's) consent, to reduce the patient's risk of exposure to COVID-19 and provide necessary medical care. Services were provided through a video synchronous discussion virtually to substitute for in-person clinic visit. Patient and provider were located at their individual homes.       Hossein Guillermo MD

## 2020-05-13 ENCOUNTER — OFFICE VISIT (OUTPATIENT)
Dept: NEUROLOGY | Age: 60
End: 2020-05-13

## 2020-05-13 VITALS — TEMPERATURE: 97.7 F

## 2020-05-13 DIAGNOSIS — G31.84 MILD COGNITIVE IMPAIRMENT: Primary | ICD-10-CM

## 2020-05-13 DIAGNOSIS — F43.10 PTSD (POST-TRAUMATIC STRESS DISORDER): ICD-10-CM

## 2020-05-13 DIAGNOSIS — R41.3 MEMORY LOSS: ICD-10-CM

## 2020-05-13 DIAGNOSIS — R47.89 WORD FINDING DIFFICULTY: ICD-10-CM

## 2020-05-13 DIAGNOSIS — R41.840 INATTENTION: ICD-10-CM

## 2020-05-13 DIAGNOSIS — F43.23 ADJUSTMENT REACTION WITH ANXIETY AND DEPRESSION: ICD-10-CM

## 2020-05-13 NOTE — PROGRESS NOTES
[address] [facility],[address]Th [address]  [address]   [address] [place] [address] [facility]    [phone] Office   [phone] Fax      Neuropsychology    Initial Diagnostic Interview Note      Referral:  Tim Salazar MD    Jes Landers is a 61 y.o. right handed  female who was unaccompanied to the initial clinical interview on 5/13/20. Please refer to her medical records for details pertaining to her history. At the start of the appointment, I reviewed the patient's [facility] Epic Chart (including Media scanned in from previous providers) for the active Problem List, all pertinent Past Medical Hx, medications, recent radiologic and laboratory findings. In addition, I reviewed pt's documented Immunization Record and Encounter History. When I saw her last:    Jes Landers is a 64 y.o. right handed   female who was unaccompanied to the initial clinical interview on 8/4/17. Please refer to her medical records for details pertaining to her history. [facility] was down when I saw her so I am copying my notes here: Ms. Jennifer Adams is a right handed   female who was unaccompanied to the initial neuropych consult visit. She completed almost two years of college without history of previously diagnosed LD and/or receipt of special education services. She is not currently working. In 2009, she lost her mom, stepfather, father, and grandparents all passed away. Her world ended. Her grandparents were everything in her life. She has been on disability services initially for chronic pain. The last thing she remembers is when her grandmother passed away in her arms. She last remembers them putting her body in taking her away. She literally has no memory of any events between [id number]. 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   She has no memory of any of this.  No memory of essentially any event until 2015. Feels like she woke up around then. Her son says that the whole time he was waiting for that [facility] to come back. Since she has come off of a lot of medication she was taking. Currently, she still is not back to baseline and has progressive short term memory problems. She asks the same question over and over. Starts tasks and does not complete. Spouse said to tell me that she asked him the same question over and over on the way to [place] and back. She does not recall that question today. She drives but only within a limited radius. Has gotten lost multiple times, and got lost while driving here today. She does not need reminders for medications. She is not allowed to touch the checkbook, because they were overdrawn and things were getting shut off when she was in charge of it. Spouse writes everything for her on a calendar on the fridge but she forgets to look at the calendar. He keeps writing lists and things for her and even then she forgets. She is losing words in conversation. She had a hard attack in 1994 and a stroke in 2015. From 2015 to now, feels like memory issues are progressively getting worse.       She does not sleep unless she takes her Xanax. She puts stuff on the stove and forgets that it is there. She manages to make a lot of meals. She has a discord with the vacuum as she only does half the house and stops and doesnt know why. Moodwise, these days, she is on antidepressant and another medication was recently added, but doesnt know what that was. Mood is good now, with that extra medication. Before, she was crying all the time. Memory not better in the past two weeks. She did try to kill herself September 8th of last year. She ODd on every pill she had. Spouse found her. Spouse and daughter called 911 and woke up tied to bed with tube down her throat in ICU. She has not attempted before.   At the time, spouse and patient were . Boyfriend backed out of something and she left him there. She was upset that he never called. Son and DIL have not spoken to her in two years because of her emotional problems. Lots going on. She has been seeing a Psychiatrist since 2014. Saw Dr. Cem Vaughan and prior to that was seeing [facility]. She is now getting her meds from PCP. No talk therapy right now. Has seen three counselors in the past.  She wants to know how to fix this.       Appetite is fine. No changes in sense of smell. Cant taste anything. Balance is terrible, and she has falls often. She has bad knees. She did lose 80 pounds so far.       Does not drink alcohol, does not smoke. No history of psychosis. Once her grandmother passed away she was hearing music and its gone and doesnt hear it anymore. Her father had AD and so did her grandmother. No current legal troubles. She crochets and now forgets patterns and the movements associated with crocheting. Keeps losing count. She has gone through menopause and has no libido. She is scheduled to see OB soon regarding this.          Dx then included: This is a predominant normal range Neuropsychological Evaluation with respect to neurocognitive functioning. In this regard, the only impairment noted was for sustained visual attention. Otherwise, her mental status, verbal fluency, confrontation naming, working memory, processing speed, perceptual reasoning, verbal comprehension, executive functioning, and bilateral fine motor dexterity are normal.  From an emotional standpoint, there is severe PTSD with severe depression and anxiety. Her anxiety is significant to the degree whereby her ability to attend and to concentrate are compromised.                   In my opinion, this profile is inconsistent with dementia. It is also inconsistent with residua from CVA.   Aside from a chronic ADHD- Inattentive type concern, this is a normal range Neurocognitive profile. Instead, the day-to-day problems and decline are secondary to profound emotional distress secondary to PTSD. I suggest intensive psychiatric treatment to include long term psychiatric medication management for anxiety and depression along with active participation in intensive psychotherapy. Her risk for self-harm remains elevated and needs to be monitored by her mental health care providers. I hope she is reassured that this is not a dementing condition. Once mood improves, if attention continues to impact her functioning, then I would recommend consideration for an appropriate medication for attention if this is not medically contraindicated. I am not concerned about competency, day-to-day supervision, etc.  Baseline now established. Follow up prn. Clinical correlation is, of course, indicated. I will discuss these findings with the patient when she follows up with me in the near future. A follow up Neuropsychological Evaluation is indicated on a prn basis, especially if there are any cognitive and/or emotional changes.       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  She forgets the content of conversations. Did speak with PCP about this. She has chronic pain in the back. She is seeking SCS placement/clearance. Understands nuances of the procedure and explained it well to me. Has reasonable expectations. She was on Adderall, which was not helping, and starting yesterday her dose has double.   She remains independent for medications, finances, day-to-day chores, etc.  Spouse has to remind her of things. Welbutrin has been added to her Prozac. She is on neurontin as well. She is on all the same medicines as before. She has worsening memory. She saw Dr. Cameron Carpenter, and has been seeing him for a number of years now. He was discharged from practice after she and the PA had a disagreement. Pain is miserable. Lymphedema out of control, since at least June of last year. She pumps every day. Wears her leggings. No counseling or psychiatrist currently. She falls often. She is on high dosages of many of these meds. Sees Dr. Bishop Dee also. Neuropsychological Mental Status Exam (NMSE):      Historian: Good  Praxis: No UE apraxia  R/L Orientation: Intact to self and to other  Dress: within normal limits   Weight: Overweight   Appearance/Hygiene: within normal limits   Gait: within normal limits   Assistive Devices: glasses  Mood: within normal limits   Affect: within normal limits   Comprehension: within normal limits   Thought Process: within normal limits   Expressive Language: within normal limits   Receptive Language: within normal limits   Motor:  No cognitive or motor perseveration  ETOH: Denied  Tobacco: Denied  Illicit: Denied  SI/HI: Denied  Psychosis: Denied  Insight: Within normal limits  Judgment: Within normal limits  Other Psych:      Past Medical History:   Diagnosis Date    ADD (attention deficit disorder)     Anemia     Anxiety     Autoimmune disease (HCC)     fibromyalgia    Burn     Shoulder, back and buttocks  from use of heating pad.     CAD (coronary artery disease)     MI in 1994    Chronic pain     Followed by Dr. Tequila Anders Chronic pain     Fibromyalgia    Dysthymic disorder 11/9/2012    PTSD, memory loss short term /  dementia depression    Fibromyalgia     GERD (gastroesophageal reflux disease)     rarely    Herniated cervical disc     Ill-defined condition     lymphedema in legs    Ill-defined condition     dementia    Kidney stone     hx frequently    Lumbago     Lymphedema     uses compression stockings in daytime, flexitouch pump at night    Lymphedema     Memory loss 2011    Memory loss, short term     MVP (mitral valve prolapse)     Nausea & vomiting     after general anesthesia    PCOS (polycystic ovarian syndrome)     PTSD (post-traumatic stress disorder)     Pulmonary embolism ([address] X2    Associated with surgery X2    Scoliosis     Sinus infection     saw PCP 3/15/19  started treatment 3/18/18    Stroke [facility])     2014    Sun-damaged skin     Sunburn, blistering     Tanning bed exposure     TIA on medication     TIA L hemiparesis - Was on diet pills     TMJ (dislocation of temporomandibular joint)     Vitamin B 12 deficiency     resolved    Vitamin D deficiency        Past Surgical History:   Procedure Laterality Date    CARDIAC SURG PROCEDURE UNLIST      catheterization - heart attack, mitral valve prolapse    COLONOSCOPY N/A 3/15/2019    COLONOSCOPY-no info to  performed by [facility]itra [facility]clure [facility] at [facility] HX BREAST AUGMENTATION Bilateral [facility]    , PCOS    HX DILATION AND CURETTAGE  2019    H/S, D+C with myosure--normal path with endometrial polyp    HX GASTRIC BYPASS  1999    [facility]: open gastric bypass, naseem,  umb hernia repair, tr. vagotomy    HX ORTHOPAEDIC  [id number],    bunion surgery, heel spur surgery    HX TONSILLECTOMY      As a child        Allergies   Allergen Reactions    Bee Sting [Sting, Bee] Anaphylaxis    Morphine Other (comments)     Severe stomach cramps     Vicodin [Hydrocodone-Acetaminophen] Hives       Family History   Problem Relation Age of Onset    Hypertension Mother     Ovarian Cancer Mother         Spread to Throat and Alysa Griffin Mother 61        unknown type    Diabetes Father     Stroke Father     Hypertension Father     Breast Cancer Sister     Breast Cancer Maternal Grandmother         Passed away from COPD - Former smoker     COPD Maternal Grandmother     Cancer Maternal Grandmother 79        colon       Social History     Tobacco Use    Smoking status: Never Smoker    Smokeless tobacco: Never Used    Tobacco comment: Never used vapor or e-cigs    Substance Use Topics    Alcohol use: No     Alcohol/week: 0.0 standard drinks    Drug use: No       Current Outpatient Medications   Medication Sig Dispense Refill    tapentadoL (Nucynta) 50 mg tablet Nucynta 50 mg tablet  Take 1 tablet 3 times a day by oral route as needed      EPINEPHrine (EPIPEN) 0.3 mg/0.3 mL injection 1 Syringe by SubCUTAneous route once. 1 Syringe by SubCUTAneous route once for anaphalaxis for bee stings      cyclobenzaprine (FLEXERIL) 10 mg tablet Take 10 mg by mouth two (2) times daily as needed.  lidocaine (Lidoderm) 5 % Lidoderm 5 % topical patch  APPLY 1 PATCH TOPICALLY ONCE DAILY. MAY WEAR UP TO 12 HOURS      buPROPion SR (WELLBUTRIN SR) 150 mg SR tablet Take 1 tablet by mouth daily for 3 days, then increase to 1 tablet twice daily. 180 Tab 1    dextroamphetamine-amphetamine (ADDERALL) 30 mg tablet Take 1 Tab by mouth two (2) times a day for 30 days. Max Daily Amount: 2 Tabs. 60 Tab 0    predniSONE (DELTASONE) 20 mg tablet Take 20 mg by mouth two (2) times a day. For 5 days for bee sting. Take with food. 10 Tab 1    ALPRAZolam (XANAX) 1 mg tablet TAKE 1 & 1/2 TABLETS BY MOUTH TWICE DAILY AS NEEDED FOR ANXIETY OR SLEEP. MAX DAILY AMOUNT IS 3MG 270 Tab 0    FLUoxetine (PROZAC) 40 mg capsule Take 1 Cap by mouth daily. 90 Cap 1    ondansetron (ZOFRAN ODT) 8 mg disintegrating tablet DISSOLVE 1 TABLET IN MOUTH EVERY 8 HOURS AS NEEDED FOR NAUSEA 20 Tab 5    losartan (COZAAR) 50 mg tablet Take 1 Tab by mouth daily.  bumetanide (BUMEX) 0.5 mg tablet Take 0.5 mg by mouth daily. PRN FOR SWELLING      naloxone (NARCAN) 4 mg/actuation nasal spray Use 1 spray intranasally, then discard. Repeat with new spray every 2 min as needed for opioid overdose symptoms, alternating nostrils. 2 Each 0    promethazine (PHENERGAN) 25 mg tablet Take 1 Tab by mouth every eight (8) hours as needed for Nausea. 120 Tab 1    gabapentin (NEURONTIN) 400 mg capsule Take 2 Caps by mouth three (3) times daily. 540 Cap 1    meloxicam (MOBIC) 7.5 mg tablet Take 1 Tab by mouth two (2) times a day.  DULoxetine (CYMBALTA) 60 mg capsule TAKE ONE CAPSULE BY MOUTH TWICE DAILY (Patient taking differently: Take 60 mg by mouth daily. ) 180 Cap 1    montelukast (SINGULAIR) 10 mg tablet Take 1 Tab by mouth daily. 90 Tab 1    SSD 1 % topical cream APPLY TO AFFECTED AREA DAILY 50 g 3    NYSTOP powder APPLY  POWDER TOPICALLY 4 TIMES DAILY TO AFFECTED AREA FOR 30 DAYS 60 g 5    ibuprofen (MOTRIN) 600 mg tablet TAKE 1 TABLET BY MOUTH EVERY 6 HOURS AS NEEDED FOR PAIN 90 Tab 1    nystatin (MYCOSTATIN) topical cream Apply  to affected area two (2) times daily as needed for Skin Irritation or Itching. 30 g 5    diphenhydrAMINE (BENADRYL) 25 mg capsule Take 50 mg by mouth nightly.  Calcium Carbonate-Vit D3-Min 600 mg calcium- 400 unit tab Take 1,200 Tabs by mouth Daily (before breakfast).  OTHER Take 2 Tabs by mouth two (2) times a day. Medication call \"Leg Cramps\"  PTC taken 2x daily for severe leg cramps      prenatal 93/KYSM fum/folic/dha (PRENATAL-1 PO) Take 1 Tab by mouth Daily (before breakfast). Plan:  Obtain authorization for testing from insurance company. Report to follow once testing, scoring, and interpretation completed. ? Organic based neurocognitive issues versus mood disorder or combination of same. ? Problems organic, functional, or both? This note will not be viewable in [address]

## 2020-05-28 ENCOUNTER — TELEPHONE (OUTPATIENT)
Dept: FAMILY MEDICINE CLINIC | Age: 60
End: 2020-05-28

## 2020-05-28 DIAGNOSIS — M54.50 CHRONIC MIDLINE LOW BACK PAIN WITHOUT SCIATICA: ICD-10-CM

## 2020-05-28 DIAGNOSIS — G89.29 CHRONIC MIDLINE LOW BACK PAIN WITHOUT SCIATICA: ICD-10-CM

## 2020-05-28 NOTE — TELEPHONE ENCOUNTER
Pharmacy is asking for HYDROXYZINE 25mg and IBUPROFEN 800mg     Did not see on reorder list and order in doctors box

## 2020-06-09 ENCOUNTER — OFFICE VISIT (OUTPATIENT)
Dept: NEUROLOGY | Age: 60
End: 2020-06-09

## 2020-06-09 ENCOUNTER — TELEPHONE (OUTPATIENT)
Dept: NEUROLOGY | Age: 60
End: 2020-06-09

## 2020-06-09 VITALS — TEMPERATURE: 98.1 F

## 2020-06-09 DIAGNOSIS — F41.9 SEVERE ANXIETY: ICD-10-CM

## 2020-06-09 DIAGNOSIS — R41.840 ATTENTION DEFICIT: ICD-10-CM

## 2020-06-09 DIAGNOSIS — G31.84 MILD COGNITIVE IMPAIRMENT: Primary | ICD-10-CM

## 2020-06-09 DIAGNOSIS — F32.2 SEVERE MAJOR DEPRESSION (HCC): ICD-10-CM

## 2020-06-09 DIAGNOSIS — F43.10 PTSD (POST-TRAUMATIC STRESS DISORDER): ICD-10-CM

## 2020-06-09 NOTE — LETTER
6/10/20 Patient: Sandra Gomez YOB: 1960 Date of Visit: 6/9/2020 Ольга Albarado MD 
[address] [facility] [address] [facility] [phone] VIA In Basket Dear Ольга Albarado MD, Thank you for referring Ms. Twylla Nissen to [facility] for evaluation. My notes for this consultation are attached. If you have questions, please do not hesitate to call me. I look forward to following your patient along with you. Sincerely, Cliff Luna PsyD

## 2020-06-10 NOTE — PROGRESS NOTES
[address] [facility],[address]Th [address]  [address]   [place] [address] [facility]    [phone] Office   [phone] Fax      Neuropsychological Evaluation Report      Referral:  Linda Dash MD    Mo Dos Santos is a 61 y.o. right handed  female who was unaccompanied to the initial clinical interview on 5/13/20. Please refer to her medical records for details pertaining to her history. At the start of the appointment, I reviewed the patient's [facility] Epic Chart (including Media scanned in from previous providers) for the active Problem List, all pertinent Past Medical Hx, medications, recent radiologic and laboratory findings. In addition, I reviewed pt's documented Immunization Record and Encounter History. When I saw her last:    Mo Dos Santos is a 64 y.o. right handed   female who was unaccompanied to the initial clinical interview on 8/4/17. Please refer to her medical records for details pertaining to her history. [facility] was down when I saw her so I am copying my notes here: Ms. Augustus Spring is a right handed   female who was unaccompanied to the initial neuropych consult visit. She completed almost two years of college without history of previously diagnosed LD and/or receipt of special education services. She is not currently working. In 2009, she lost her mom, stepfather, father, and grandparents all passed away. Her world ended. Her grandparents were everything in her life. She has been on disability services initially for chronic pain. The last thing she remembers is when her grandmother passed away in her arms. She last remembers them putting her body in taking her away. She literally has no memory of any events between [id number]. 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 She has no memory of any of this.   No memory of essentially any event until 2015. Feels like she woke up around then. Her son says that the whole time he was waiting for that [facility] to come back. Since she has come off of a lot of medication she was taking. Currently, she still is not back to baseline and has progressive short term memory problems. She asks the same question over and over. Starts tasks and does not complete. Spouse said to tell me that she asked him the same question over and over on the way to [place] and back. She does not recall that question today. She drives but only within a limited radius. Has gotten lost multiple times, and got lost while driving here today. She does not need reminders for medications. She is not allowed to touch the checkbook, because they were overdrawn and things were getting shut off when she was in charge of it. Spouse writes everything for her on a calendar on the fridge but she forgets to look at the calendar. He keeps writing lists and things for her and even then she forgets. She is losing words in conversation. She had a hard attack in 1994 and a stroke in 2015. From 2015 to now, feels like memory issues are progressively getting worse.       She does not sleep unless she takes her Xanax. She puts stuff on the stove and forgets that it is there. She manages to make a lot of meals. She has a discord with the vacuum as she only does half the house and stops and doesnt know why. Moodwise, these days, she is on antidepressant and another medication was recently added, but doesnt know what that was. Mood is good now, with that extra medication. Before, she was crying all the time. Memory not better in the past two weeks. She did try to kill herself September 8th of last year. She ODd on every pill she had. Spouse found her. Spouse and daughter called 911 and woke up tied to bed with tube down her throat in ICU. She has not attempted before.   At the time, spouse and patient were . Boyfriend backed out of something and she left him there. She was upset that he never called. Son and DIL have not spoken to her in two years because of her emotional problems. Lots going on. She has been seeing a Psychiatrist since 2014. Saw Dr. Josephine Jimenes and prior to that was seeing [facility]. She is now getting her meds from PCP. No talk therapy right now. Has seen three counselors in the past.  She wants to know how to fix this.       Appetite is fine. No changes in sense of smell. Cant taste anything. Balance is terrible, and she has falls often. She has bad knees. She did lose 80 pounds so far.       Does not drink alcohol, does not smoke. No history of psychosis. Once her grandmother passed away she was hearing music and its gone and doesnt hear it anymore. Her father had AD and so did her grandmother. No current legal troubles. She crochets and now forgets patterns and the movements associated with crocheting. Keeps losing count. She has gone through menopause and has no libido. She is scheduled to see OB soon regarding this.          Dx then included: This is a predominant normal range Neuropsychological Evaluation with respect to neurocognitive functioning. In this regard, the only impairment noted was for sustained visual attention. Otherwise, her mental status, verbal fluency, confrontation naming, working memory, processing speed, perceptual reasoning, verbal comprehension, executive functioning, and bilateral fine motor dexterity are normal.  From an emotional standpoint, there is severe PTSD with severe depression and anxiety. Her anxiety is significant to the degree whereby her ability to attend and to concentrate are compromised.                   In my opinion, this profile is inconsistent with dementia. It is also inconsistent with residua from CVA. Aside from a chronic ADHD- Inattentive type concern, this is a normal range Neurocognitive profile. Instead, the day-to-day problems and decline are secondary to profound emotional distress secondary to PTSD. I suggest intensive psychiatric treatment to include long term psychiatric medication management for anxiety and depression along with active participation in intensive psychotherapy. Her risk for self-harm remains elevated and needs to be monitored by her mental health care providers. I hope she is reassured that this is not a dementing condition. Once mood improves, if attention continues to impact her functioning, then I would recommend consideration for an appropriate medication for attention if this is not medically contraindicated. I am not concerned about competency, day-to-day supervision, etc.  Baseline now established. Follow up prn. Clinical correlation is, of course, indicated. I will discuss these findings with the patient when she follows up with me in the near future. A follow up Neuropsychological Evaluation is indicated on a prn basis, especially if there are any cognitive and/or emotional changes.       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  She forgets the content of conversations. Did speak with PCP about this. She has chronic pain in the back. She is seeking SCS placement/clearance. Understands nuances of the procedure and explained it well to me. Has reasonable expectations. She was on Adderall, which was not helping, and starting yesterday her dose has double.   She remains independent for medications, finances, day-to-day chores, etc.  Spouse has to remind her of things. Welbutrin has been added to her Prozac. She is on neurontin as well. She is on all the same medicines as before. She has worsening memory. She saw Dr. Mary Ragland, and has been seeing him for a number of years now. He was discharged from practice after she and the PA had a disagreement. Pain is miserable. Lymphedema out of control, since at least June of last year. She pumps every day. Wears her leggings. No counseling or psychiatrist currently. She falls often. She is on high dosages of many of these meds. Sees Dr. Cristhian Cason also. I discussed with her sister as well. Sister has seen increased memory problems, word finding difficulties, and general day-to-day stress including those related to chronic pain and the patient and her spouse seem to have an argumentative relationship or he, according to the sister, is dismissive towards the patient. Progressive decline noted by sister and concerning and wondering how much is organic versus mood. Neuropsychological Mental Status Exam (NMSE):      Historian: Good  Praxis: No UE apraxia  R/L Orientation: Intact to self and to other  Dress: within normal limits   Weight: Overweight   Appearance/Hygiene: within normal limits   Gait: within normal limits   Assistive Devices: glasses  Mood: within normal limits   Affect: within normal limits   Comprehension: within normal limits   Thought Process: within normal limits   Expressive Language: within normal limits   Receptive Language: within normal limits   Motor:  No cognitive or motor perseveration  ETOH: Denied  Tobacco: Denied  Illicit: Denied  SI/HI: Denied  Psychosis: Denied  Insight: Within normal limits  Judgment: Within normal limits  Other Psych:      Past Medical History:   Diagnosis Date    ADD (attention deficit disorder)     Anemia     Anxiety     Autoimmune disease (HCC)     fibromyalgia    Burn     Shoulder, back and buttocks  from use of heating pad.     CAD (coronary artery disease)     MI in     Chronic pain     Followed by Dr. Valencia Goetz Chronic pain     Fibromyalgia    Dysthymic disorder 2012    PTSD, memory loss short term /  dementia depression    Fibromyalgia     GERD (gastroesophageal reflux disease)     rarely    Herniated cervical disc     Ill-defined condition     lymphedema in legs    Ill-defined condition     dementia    Kidney stone     hx frequently    Lumbago     Lymphedema     uses compression stockings in daytime, flexitouch pump at night    Lymphedema     Memory loss 2011    Memory loss, short term     MVP (mitral valve prolapse)     Nausea & vomiting     after general anesthesia    PCOS (polycystic ovarian syndrome)     PTSD (post-traumatic stress disorder)     Pulmonary embolism ([address] X2    Associated with surgery X2    Scoliosis     Sinus infection     saw PCP 3/15/19  started treatment 3/18/18    Stroke [facility])     2014    Sun-damaged skin     Sunburn, blistering     Tanning bed exposure     TIA on medication     TIA L hemiparesis - Was on diet pills     TMJ (dislocation of temporomandibular joint)     Vitamin B 12 deficiency     resolved    Vitamin D deficiency        Past Surgical History:   Procedure Laterality Date    CARDIAC SURG PROCEDURE UNLIST      catheterization - heart attack, mitral valve prolapse    COLONOSCOPY N/A 3/15/2019    COLONOSCOPY-no info to  performed by [facility] S[facility]chez MD at [facility] HX BREAST AUGMENTATION Bilateral     [address]    , PCOS    HX DILATION AND CURETTAGE  2019    H/S, D+C with myosure--normal path with endometrial polyp    HX GASTRIC BYPASS  1999    NH: open gastric bypass, naseem,  umb hernia repair, tr. vagotomy    HX ORTHOPAEDIC  [id number],    bunion surgery, heel spur surgery    HX TONSILLECTOMY      As a child        Allergies   Allergen Reactions    Bee Sting [Sting, Bee] Anaphylaxis    Morphine Other (comments)     Severe stomach cramps     Vicodin [Hydrocodone-Acetaminophen] Hives       Family History   Problem Relation Age of Onset    Hypertension Mother     Ovarian Cancer Mother         Spread to Throat and Lung     Cancer Mother 61        unknown type    Diabetes Father     Stroke Father     Hypertension Father     Breast Cancer Sister     Breast Cancer Maternal Grandmother         Passed away from COPD - Former smoker     COPD Maternal Grandmother     Cancer Maternal Grandmother 79        colon       Social History     Tobacco Use    Smoking status: Never Smoker    Smokeless tobacco: Never Used    Tobacco comment: Never used vapor or e-cigs    Substance Use Topics    Alcohol use: No     Alcohol/week: 0.0 standard drinks    Drug use: No       Current Outpatient Medications   Medication Sig Dispense Refill    tapentadoL (Nucynta) 50 mg tablet Nucynta 50 mg tablet  Take 1 tablet 3 times a day by oral route as needed      EPINEPHrine (EPIPEN) 0.3 mg/0.3 mL injection 1 Syringe by SubCUTAneous route once. 1 Syringe by SubCUTAneous route once for anaphalaxis for bee stings      cyclobenzaprine (FLEXERIL) 10 mg tablet Take 10 mg by mouth two (2) times daily as needed.  lidocaine (Lidoderm) 5 % Lidoderm 5 % topical patch  APPLY 1 PATCH TOPICALLY ONCE DAILY. MAY WEAR UP TO 12 HOURS      buPROPion SR (WELLBUTRIN SR) 150 mg SR tablet Take 1 tablet by mouth daily for 3 days, then increase to 1 tablet twice daily. 180 Tab 1    dextroamphetamine-amphetamine (ADDERALL) 30 mg tablet Take 1 Tab by mouth two (2) times a day for 30 days. Max Daily Amount: 2 Tabs. 60 Tab 0    predniSONE (DELTASONE) 20 mg tablet Take 20 mg by mouth two (2) times a day. For 5 days for bee sting. Take with food. 10 Tab 1    ALPRAZolam (XANAX) 1 mg tablet TAKE 1 & 1/2 TABLETS BY MOUTH TWICE DAILY AS NEEDED FOR ANXIETY OR SLEEP.  MAX DAILY AMOUNT IS 3MG 270 Tab 0    FLUoxetine (PROZAC) 40 mg capsule Take 1 Cap by mouth daily. 90 Cap 1    ondansetron (ZOFRAN ODT) 8 mg disintegrating tablet DISSOLVE 1 TABLET IN MOUTH EVERY 8 HOURS AS NEEDED FOR NAUSEA 20 Tab 5    losartan (COZAAR) 50 mg tablet Take 1 Tab by mouth daily.  bumetanide (BUMEX) 0.5 mg tablet Take 0.5 mg by mouth daily. PRN FOR SWELLING      naloxone (NARCAN) 4 mg/actuation nasal spray Use 1 spray intranasally, then discard. Repeat with new spray every 2 min as needed for opioid overdose symptoms, alternating nostrils. 2 Each 0    promethazine (PHENERGAN) 25 mg tablet Take 1 Tab by mouth every eight (8) hours as needed for Nausea. 120 Tab 1    gabapentin (NEURONTIN) 400 mg capsule Take 2 Caps by mouth three (3) times daily. 540 Cap 1    meloxicam (MOBIC) 7.5 mg tablet Take 1 Tab by mouth two (2) times a day.  DULoxetine (CYMBALTA) 60 mg capsule TAKE ONE CAPSULE BY MOUTH TWICE DAILY (Patient taking differently: Take 60 mg by mouth daily. ) 180 Cap 1    montelukast (SINGULAIR) 10 mg tablet Take 1 Tab by mouth daily. 90 Tab 1    SSD 1 % topical cream APPLY TO AFFECTED AREA DAILY 50 g 3    NYSTOP powder APPLY  POWDER TOPICALLY 4 TIMES DAILY TO AFFECTED AREA FOR 30 DAYS 60 g 5    ibuprofen (MOTRIN) 600 mg tablet TAKE 1 TABLET BY MOUTH EVERY 6 HOURS AS NEEDED FOR PAIN 90 Tab 1    nystatin (MYCOSTATIN) topical cream Apply  to affected area two (2) times daily as needed for Skin Irritation or Itching. 30 g 5    diphenhydrAMINE (BENADRYL) 25 mg capsule Take 50 mg by mouth nightly.  Calcium Carbonate-Vit D3-Min 600 mg calcium- 400 unit tab Take 1,200 Tabs by mouth Daily (before breakfast).  OTHER Take 2 Tabs by mouth two (2) times a day. Medication call \"Leg Cramps\"  PTC taken 2x daily for severe leg cramps      prenatal 81/ZYRX fum/folic/dha (PRENATAL-1 PO) Take 1 Tab by mouth Daily (before breakfast). Plan:  Obtain authorization for testing from insurance company.   Report to follow once testing, scoring, and interpretation completed. ? Organic based neurocognitive issues versus mood disorder or combination of same. ? Problems organic, functional, or both? This note will not be viewable in [address] Neuropsychological Test Results  Patient Testing 6/9/20 Report Completed 6/10/20  A Psychometrist Assisted w/ portions of this evaluation while under my direct  supervision    The following evaluation procedures/tests were administered:      Neuropsychologist Performed, Interpreted, & Reported:  Neuropsychological Mental Status Exam, Revised Memory & Behavior Checklist,  Mini Mental Status Exam, Clock Drawing Test, Anali-Melzack Pain Questionnaire, Test Of Premorbid Functioning, History Taking  & Clinical Interview With The Patient, Additional History Taking w/ the Patient's Sister, EVELINA, CPT-III, Review Of Available Records. Psychometrist Administered under Neuropsychologist Supervision & Neuropsychologist Interpreted & Neuropsychologist Reported:  Verbal Fluency Tests, Mitchell & Mitchell  Revised, Trailmaking Test Parts A & B, Wechsler Adult Intelligence Scale - IV, [employer]  3, Grooved Pegboard, Barrera Depression Inventory  II, Barrera Anxiety Inventory. Test Findings:  Test Findings:  Note:  The patients raw data have been compared with currently available norms which include demographic corrections for age, gender, and/or education. Sometimes, the patients scores are compared to demographically similar individuals as close to the patients age, education level, etc., as possible. \"Average\" is viewed as being +/- 1 standard deviation (SD) from the stated mean for a particular test score. \"Low average\" is viewed as being between 1 and 2 SD below the mean, and above average is viewed as being 1 and 2 SD above the mean.   Scores falling in the borderline range (between 1-1/2 and 2 SD below the mean) are viewed with particular attention as to whether they are normal or abnormal neurocognitive test scores. Other methods of inference in analyzing the test data are also utilized, including the pattern and range of scores in the profile, bilateral motor functions, and the presence, if any, of pathognomonic signs. Behaviorally, the patient was friendly and cooperative and appeared motivated to perform well during this examination. Within this context, the results of this evaluation are viewed as a valid reflection of the patients actual neurocognitive and emotional status. The patient's score of 25/30 on the Mini-Mental Status Exam was impaired. In this regard, she was not oriented to date. Serial 7s were 3/5 correct. Recall for three words after a brief delay was 2/3 correct. Visual construction was impaired. Clock drawing was normal.        Her structured word list fluency, as assessed by the FAS Test, was within the below average range with a T score of 41. Category fluency was within the mildly impaired range with a T score of 36. Confrontation naming ability, as assessed by the [facility]  Revised, was within the mildly impaired range at 50/60 correct (T = 37). This pattern of performance is indicative of a patient who is at increased risk for day-to-day problems with verbal fluency or confrontation naming. The patient was administered the [facility] Continuous Performance Test  III and review of the subscales within this instrument revealed mild to moderate concerns for inattentiveness without iimpulsivity. This pattern of performance is indicative of a patient who is at increased risk for day-to-day problems with sustained visual attention/concentration. The patient is not showing problems with working memory capacity (13th %ile) or processing speed (10th %ile) on the WAIS-IV. Her Verbal Comprehension Index score of 91 was within the average range. Her Perceptual Reasoning Index score of 75 was borderline.   The latter score reflects a decline in functioning based on an assessment of premorbid functioning. The patient was administered the [place] Verbal Learning Test  - 3 and generated a normal range and positive learning curve over five repeated auditory word list learning trials. An interference trial was normal.  Free and cued, short and long delayed recall were all within the normal range. This pattern of performance is not indicative of a patient who is at increased risk for day-to-day problems with auditory learning and/or memory. Simple timed visual motor sequencing (Trailmaking Test Part A) was within the average range with a T score of 45. Her performance on a similar, but more complex task of timed visual motor sequencing (Trailmaking Test Part B) was within the mildly to moderately impaired range with a T score of 33. She made only one sequencing error on this latter completed test.  This pattern of performance is not indicative of a patient who is at increased risk for day-to-day problems with executive functioning. Fine motor dexterity was within the mildly to moderately impaired range bilaterally. This does not raise concern for a particularly lateralized brain dysfunction. The patient rated her current level of pain as \"3/5- Distressing\" on the Anali-Melzack Pain Questionnaire. She reported pain in her shoulders, back, elbows, hips, hands, buttocks, knees, right leg, and right ankle. Her Barrera Depression Inventory II score of 39 was within the severely depressed range. Her Barrera Anxiety Inventory score of 27 reflected severe anxiety. The patient's responses on the Personality Assessment Inventory were deemed valid for interpretation though she does report some bizarre and dramatic symptoms. Within this context, marked anxiety, depression, and PTSD issues are present.  She is socially isolated, with particular difficulties interpreting the normal nuances of interpersonal behavior that provide meaning to relationships. She is uncertain about major life issues. Self-concept is harsh and negative. Notable day-to-day stress and turmoil is reported. Interventions directed at problematic relationships may be of some use in alleviating a major source of stress for her. She reports recurrent thoughts of suicide on this measure and denied active plan or intent with me. Despite her clear recognition of a number of areas of difficulties in her life, her responses suggest resistance to the idea that personal changes are needed. The nature of some of these problems suggests that treatment will be difficult, with a lengthy process and a higher probability of reversals. Impressions & Recommendations: This is the patient's second evaluation of neurocognitive and psychologic status. On first exam, mild problems with attention were noted and her neurocognitive profile was otherwise normal.  She had reported severe levels of depression, anxiety, and PTSD. On current examination, there is a decline over time in verbal fluency, attention, bilateral motor dexterity, and perceptual reasoning. Other neurocognitive domain performances remain normal. She continues to report severe anxiety, severe depression, and PTSD. This is a patient with unremitting depression, anxiety, and PTSD who is showing Mild Cognitive Impairment as there is a definite trend of neurocognitive decline over time. This is not (yet) a dementia type issue. However, I have serious concerns that she is declining as well as concerns that she is not psychiatrically improving. I suggest a review of her current medication management for marked depression and anxiety along with consideration for treatment for attention problems if not medically contraindicated. Active engagement in psychotherapy is strongly advised, and monitor closely for any escalation in currently passive suicidal thinking.   She certainly maintains the neurocognitive wherewithal to understand and have capacity for consent for spinal cord stimulator implantation. My concern, though, is the extent of her anxiety and depression is severe and this has a linear relationship with her experience of pain. Unless mood improves, I doubt much can be done to alleviate significantly her pain, and yet these are cyclical problems. Couple's counseling is also advised, strongly so. I find her currently competent to make medical decisions, financial decisions, to vote, to , to own a firearm. No concerned about driving. Reminders are needed for medications and finances/bill pay. Stay active mentally, physically, and socially. We now have baseline and updated data on her. Would like to see her yearly, or prn. Clinical correlation is advised. I will discuss these findings with the patient when she follows up with me in the near future. A follow up Neuropsychological Evaluation is indicated on a prn basis, especially if there are any cognitive and/or emotional changes. DIAGNOSES:  Mild Cognitive Impairment     Severe Major Depression     Severe Anxiety     PTSD   The above information is based upon information currently available to me. If there is any additional information of which I am currently unaware, I would be more than happy to review it upon having it made available to me. Thank you for the opportunity to see this interesting individual.     Sincerely,       Jonathan Stout. Marjorie Araujo, EdS    CC:  Johnny White MD    Time Documentation:    83939 x 1: Neurobehavioral Status Exam/Clinical Interview: (1 hour, (already billed on first date of service)    07255 x 1: Neurobehavioral Status Exam, Additional: (35 additional minutes, see above)     96138 x 1  96139 x 5 Test Administration/Data Gathering By Technician: (3 hours).  97653 x 1 (first 30 minutes), 62302 x 5 (each additional 30 minutes)    96132 x 1  96133 x 1 Testing Evaluation Services by Neuropsychologist (1 hour, 48 minutes) 96132 x 1 (first hour), 96133 x 1 (50 minutes)    Definitions:      25803/34364:  Neurobehavioral Status Exam, Clinical interview. Clinical assessment of thinking, reasoning and judgment, by neuropsychologist, both face to face time with patient and time interpreting those test results and reporting, first and subsequent hours)    22249/24299: Neuropsychological Test Administration by Technician/Psychometrist, first 30 minutes and each additional 30 minutes. The above includes: Record review. Review of history provided by patient. Review of collaborative information. Testing by Clinician. Review of raw data. Scoring. Report writing of individual tests administered by Clinician. Integration of individual tests administered by psychometrist with NSE/testing by clinician, review of records/history/collaborative information, case Conceptualization, treatment planning, clinical decision making, report writing, coordination Of Care. Psychometry test codes as time spent by psychometrist administering and scoring neurocognitive/psychological tests under supervision of neuropsychologist.  Integral services including scoring of raw data, data interpretation, case conceptualization, report writing etcetera were initiated after the patient finished testing/raw data collected and was completed on the date the report was signed.

## 2020-06-23 ENCOUNTER — TELEPHONE (OUTPATIENT)
Dept: FAMILY MEDICINE CLINIC | Age: 60
End: 2020-06-23

## 2020-06-23 NOTE — TELEPHONE ENCOUNTER
----- Message from Cameron Taylor sent at 6/23/2020  2:19 PM EDT -----  Regarding: Dr. Shanna Princer: [phone]  Caller's first and last name: N/A  Reason for call: Pt did not disclose the reasoning for the call as she stated it would get lost in translation. Callback required yes/no and why: Yes  Best contact number(s): [phone] after 4:00pm  Details to clarify the request: Pt requesting to speak directly with Dr. Brianna Calvin.

## 2020-06-24 DIAGNOSIS — F41.9 ANXIETY: ICD-10-CM

## 2020-06-24 NOTE — TELEPHONE ENCOUNTER
Pt called. Reports being accepted into a clinical trial at [facility] for lymphedema. Requesting a letter concerning her lymphedema, previous treatments, and statement about her pain and concern for falls due to this condition. Will call back to number that letter can be sent to.

## 2020-07-01 RX ORDER — AMITRIPTYLINE HYDROCHLORIDE 50 MG/1
TABLET, FILM COATED ORAL
Qty: 90 TAB | Refills: 1 | Status: SHIPPED | OUTPATIENT
Start: 2020-07-01 | End: 2020-08-08

## 2020-07-09 ENCOUNTER — OFFICE VISIT (OUTPATIENT)
Dept: NEUROLOGY | Age: 60
End: 2020-07-09

## 2020-07-09 DIAGNOSIS — R41.840 ATTENTION DEFICIT: ICD-10-CM

## 2020-07-09 DIAGNOSIS — F43.10 PTSD (POST-TRAUMATIC STRESS DISORDER): ICD-10-CM

## 2020-07-09 DIAGNOSIS — R41.840 INATTENTION: ICD-10-CM

## 2020-07-09 DIAGNOSIS — F41.9 SEVERE ANXIETY: ICD-10-CM

## 2020-07-09 DIAGNOSIS — F32.2 SEVERE MAJOR DEPRESSION (HCC): ICD-10-CM

## 2020-07-09 DIAGNOSIS — R47.89 WORD FINDING DIFFICULTY: ICD-10-CM

## 2020-07-09 DIAGNOSIS — G31.84 MILD COGNITIVE IMPAIRMENT: Primary | ICD-10-CM

## 2020-07-09 DIAGNOSIS — R41.3 MEMORY LOSS: ICD-10-CM

## 2020-07-09 NOTE — PROGRESS NOTES
In person in office visit. Prior to seeing the patient I reviewed the records, including the previously completed report, the records in [place], and any updated visits from other providers since I saw the patient last.      Today, I engaged in a psychoeducational and supportive psychotherapy and feedback session with the patient and sister in the office. I reviewed the results of the recent Neuropsychological Evaluation, including discussing individual tests as well as patient's areas of neurocognitive strength versus weakness. We discussed, in detail, the following: This is the patient's second evaluation of neurocognitive and psychologic status. On first exam, mild problems with attention were noted and her neurocognitive profile was otherwise normal.  She had reported severe levels of depression, anxiety, and PTSD. On current examination, there is a decline over time in verbal fluency, attention, bilateral motor dexterity, and perceptual reasoning. Other neurocognitive domain performances remain normal. She continues to report severe anxiety, severe depression, and PTSD.                  This is a patient with unremitting depression, anxiety, and PTSD who is showing Mild Cognitive Impairment as there is a definite trend of neurocognitive decline over time. This is not (yet) a dementia type issue. However, I have serious concerns that she is declining as well as concerns that she is not psychiatrically improving. I suggest a review of her current medication management for marked depression and anxiety along with consideration for treatment for attention problems if not medically contraindicated. Active engagement in psychotherapy is strongly advised, and monitor closely for any escalation in currently passive suicidal thinking. She certainly maintains the neurocognitive wherewithal to understand and have capacity for consent for spinal cord stimulator implantation.   My concern, though, is the extent of her anxiety and depression is severe and this has a linear relationship with her experience of pain. Unless mood improves, I doubt much can be done to alleviate significantly her pain, and yet these are cyclical problems. Couple's counseling is also advised, strongly so. I find her currently competent to make medical decisions, financial decisions, to vote, to , to own a firearm. No concerned about driving. Reminders are needed for medications and finances/bill pay. Stay active mentally, physically, and socially. We now have baseline and updated data on her. Would like to see her yearly, or prn. Clinical correlation is advised. I will discuss these findings with the patient when she follows up with me in the near future. A follow up Neuropsychological Evaluation is indicated on a prn basis, especially if there are any cognitive and/or emotional changes.       DIAGNOSES:            Mild Cognitive Impairment                                     Severe Depression     Severe Anxiety     PTSD     Education was provided regarding my diagnostic impressions, and we discussed treatment plan/options. I also answered numerous questions related to the clinical findings, including discussing various methods to improve cognition and mood. Counseling provided regarding mood and cognition. CBT and supportive psychotherapy techniques were utilized. Supportive/Cognitive Behavioral/Solution Focused psychotherapy provided  Discussed rational versus irrational thinking patterns and their consequences. Discussed healthy/adaptive and unhealthy/maladaptive coping. The patient needs to follow with PCP, psychiatry, psychology. Will need to monitor her memory very closely and keep an eye by testing ast least once a year. She can't get out of pain. [address]adez for SCS from a psych standpoint. Marriage could use work, especially with respect to communication.        Specific areas which were addressed include: Cognitive decline over time but intact memory, mood issues unremitting, pain issues unremitting, psychological toll of all of this    The patient had the following concerns which I deferred to their referring provider: meds      Time spent today: 45

## 2020-07-16 ENCOUNTER — TELEPHONE (OUTPATIENT)
Dept: FAMILY MEDICINE CLINIC | Age: 60
End: 2020-07-16

## 2020-07-16 DIAGNOSIS — G89.29 CHRONIC MIDLINE LOW BACK PAIN WITHOUT SCIATICA: ICD-10-CM

## 2020-07-16 DIAGNOSIS — M54.50 CHRONIC MIDLINE LOW BACK PAIN WITHOUT SCIATICA: ICD-10-CM

## 2020-07-16 RX ORDER — OMEPRAZOLE 40 MG/1
40 CAPSULE, DELAYED RELEASE ORAL
Qty: 90 CAP | Refills: 0 | Status: SHIPPED | OUTPATIENT
Start: 2020-07-16

## 2020-07-16 RX ORDER — IBUPROFEN 600 MG/1
TABLET ORAL
Qty: 90 TAB | Refills: 1 | Status: SHIPPED | OUTPATIENT
Start: 2020-07-16

## 2020-07-16 NOTE — TELEPHONE ENCOUNTER
Orders Placed This Encounter    omeprazole (PRILOSEC) 40 mg capsule     Sig: Take 1 Cap by mouth Daily (before breakfast).      Dispense:  90 Cap     Refill:  0

## 2020-08-08 ENCOUNTER — HOSPITAL ENCOUNTER (EMERGENCY)
Age: 60
Discharge: HOME | End: 2020-08-08
Attending: EMERGENCY MEDICINE | Admitting: EMERGENCY MEDICINE
Payer: MEDICARE

## 2020-08-08 VITALS
BODY MASS INDEX: 36.65 KG/M2 | RESPIRATION RATE: 18 BRPM | WEIGHT: 220 LBS | SYSTOLIC BLOOD PRESSURE: 105 MMHG | HEART RATE: 76 BPM | HEIGHT: 65 IN | OXYGEN SATURATION: 100 % | DIASTOLIC BLOOD PRESSURE: 41 MMHG | TEMPERATURE: 98 F

## 2020-08-08 DIAGNOSIS — T63.441A BEE STING, ACCIDENTAL OR UNINTENTIONAL, INITIAL ENCOUNTER: ICD-10-CM

## 2020-08-08 DIAGNOSIS — T78.40XA ALLERGIC REACTION, INITIAL ENCOUNTER: Primary | ICD-10-CM

## 2020-08-08 PROCEDURE — 74011250636 HC RX REV CODE- 250/636: Performed by: EMERGENCY MEDICINE

## 2020-08-08 PROCEDURE — 96375 TX/PRO/DX INJ NEW DRUG ADDON: CPT

## 2020-08-08 PROCEDURE — 99284 EMERGENCY DEPT VISIT MOD MDM: CPT

## 2020-08-08 PROCEDURE — 96374 THER/PROPH/DIAG INJ IV PUSH: CPT

## 2020-08-08 RX ORDER — EPINEPHRINE 0.3 MG/.3ML
0.3 INJECTION SUBCUTANEOUS
Qty: 2 SYRINGE | Refills: 0 | Status: SHIPPED | OUTPATIENT
Start: 2020-08-08 | End: 2020-08-08

## 2020-08-08 RX ORDER — OXYCODONE HYDROCHLORIDE 10 MG/1
10 TABLET ORAL
COMMUNITY

## 2020-08-08 RX ORDER — PREDNISONE 50 MG/1
50 TABLET ORAL DAILY
Qty: 5 TAB | Refills: 0 | Status: SHIPPED | OUTPATIENT
Start: 2020-08-08 | End: 2020-08-13

## 2020-08-08 RX ADMIN — FAMOTIDINE 20 MG: 10 INJECTION INTRAVENOUS at 17:32

## 2020-08-08 RX ADMIN — SODIUM CHLORIDE 1000 ML: 900 INJECTION, SOLUTION INTRAVENOUS at 17:43

## 2020-08-08 RX ADMIN — METHYLPREDNISOLONE SODIUM SUCCINATE 125 MG: 125 INJECTION, POWDER, FOR SOLUTION INTRAMUSCULAR; INTRAVENOUS at 17:34

## 2020-08-08 NOTE — ED PROVIDER NOTES
Date of Service:  8/8/2020    Patient:  Leonardo Garcias    Chief Complaint:  Bee sting       HPI:  Leonardo Garcias is a 61 y.o.  female who presents for evaluation of multiple bee stings. Patient has anaphylactic reaction to bee stings. She was weed whacking that stung 3 separate times. She has shortness of breath and feeling like her throat was closing. 9 1 was called. Her  administered her EpiPen. On the way here she was provided with Benadryl and Zofran. Upon arrival here, patient states she only complains of some itching in her legs. Nausea has resolved and her other symptoms that she had including shortness of breath have also resolved. Otherwise she denies other acute complaints. Past Medical History:   Diagnosis Date    ADD (attention deficit disorder)     Anemia     Anxiety     Autoimmune disease ([facility] [address]     fibromyalgia    Burn     Shoulder, back and buttocks  from use of heating pad.     CAD (coronary artery disease)     MI in 1994    Chronic pain     Followed by Dr. Miguelina Oseguera Chronic pain     Fibromyalgia    Dementia ([facility] [address]     Dysthymic disorder 11/9/2012    PTSD, memory loss short term /  dementia depression    Fibromyalgia     GERD (gastroesophageal reflux disease)     rarely    Herniated cervical disc     Ill-defined condition     lymphedema in legs    Ill-defined condition     dementia    Kidney stone     hx frequently    Lumbago     Lymphedema     uses compression stockings in daytime, flexitouch pump at night    Lymphedema     Memory loss 1/17/2011    Memory loss, short term     MVP (mitral valve prolapse)     Nausea & vomiting     after general anesthesia    PCOS (polycystic ovarian syndrome)     PTSD (post-traumatic stress disorder)     Pulmonary embolism ([facility] [address] X2    Associated with surgery X2    Scoliosis     Sinus infection     saw PCP 3/15/19  started treatment 3/18/18    Stroke [facility])     april 2014    Sun-damaged skin     Sunburn, blistering  Tanning bed exposure     TIA on medication     TIA L hemiparesis - Was on diet pills     TMJ (dislocation of temporomandibular joint)     Vitamin B 12 deficiency     resolved    Vitamin D deficiency        Past Surgical History:   Procedure Laterality Date    CARDIAC SURG PROCEDURE UNLIST      catheterization - heart attack, mitral valve prolapse    COLONOSCOPY N/A 3/15/2019    COLONOSCOPY-no info to  performed by Regine Yeh MD at [address] [facility] HX BREAST AUGMENTATION Bilateral     [facility]    , PCOS    HX DILATION AND CURETTAGE  2019    H/S, D+C with myosure--normal path with endometrial polyp    HX GASTRIC BYPASS  1999    [facility]: open gastric bypass, naseem,  umb hernia repair, tr. vagotomy    HX ORTHOPAEDIC  [id number],    bunion surgery, heel spur surgery    HX TONSILLECTOMY      As a child          Family History:   Problem Relation Age of Onset    Hypertension Mother     Ovarian Cancer Mother         Spread to Throat and [address] Mother 61        unknown type    Diabetes Father     Stroke Father     Hypertension Father     Breast Cancer Sister     Breast Cancer Maternal Grandmother         Passed away from COPD - Former smoker     COPD Maternal Grandmother     Cancer Maternal Grandmother 79        colon       Social History     Socioeconomic History    Marital status:      Spouse name: Not on file    Number of children: Not on file    Years of education: Not on file    Highest education level: Not on file   Occupational History    Not on file   Social Needs    Financial resource strain: Not on file    Food insecurity     Worry: Not on file     Inability: Not on file    Transportation needs     Medical: Not on file     Non-medical: Not on file   Tobacco Use    Smoking status: Never Smoker    Smokeless tobacco: Never Used    Tobacco comment: Never used vapor or e-cigs    Substance and Sexual Activity    Alcohol use: No     Alcohol/week: 0.0 standard drinks    Drug use: No    Sexual activity: Not Currently     Partners: Male     Birth control/protection: None     Comment:    Lifestyle    Physical activity     Days per week: Not on file     Minutes per session: Not on file    Stress: Not on file   Relationships    Social connections     Talks on phone: Not on file     Gets together: Not on file     Attends [employer] service: Not on file     Active member of club or organization: Not on file     Attends meetings of clubs or organizations: Not on file     Relationship status: Not on file    Intimate partner violence     Fear of current or ex partner: Not on file     Emotionally abused: Not on file     Physically abused: Not on file     Forced sexual activity: Not on file   Other Topics Concern     Service No    Blood Transfusions Yes     Comment: unknown    Caffeine Concern No    Occupational Exposure No    Hobby Hazards No    Sleep Concern Yes    Stress Concern Yes    Weight Concern Yes    Special Diet No    Back Care No    Exercise No    Bike Helmet No    Seat Belt Yes    Self-Exams Yes   Social History Narrative    Not on file         ALLERGIES: Bee sting [sting, bee]; Morphine; and Vicodin [hydrocodone-acetaminophen]    Review of Systems   Constitutional: Negative for fever. HENT: Negative for hearing loss. Eyes: Negative for visual disturbance. Respiratory: Positive for shortness of breath. Cardiovascular: Negative for chest pain. Gastrointestinal: Positive for nausea. Negative for abdominal pain. Genitourinary: Negative for flank pain. Musculoskeletal: Negative for back pain. Skin: Positive for wound. Negative for rash. Neurological: Negative for dizziness and light-headedness. Psychiatric/Behavioral: Negative for confusion.        Vitals:    08/08/20 1717   BP: 114/57   Pulse: 83   Resp: 16   Temp: 98 °F (36.7 °C)   SpO2: 93%   Weight: 99.8 kg (220 lb)   Height: 5' 5\" (1.651 m)            Physical Exam  Vitals signs and nursing note reviewed. Constitutional:       General: She is not in acute distress. Appearance: Normal appearance. She is well-developed. She is not ill-appearing, toxic-appearing or diaphoretic. HENT:      Head: Normocephalic and atraumatic. Eyes:      General: No scleral icterus. Conjunctiva/sclera: Conjunctivae normal.   Neck:      Musculoskeletal: Neck supple. Vascular: No JVD. Trachea: No tracheal deviation. Cardiovascular:      Rate and Rhythm: Normal rate and regular rhythm. Heart sounds: Murmur present. No gallop. Pulmonary:      Effort: Pulmonary effort is normal. No respiratory distress. Breath sounds: Normal breath sounds. No stridor. No wheezing or rales. Chest:      Chest wall: No tenderness. Abdominal:      General: Bowel sounds are normal. There is no distension. Palpations: Abdomen is soft. Tenderness: There is no abdominal tenderness. Musculoskeletal: Normal range of motion. General: No tenderness or deformity. Skin:     General: Skin is warm and dry. Capillary Refill: Capillary refill takes less than 2 seconds. Findings: No rash. Comments: Three separate whelps from bee sting   Neurological:      Mental Status: She is alert and oriented to person, place, and time. Psychiatric:         Mood and Affect: Mood is anxious. Behavior: Behavior normal.          MDM  Number of Diagnoses or Management Options  Allergic reaction, initial encounter:   Bee sting, accidental or unintentional, initial encounter:         VITAL SIGNS:  Patient Vitals for the past 4 hrs:   Temp Pulse Resp BP SpO2   08/08/20 1730  83 19 105/44 92 %   08/08/20 1717 98 °F (36.7 °C) 83 16 114/57 93 %   08/08/20 1711  87 24 114/57 94 %         LABS:  No results found for this or any previous visit (from the past 6 hour(s)).      IMAGING:  No orders to display Medications During Visit:  Medications   methylPREDNISolone (PF) (Solu-MEDROL) injection 125 mg (125 mg IntraVENous Given 8/8/20 1734)   famotidine (PF) (PEPCID) 20 mg in 0.9% sodium chloride 10 mL injection (20 mg IntraVENous Given 8/8/20 1732)   sodium chloride 0.9 % bolus infusion 1,000 mL (1,000 mL IntraVENous New Bag 8/8/20 1743)         DECISION MAKING:  Justin Ronquillo is a 61 y.o. female who comes in as above. Patient appears well here. She feels better after treatment. Will DC home with f/o and return instructions. EPI pens sent to pharmacy. Patient stable and agreeable to the plan. IMPRESSION:  1. Allergic reaction, initial encounter    2. Bee sting, accidental or unintentional, initial encounter        DISPOSITION:  Discharged      Current Discharge Medication List      START taking these medications    Details   !! EPINEPHrine (EPIPEN) 0.3 mg/0.3 mL injection 0.3 mL by IntraMUSCular route once as needed for Anaphylaxis or Allergic Response for up to 1 dose. Qty: 2 Syringe, Refills: 0    Comments: Dispense 2 epi pen or epinephrine autoinjectors      predniSONE (DELTASONE) 50 mg tablet Take 1 Tab by mouth daily for 5 days. Qty: 5 Tab, Refills: 0       !! - Potential duplicate medications found. Please discuss with provider. CONTINUE these medications which have NOT CHANGED    Details   !! EPINEPHrine (EPIPEN) 0.3 mg/0.3 mL injection 1 Syringe by SubCUTAneous route once. 1 Syringe by SubCUTAneous route once for anaphalaxis for bee stings       ! ! - Potential duplicate medications found. Please discuss with provider.            Follow-up Information     Follow up With Specialties Details Why Contact Info    Melia Byrd MD Family Medicine Schedule an appointment as soon as possible for a visit   [address] [place] [address] [address]      [address] [facility] DEPT Emergency Medicine Go to  If symptoms worsen, As needed [facility] [address] [place] [address] Drive  [phone]            The patient is asked to follow-up with their primary care provider in the next several days. They are to call tomorrow for an appointment. The patient is asked to return promptly for any increased concerns or worsening of symptoms. They can return to this emergency department or any other emergency department.       Procedures

## 2020-08-08 NOTE — ED TRIAGE NOTES
Patient arrives to treatment area via EMS. Patient states allergy to bee stings; stung three times sometime around 1600 today. Patient's spouse attempted to give Epi one time and spouse \"put needle in and then took it right back out\"; administered second Epi pen appropriately after that. Patient was given 50mg benadryl IV and 4mg zofran IV by EMS. Patient has no shortness of breath, swelling, or hives. No complaints at this time except \"shakiness in my hands\" and general itching.

## 2020-08-08 NOTE — ED NOTES
Pt given discharge instructions by Dr Gean Kocher pt stable at time of discharge ambulates to lobby to meet family

## 2020-08-08 NOTE — ED NOTES
Pt given ice packs for stings to her left leg and a blanket with lights dimmed and stretcher repositioned for comfort.

## 2020-08-13 DIAGNOSIS — G89.29 CHRONIC MIDLINE LOW BACK PAIN WITHOUT SCIATICA: ICD-10-CM

## 2020-08-13 DIAGNOSIS — M54.50 CHRONIC MIDLINE LOW BACK PAIN WITHOUT SCIATICA: ICD-10-CM

## 2020-08-14 DIAGNOSIS — F33.2 SEVERE RECURRENT MAJOR DEPRESSION WITHOUT PSYCHOTIC FEATURES (HCC): ICD-10-CM

## 2020-08-14 RX ORDER — FLUOXETINE HYDROCHLORIDE 40 MG/1
CAPSULE ORAL
Qty: 90 CAP | Refills: 1 | Status: SHIPPED | OUTPATIENT
Start: 2020-08-14

## 2020-08-14 RX ORDER — GABAPENTIN 400 MG/1
CAPSULE ORAL
Qty: 540 CAP | OUTPATIENT
Start: 2020-08-14

## 2020-08-14 NOTE — TELEPHONE ENCOUNTER
reviewed. Gabapentin last filled on 11/12/19. Has recently been seen by Dr. Sangeeta Beckford with prescriptions for oxycodone 10 mg (6/13/2020, and 7/14/2020) and Adderall (6/10/2020). Will not fill gabapentin prescription.

## 2020-08-17 DIAGNOSIS — G89.29 CHRONIC MIDLINE LOW BACK PAIN WITHOUT SCIATICA: ICD-10-CM

## 2020-08-17 DIAGNOSIS — M54.50 CHRONIC MIDLINE LOW BACK PAIN WITHOUT SCIATICA: ICD-10-CM

## 2020-08-17 NOTE — TELEPHONE ENCOUNTER
----- Message from Claire Paxton sent at 8/14/2020  3:47 PM EDT -----  Regarding: Dr. Barbara Rene: [phone] (if not patient): n/a  Relationship of caller (if not patient): n/a  Best contact number(s):  [phone]  Name of medication and dosage if known: \"Gabatentone\" 400 mg  Is patient out of this medication (yes/no): yes  Pharmacy name: [facility] listed in chart? (yes/no): yes  Pharmacy phone number: [phone]  Date of last visit: 7/9/20  Details to clarify the request: 2nd request for refill

## 2020-08-18 RX ORDER — GABAPENTIN 400 MG/1
800 CAPSULE ORAL 3 TIMES DAILY
Qty: 540 CAP | Refills: 1 | OUTPATIENT
Start: 2020-08-18

## 2020-08-18 NOTE — TELEPHONE ENCOUNTER
Medication denied.  reviewed at initial medication request. Gabapentin last filled on 11/12/19. Has recently been seen by Dr. Akira Sales with prescriptions for oxycodone 10 mg (6/13/2020, and 7/14/2020) and Adderall (6/10/2020). If she is seeing Dr. Alysha Sparks for pain management, then he should also be managing her gabapentin.

## 2020-09-01 DIAGNOSIS — R11.0 NAUSEA: ICD-10-CM

## 2020-09-02 RX ORDER — PROMETHAZINE HYDROCHLORIDE 25 MG/1
TABLET ORAL
Qty: 120 TAB | Refills: 0 | Status: SHIPPED | OUTPATIENT
Start: 2020-09-02

## 2020-10-27 ENCOUNTER — TELEPHONE (OUTPATIENT)
Dept: PALLATIVE CARE | Age: 60
End: 2020-10-27

## 2020-10-27 NOTE — TELEPHONE ENCOUNTER
[facility] Palliative Medicine Office  Nursing Note  [phone]  Fax [phone]     Name:  Paul Todd  YOB: 1960     Incoming call from patient who states she was given the phone number for our office by Dr. Cassandra Rehman.  She states that she has stage 3 lymphedema in lower extremities (from bilat thighs down to feet). It is causing her pain and limiting her mobility. She reports that she has been to the lymphedema clinics at [address] [place] [address] and [facility] and has done what they have instructed her to do without improvement (exercises, uses pump for 6 hours every night, compression therapy)  Pt says she has been seen for evaluation at [facility] and she has been on a wait list at [facility] since Jan. 2020. Pt states she has fibromyalgia, lower back pain due to disc problems, and hip pain. She reports seeing multiple pain management physicians including Dr. Evelio Hernandez (she stopped going to him because he offered low dose Fentanyl patches which she says she did not want, she wanted opioid tablets) and Dr. Bon Farrar [facility])    Nurse explained outpatient Palliative Medicine services and the role of Palliative Medicine (\"extra layer of support\", symptom management, care decisions, improving quality of life, etc.)   Our specialty Palliative practice sees patients that are in the latter stages of a serious progressive illness, and in addition to limited life expectancy, the patient has the presence of 1 or more COPE needs (Care Decisions, Overwhelming Symptoms, Psychosocial Distress, or End-Stage Disease). Most of the pain we treat in our clinic is malignant pain. This nurse explained to pt that she does not fit the typical patient population that we see. This nurse will speak with our providers and call pt back to let her know if they have any suggestions. Pt voices understanding.         BO RobersonN, RN  Palliative Medicine  [phone]

## 2020-11-05 ENCOUNTER — TRANSCRIBE ORDER (OUTPATIENT)
Dept: SCHEDULING | Age: 60
End: 2020-11-05

## 2020-11-05 DIAGNOSIS — M79.7 FIBROMYALGIA: Primary | ICD-10-CM

## 2020-11-05 DIAGNOSIS — Z79.891 ENCOUNTER FOR LONG-TERM OPIATE ANALGESIC USE: ICD-10-CM

## 2020-11-05 DIAGNOSIS — M54.50 LOW BACK PAIN: ICD-10-CM

## 2020-12-09 ENCOUNTER — HOSPITAL ENCOUNTER (OUTPATIENT)
Dept: MRI IMAGING | Age: 60
Discharge: HOME | End: 2020-12-09
Attending: NURSE PRACTITIONER
Payer: MEDICARE

## 2020-12-09 DIAGNOSIS — M54.50 LOW BACK PAIN: ICD-10-CM

## 2020-12-09 DIAGNOSIS — Z79.891 ENCOUNTER FOR LONG-TERM OPIATE ANALGESIC USE: ICD-10-CM

## 2020-12-09 DIAGNOSIS — M79.7 FIBROMYALGIA: ICD-10-CM

## 2020-12-09 PROCEDURE — 72148 MRI LUMBAR SPINE W/O DYE: CPT

## 2021-08-30 ENCOUNTER — TELEPHONE (OUTPATIENT)
Dept: FAMILY MEDICINE CLINIC | Age: 61
End: 2021-08-30

## 2021-08-30 NOTE — TELEPHONE ENCOUNTER
L/M that we do not prescribe medical Eldoneugenia Suarez in the [facility] nor do we know who does.

## 2021-08-30 NOTE — TELEPHONE ENCOUNTER
Patient would like to get a medical marijuana card. I did advise her that our providers here do not do this. Would like a recommendation on a specialist that would.  She has not been seen here since 5/12/2020 [phone]

## 2021-10-03 ENCOUNTER — APPOINTMENT (OUTPATIENT)
Dept: GENERAL RADIOLOGY | Age: 61
End: 2021-10-03
Attending: EMERGENCY MEDICINE
Payer: MEDICARE

## 2021-10-03 ENCOUNTER — HOSPITAL ENCOUNTER (EMERGENCY)
Age: 61
Discharge: HOME | End: 2021-10-03
Attending: EMERGENCY MEDICINE
Payer: MEDICARE

## 2021-10-03 VITALS
TEMPERATURE: 98.4 F | OXYGEN SATURATION: 98 % | HEIGHT: 64 IN | HEART RATE: 74 BPM | BODY MASS INDEX: 37.68 KG/M2 | SYSTOLIC BLOOD PRESSURE: 171 MMHG | RESPIRATION RATE: 16 BRPM | DIASTOLIC BLOOD PRESSURE: 88 MMHG | WEIGHT: 220.68 LBS

## 2021-10-03 DIAGNOSIS — T30.0 BURN: ICD-10-CM

## 2021-10-03 DIAGNOSIS — T07.XXXA MULTIPLE CONTUSIONS: ICD-10-CM
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
## 2021-10-03 PROCEDURE — 73562 X-RAY EXAM OF KNEE 3: CPT

## 2021-10-03 PROCEDURE — 73630 X-RAY EXAM OF FOOT: CPT

## 2021-10-03 PROCEDURE — 71101 X-RAY EXAM UNILAT RIBS/CHEST: CPT

## 2021-10-03 PROCEDURE — 99282 EMERGENCY DEPT VISIT SF MDM: CPT

## 2021-10-03 RX ORDER — CEPHALEXIN 500 MG/1
500 CAPSULE ORAL 4 TIMES DAILY
Qty: 28 CAPSULE | Refills: 0 | Status: SHIPPED | OUTPATIENT
Start: 2021-10-03 | End: 2021-10-10

## 2021-10-03 NOTE — ED TRIAGE NOTES
Pt presents to ED with c/o left rib pain, right knee pain and pain over lower abdomen from skin wound. Pt states she had a blister from heating pad. There is some erythema noted around and eschar. Pt denies any LOC.

## 2021-11-09 NOTE — ACP (ADVANCE CARE PLANNING)
Advance Care Planning    Advance Care Planning (ACP) Provider Conversation Snapshot    Date of ACP Conversation: 12/27/19  Persons included in Conversation:  patient  Length of ACP Conversation in minutes:  <16 minutes (Non-Billable)    Authorized Decision Maker (if patient is incapable of making informed decisions): This person is:   Stivenedda Newton (patient's son) and Perez Blount (ex-)             For Patients with Decision Making Capacity:   Values/Goals: Exploration of values, goals, and preferences if recovery is not expected, even with continued medical treatment in the event of:  Imminent death  Severe, permanent brain injury   Patient states if she has no quailty of life, she would not like to be resuscitated. But otherwise, she would like to be full code. Conversation Outcomes / Follow-Up Plan:   Patient states she has an advanced directive at home. I told her to bring it to the next visit to be scanned into our files.
no

## 2022-02-09 ENCOUNTER — TRANSCRIBE ORDER (OUTPATIENT)
Dept: SCHEDULING | Age: 62
End: 2022-02-09

## 2022-02-09 DIAGNOSIS — M54.16 LUMBAR RADICULOPATHY: Primary | ICD-10-CM

## 2022-02-14 ENCOUNTER — HOSPITAL ENCOUNTER (OUTPATIENT)
Dept: MRI IMAGING | Age: 62
Discharge: HOME | End: 2022-02-14
Attending: ORTHOPAEDIC SURGERY
Payer: MEDICARE

## 2022-02-14 DIAGNOSIS — M54.16 LUMBAR RADICULOPATHY: ICD-10-CM

## 2022-02-14 PROCEDURE — 72148 MRI LUMBAR SPINE W/O DYE: CPT

## 2022-03-11 ENCOUNTER — HOSPITAL ENCOUNTER (OUTPATIENT)
Dept: PREADMISSION TESTING | Age: 62
Discharge: HOME | End: 2022-03-11

## 2022-03-14 ENCOUNTER — HOSPITAL ENCOUNTER (OUTPATIENT)
Dept: PREADMISSION TESTING | Age: 62
Discharge: HOME | End: 2022-03-14

## 2022-03-18 PROBLEM — M17.9 OSTEOARTHRITIS OF KNEE: Status: ACTIVE | Noted: 2017-01-09

## 2022-03-18 PROBLEM — F41.8 MIXED ANXIETY AND DEPRESSIVE DISORDER: Status: ACTIVE | Noted: 2017-08-23

## 2022-03-19 PROBLEM — F43.10 POSTTRAUMATIC STRESS DISORDER: Status: ACTIVE | Noted: 2020-05-12

## 2022-03-19 PROBLEM — M70.60 TROCHANTERIC BURSITIS: Status: ACTIVE | Noted: 2019-09-19

## 2022-03-19 PROBLEM — R41.3 MEMORY CHANGES: Status: ACTIVE | Noted: 2017-03-06

## 2022-03-19 PROBLEM — S83.249A TEAR OF MEDIAL MENISCUS OF KNEE: Status: ACTIVE | Noted: 2019-11-15

## 2022-03-19 PROBLEM — A49.02 MRSA (METHICILLIN RESISTANT STAPHYLOCOCCUS AUREUS): Status: ACTIVE | Noted: 2020-05-12

## 2022-03-19 PROBLEM — F32.A DEPRESSION: Status: ACTIVE | Noted: 2020-05-12

## 2022-03-19 PROBLEM — R11.0 NAUSEA: Status: ACTIVE | Noted: 2017-02-21

## 2023-02-08 ENCOUNTER — TRANSCRIBE ORDER (OUTPATIENT)
Dept: GENERAL RADIOLOGY | Age: 63
End: 2023-02-08

## 2023-02-08 ENCOUNTER — TRANSCRIBE ORDER (OUTPATIENT)
Dept: SCHEDULING | Age: 63
End: 2023-02-08

## 2023-02-08 ENCOUNTER — HOSPITAL ENCOUNTER (OUTPATIENT)
Dept: CT IMAGING | Age: 63
Discharge: HOME | End: 2023-02-08
Attending: FAMILY MEDICINE
Payer: MEDICARE

## 2023-02-08 DIAGNOSIS — S09.90XA UNSPECIFIED INJURY OF HEAD, INITIAL ENCOUNTER: ICD-10-CM

## 2023-02-08 DIAGNOSIS — S09.90XA UNSPECIFIED INJURY OF HEAD, INITIAL ENCOUNTER: Primary | ICD-10-CM

## 2023-02-08 PROCEDURE — 70450 CT HEAD/BRAIN W/O DYE: CPT

## 2023-02-24 ENCOUNTER — TRANSCRIBE ORDER (OUTPATIENT)
Dept: MAMMOGRAPHY | Age: 63
End: 2023-02-24

## 2023-02-24 DIAGNOSIS — Z12.31 VISIT FOR SCREENING MAMMOGRAM: Primary | ICD-10-CM

## 2023-04-22 DIAGNOSIS — Z12.31 VISIT FOR SCREENING MAMMOGRAM: Primary | ICD-10-CM

## 2023-06-06 ENCOUNTER — TRANSCRIBE ORDERS (OUTPATIENT)
Facility: HOSPITAL | Age: 63
End: 2023-06-06

## 2023-06-06 DIAGNOSIS — Z12.31 BREAST CANCER SCREENING BY MAMMOGRAM: Primary | ICD-10-CM

## 2023-07-17 ENCOUNTER — HOSPITAL ENCOUNTER (OUTPATIENT)
Facility: HOSPITAL | Age: 63
Discharge: HOME | End: 2023-07-20
Payer: MEDICARE

## 2023-07-17 DIAGNOSIS — Z12.31 BREAST CANCER SCREENING BY MAMMOGRAM: ICD-10-CM

## 2023-07-17 PROCEDURE — 77063 BREAST TOMOSYNTHESIS BI: CPT

## 2024-01-03 ENCOUNTER — HOSPITAL ENCOUNTER (OUTPATIENT)
Facility: HOSPITAL | Age: 64
Setting detail: OUTPATIENT SURGERY
Discharge: HOME | End: 2024-01-03
Attending: SPECIALIST | Admitting: SPECIALIST
Payer: MEDICARE

## 2024-01-03 ENCOUNTER — ANESTHESIA (OUTPATIENT)
Facility: HOSPITAL | Age: 64
End: 2024-01-03
Payer: MEDICARE

## 2024-01-03 ENCOUNTER — ANESTHESIA EVENT (OUTPATIENT)
Facility: HOSPITAL | Age: 64
End: 2024-01-03
Payer: MEDICARE

## 2024-01-03 VITALS
HEIGHT: 65 IN | RESPIRATION RATE: 14 BRPM | TEMPERATURE: 97.9 F | WEIGHT: 219.58 LBS | OXYGEN SATURATION: 99 % | BODY MASS INDEX: 36.58 KG/M2 | HEART RATE: 70 BPM | DIASTOLIC BLOOD PRESSURE: 99 MMHG | SYSTOLIC BLOOD PRESSURE: 184 MMHG

## 2024-01-03 PROCEDURE — 3700000000 HC ANESTHESIA ATTENDED CARE: Performed by: SPECIALIST

## 2024-01-03 PROCEDURE — 6360000002 HC RX W HCPCS: Performed by: NURSE ANESTHETIST, CERTIFIED REGISTERED

## 2024-01-03 PROCEDURE — 2580000003 HC RX 258: Performed by: NURSE ANESTHETIST, CERTIFIED REGISTERED

## 2024-01-03 PROCEDURE — 2720000010 HC SURG SUPPLY STERILE: Performed by: SPECIALIST

## 2024-01-03 PROCEDURE — 7100000011 HC PHASE II RECOVERY - ADDTL 15 MIN: Performed by: SPECIALIST

## 2024-01-03 PROCEDURE — 3600007512: Performed by: SPECIALIST

## 2024-01-03 PROCEDURE — 3600007502: Performed by: SPECIALIST

## 2024-01-03 PROCEDURE — 3700000001 HC ADD 15 MINUTES (ANESTHESIA): Performed by: SPECIALIST

## 2024-01-03 PROCEDURE — 88305 TISSUE EXAM BY PATHOLOGIST: CPT

## 2024-01-03 PROCEDURE — 2500000003 HC RX 250 WO HCPCS: Performed by: NURSE ANESTHETIST, CERTIFIED REGISTERED

## 2024-01-03 PROCEDURE — 7100000010 HC PHASE II RECOVERY - FIRST 15 MIN: Performed by: SPECIALIST

## 2024-01-03 RX ORDER — TRAZODONE HYDROCHLORIDE 150 MG/1
150 TABLET ORAL NIGHTLY
COMMUNITY

## 2024-01-03 RX ORDER — SODIUM CHLORIDE 9 MG/ML
INJECTION, SOLUTION INTRAVENOUS CONTINUOUS
Status: DISCONTINUED | OUTPATIENT
Start: 2024-01-03 | End: 2024-01-03 | Stop reason: HOSPADM

## 2024-01-03 RX ORDER — SODIUM CHLORIDE 0.9 % (FLUSH) 0.9 %
5-40 SYRINGE (ML) INJECTION PRN
Status: DISCONTINUED | OUTPATIENT
Start: 2024-01-03 | End: 2024-01-03 | Stop reason: HOSPADM

## 2024-01-03 RX ORDER — PROPOFOL 10 MG/ML
INJECTION, EMULSION INTRAVENOUS PRN
Status: DISCONTINUED | OUTPATIENT
Start: 2024-01-03 | End: 2024-01-03 | Stop reason: SDUPTHER

## 2024-01-03 RX ORDER — 0.9 % SODIUM CHLORIDE 0.9 %
INTRAVENOUS SOLUTION INTRAVENOUS PRN
Status: DISCONTINUED | OUTPATIENT
Start: 2024-01-03 | End: 2024-01-03 | Stop reason: SDUPTHER

## 2024-01-03 RX ORDER — TIZANIDINE 2 MG/1
2 TABLET ORAL EVERY 6 HOURS PRN
COMMUNITY

## 2024-01-03 RX ORDER — HYDROXYZINE HYDROCHLORIDE 10 MG/1
10 TABLET, FILM COATED ORAL 3 TIMES DAILY PRN
COMMUNITY

## 2024-01-03 RX ORDER — LIDOCAINE HYDROCHLORIDE 20 MG/ML
INJECTION, SOLUTION EPIDURAL; INFILTRATION; INTRACAUDAL; PERINEURAL PRN
Status: DISCONTINUED | OUTPATIENT
Start: 2024-01-03 | End: 2024-01-03 | Stop reason: SDUPTHER

## 2024-01-03 RX ORDER — PROCHLORPERAZINE MALEATE 5 MG/1
5 TABLET ORAL EVERY 6 HOURS PRN
COMMUNITY

## 2024-01-03 RX ADMIN — PROPOFOL 100 MCG/KG/MIN: 10 INJECTION, EMULSION INTRAVENOUS at 09:22

## 2024-01-03 RX ADMIN — PROPOFOL 100 MG: 10 INJECTION, EMULSION INTRAVENOUS at 09:21

## 2024-01-03 RX ADMIN — LIDOCAINE HYDROCHLORIDE 60 MG: 20 INJECTION, SOLUTION EPIDURAL; INFILTRATION; INTRACAUDAL; PERINEURAL at 09:22

## 2024-01-03 RX ADMIN — SODIUM CHLORIDE 500 ML: 900 INJECTION, SOLUTION INTRAVENOUS at 09:50

## 2024-01-03 ASSESSMENT — PAIN - FUNCTIONAL ASSESSMENT: PAIN_FUNCTIONAL_ASSESSMENT: 0-10

## 2024-01-03 NOTE — ANESTHESIA POSTPROCEDURE EVALUATION
Department of Anesthesiology  Postprocedure Note    Patient: Rossy Harrington  MRN: [id number]  YOB: 1960  Date of evaluation: 1/3/2024    Procedure Summary       Date: 01/03/24 Room / Location: [facility] 03 / [facility] ENDOSCOPY    Anesthesia Start: 0919 Anesthesia Stop: 0953    Procedures:       COLONOSCOPY (Lower GI Region)      EGD DIAGNOSTIC ONLY (Upper GI Region) Diagnosis:       Screening for malignant neoplasm of colon      (Screening for malignant neoplasm of colon [Z12.11])    Surgeons: Rakesh You MD Responsible Provider: Esme Cutler MD    Anesthesia Type: MAC ASA Status: 3            Anesthesia Type: MAC    David Phase I:      David Phase II: David Score: 10    Anesthesia Post Evaluation    Patient location during evaluation: bedside  Patient participation: complete - patient participated  Level of consciousness: awake and alert and responsive to verbal stimuli  Pain score: 0  Airway patency: patent  Nausea & Vomiting: no nausea and no vomiting  Cardiovascular status: hemodynamically stable  Respiratory status: acceptable and room air  Hydration status: stable  Pain management: adequate    No notable events documented.

## 2024-01-03 NOTE — OP NOTE
[facility]  [facility]  Rakesh You MD  [phone]      January 3, 2024    Esophagogastroduodenoscopy & Colonoscopy Procedure Note  Rossy Harrington  : 1960  [facility] Medical Record Number: [id number]      Indications:   GERD, dysphagia, Screening colonoscopy  Referring Physician:  Michael Brandt MD  Anesthesia/Sedation: Conscious Sedation/Moderate Sedation/MAC  Endoscopist:  Dr. Rakesh You  Complications:  None  Estimated Blood Loss:  None    Permit:  The indications, risks, benefits and alternatives were reviewed with the patient or their decision maker who was provided an opportunity to ask questions and all questions were answered.  The specific risks of esophagogastroduodenoscopy with conscious sedation were reviewed, including but not limited to anesthetic complication, bleeding, adverse drug reaction, missed lesion, infection, IV site reactions, and intestinal perforation which would lead to the need for surgical repair.  Alternatives to EGD and colonoscopy including radiographic imaging, observation without testing, or laboratory testing were reviewed as well as the limitations of those alternatives discussed.  After considering the options and having all their questions answered, the patient or their decision maker provided both verbal and written consent to proceed.      -----------EGD------------   Procedure in Detail:  After obtaining informed consent, positioning of the patient in the left lateral decubitus position, and conduction of a pre-procedure pause or \"time out\" the endoscope was introduced into the mouth and advanced to the duodenum.  A careful inspection was made, and findings or interventions are described below.    Findings:   Esophagus:Grossly normal without inflammation or stenosis/stricture.    I elected to take biopsies of EG junction and mid esophagus for histology.  The we dilated

## 2024-01-03 NOTE — H&P
Also complains FRANCIA and dysphagia.  For EGD as well.        Pre-Endoscopy H&P Update  Chief complaint/HPI/ROS:  The indication for the procedure, the patient's history and the patient's current medications are reviewed prior to the procedure and that data is reported on the H&P to which this document is attached.  Any significant complaints with regard to organ systems will be noted, and if not mentioned then a review of systems is not contributory.  Past Medical History:   Diagnosis Date    ADD (attention deficit disorder)     Anemia     Anxiety     Autoimmune disease (HCC)     fibromyalgia    Burn     Shoulder, back and buttocks  from use of heating pad.    CAD (coronary artery disease)     MI in 1994    Cancer ([facility])     Chronic pain     Fibromyalgia    Chronic pain     Followed by Dr. Eason    Colon cancer ([facility])     Dementia ([facility])     Dysthymic disorder 11/9/2012    PTSD, memory loss short term /  dementia depression    Fibromyalgia     GERD (gastroesophageal reflux disease)     rarely    Herniated cervical disc     Hypertension     Ill-defined condition     dementia    Ill-defined condition     lymphedema in legs    Kidney stone     hx frequently    Lumbago     Lymphedema     Lymphedema     uses compression stockings in daytime, flexitouch pump at night    Memory loss 1/17/2011    Memory loss, short term     MVP (mitral valve prolapse)     Nausea & vomiting     after general anesthesia    PCOS (polycystic ovarian syndrome)     PTSD (post-traumatic stress disorder)     Pulmonary embolism (HCC) X2    Associated with surgery X2    Scoliosis     Sinus infection     saw PCP 3/15/19  started treatment 3/18/18    Stroke (HCC)     april 2014    Sun-damaged skin     Sunburn, blistering     Tanning bed exposure     TIA on medication     TIA L hemiparesis - Was on diet pills 2015    TMJ (dislocation of temporomandibular joint)     Vitamin B 12 deficiency     resolved    Vitamin D deficiency       Past Surgical History:

## 2024-01-03 NOTE — DISCHARGE INSTRUCTIONS
[facility]  [facility]  Rakesh Hazel MD  [phone]      January 3, 2024    Rossy Harrington  YOB: 1960    COLONOSCOPY DISCHARGE INSTRUCTIONS    If there is redness at IV site you should apply warm compress to area.  If redness or soreness persist contact Dr. Hazel' or your primary care doctor.    There may be a slight amount of blood passed from the rectum.  Gaseous discomfort may develop, but walking, belching will help relieve this.  You may not operate a vehicle for 12 hours  You may not operate machinery or dangerous appliances for rest of today  You may not drink alcoholic beverages for 12 hours  Avoid making any critical decisions for 24 hours    DIET:  You may resume your normal diet, but some patients find that heavy or large meals may lead to indigestion or vomiting.  I suggest a light meal as first food intake.    MEDICATIONS:  The use of some over-the-counter pain medication may lead to bleeding after colon biopsies or polyp removal.  Tylenol (also called acetaminophen) is safe to take even if you have had colonoscopy with polyp removal.  Based on the procedure you had today you may not safely take aspirin or aspirin-like products for the next ten (10) days.  Remember that Tylenol (also called acetaminophen) is safe to take after colonoscopy even if you have had biopsies or polyps removed.    ACTIVITY:  You may resume your normal household activities, but it is recommended that you spend the remainder of the day resting -  avoid any strenuous activity.    CALL DR. HAZEL' OFFICE IF:  Increasing pain, nausea, vomiting  Abdominal distension (swelling)  Significant new or increased bleeding (oral or rectal)  Fever/Chills  Chest pain/shortness of breath                       Additional instructions:   Great news, colon is normal, next colonoscopy 10 years.  Upper GI exam revealed gastric bypass but nothing else.

## 2024-01-03 NOTE — H&P
63 y.o. female for open access colonoscopy for screening (last attempted colonoscopy 2019 with poor prep, I'd suggested repeat 6-12 months thereafter but she did not comply until now). Additional data for completion of the targeted pre-endoscopy H&P will be provided under 'H&P interval notes'.  Please see that document which will be attached to this.  Rakesh You MD

## 2024-01-03 NOTE — PERIOP NOTE
0916   Patient has been evaluated by anesthesia pre-procedure.    Patient alert and oriented.     Vital signs will not be charted by the Endoscopy nurse.     All vitals, airway, and loc are monitored by anesthesia staff, Angus, throughout procedure.         0930/0951   Endoscope was pre-cleaned at bedside immediately following procedure by endo techNancy.         0953   Patient tolerated procedure.   Abdomen soft and patient arousable and voices no complaints.   Patient transported to endoscopy recovery area.   Report given to post procedure RNAlona.

## 2024-01-03 NOTE — ANESTHESIA PRE PROCEDURE
Department of Anesthesiology  Preprocedure Note       Name:  Rossy Harrington   Age:  63 y.o.  :  1960                                          MRN:  [id number]         Date:  1/3/2024      Surgeon: Surgeon(s):  Rakesh You MD    Procedure: Procedure(s):  COLONOSCOPY    Medications prior to admission:   Prior to Admission medications    Medication Sig Start Date End Date Taking? Authorizing Provider   ALPRAZolam (XANAX) 1 MG tablet TAKE 1 & 1/2 TABLETS BY MOUTH TWICE DAILY AS NEEDED FOR ANXIETY OR SLEEP. MAX DAILY AMOUNT IS 3MG 20   Automatic Reconciliation, Ar   brexpiprazole (REXULTI) 1 MG TABS tablet Take by mouth daily    Automatic Reconciliation, Ar   bumetanide (BUMEX) 0.5 MG tablet Take by mouth daily    Automatic Reconciliation, Ar   buPROPion (WELLBUTRIN SR) 150 MG extended release tablet Take 1 tablet by mouth daily for 3 days, then increase to 1 tablet twice daily. 20   Automatic Reconciliation, Ar   cyclobenzaprine (FLEXERIL) 10 MG tablet Take by mouth 2 times daily as needed    Automatic Reconciliation, Ar   diphenhydrAMINE (BENADRYL) 25 MG capsule Take by mouth    Automatic Reconciliation, Ar   DULoxetine (CYMBALTA) 60 MG extended release capsule TAKE ONE CAPSULE BY MOUTH TWICE DAILY 10/15/19   Automatic Reconciliation, Ar   EPINEPHrine (EPIPEN) 0.3 MG/0.3ML SOAJ injection Inject 1 Syringe into the skin once 18   Automatic Reconciliation, Ar   FLUoxetine (PROZAC) 40 MG capsule TAKE 1 CAPSULE BY MOUTH EVERY DAY 20   Automatic Reconciliation, Ar   gabapentin (NEURONTIN) 400 MG capsule Take by mouth 3 times daily. 19   Automatic Reconciliation, Ar   ibuprofen (ADVIL;MOTRIN) 600 MG tablet TAKE 1 TABLET BY MOUTH EVERY 6 HOURS AS NEEDED FOR PAIN 20   Automatic Reconciliation, Ar   lidocaine (LIDODERM) 5 % Lidoderm 5 % topical patch  APPLY 1 PATCH TOPICALLY ONCE DAILY. MAY WEAR UP TO 12 HOURS    Automatic Reconciliation, Ar   losartan (COZAAR) 50 MG tablet Take 1 
What Type Of Note Output Would You Prefer (Optional)?: Standard Output
Is The Patient Presenting As Previously Scheduled?: Yes
How Severe Is Your Rash?: mild
Is This A New Presentation, Or A Follow-Up?: Rash

## 2024-01-03 NOTE — PROGRESS NOTES
Rossy L Holmes  1960  [id number]    Situation:  Verbal report received from: DANYEL Gudino  Procedure: Procedure(s):  COLONOSCOPY  EGD DIAGNOSTIC ONLY    Background:    Preoperative diagnosis: Screening for malignant neoplasm of colon [Z12.11]  Postoperative diagnosis: * No post-op diagnosis entered *    :  Dr. You  Assistant(s): Circulator: Terese Angel RN  Endoscopy Technician: Nancy De    Specimens: @[facility]@  H. Pylori  no    Assessment:      Anesthesia gave intra-procedure sedation and medications, see anesthesia flow sheet yes    Intravenous fluids: NS@ KVO     Vital signs stable yes    Abdominal assessment: round and soft yes    Recommendation:  Discharge patient per MD order yes.    Family:  sister  Permission to share finding with family or friend yes      Endoscopy discharge instructions have been reviewed and given to patient.  The patient verbalized understanding and acceptance of instructions.      Dr. You discussed with sister procedure findings and next steps.

## 2024-01-08 ENCOUNTER — HOSPITAL ENCOUNTER (EMERGENCY)
Facility: HOSPITAL | Age: 64
Discharge: HOME | End: 2024-01-08
Attending: EMERGENCY MEDICINE
Payer: MEDICARE

## 2024-01-08 ENCOUNTER — APPOINTMENT (OUTPATIENT)
Facility: HOSPITAL | Age: 64
End: 2024-01-08
Payer: MEDICARE

## 2024-01-08 VITALS
WEIGHT: 223 LBS | RESPIRATION RATE: 17 BRPM | DIASTOLIC BLOOD PRESSURE: 79 MMHG | TEMPERATURE: 98.1 F | SYSTOLIC BLOOD PRESSURE: 154 MMHG | BODY MASS INDEX: 37.15 KG/M2 | HEIGHT: 65 IN | OXYGEN SATURATION: 90 % | HEART RATE: 80 BPM

## 2024-01-08 DIAGNOSIS — R06.00 DYSPNEA, UNSPECIFIED TYPE: Primary | ICD-10-CM

## 2024-01-08 LAB
ALBUMIN SERPL-MCNC: 3 G/DL (ref 3.5–5)
ALBUMIN/GLOB SERPL: 0.9 (ref 1.1–2.2)
ALP SERPL-CCNC: 127 U/L (ref 45–117)
ALT SERPL-CCNC: 30 U/L (ref 12–78)
ANION GAP SERPL CALC-SCNC: 6 MMOL/L (ref 5–15)
AST SERPL-CCNC: 23 U/L (ref 15–37)
BASOPHILS # BLD: 0 K/UL (ref 0–0.1)
BASOPHILS NFR BLD: 0 % (ref 0–1)
BILIRUB SERPL-MCNC: 0.5 MG/DL (ref 0.2–1)
BUN SERPL-MCNC: 28 MG/DL (ref 6–20)
BUN/CREAT SERPL: 30 (ref 12–20)
CALCIUM SERPL-MCNC: 8.2 MG/DL (ref 8.5–10.1)
CHLORIDE SERPL-SCNC: 107 MMOL/L (ref 97–108)
CO2 SERPL-SCNC: 28 MMOL/L (ref 21–32)
COMMENT:: NORMAL
CREAT SERPL-MCNC: 0.94 MG/DL (ref 0.55–1.02)
D DIMER PPP FEU-MCNC: 0.59 MG/L FEU (ref 0–0.65)
DIFFERENTIAL METHOD BLD: ABNORMAL
EOSINOPHIL # BLD: 0 K/UL (ref 0–0.4)
EOSINOPHIL NFR BLD: 0 % (ref 0–7)
ERYTHROCYTE [DISTWIDTH] IN BLOOD BY AUTOMATED COUNT: 15.6 % (ref 11.5–14.5)
GLOBULIN SER CALC-MCNC: 3.3 G/DL (ref 2–4)
GLUCOSE SERPL-MCNC: 90 MG/DL (ref 65–100)
HCT VFR BLD AUTO: 34 % (ref 35–47)
HGB BLD-MCNC: 10.7 G/DL (ref 11.5–16)
IMM GRANULOCYTES # BLD AUTO: 0 K/UL (ref 0–0.04)
IMM GRANULOCYTES NFR BLD AUTO: 0 % (ref 0–0.5)
LYMPHOCYTES # BLD: 1.3 K/UL (ref 0.8–3.5)
LYMPHOCYTES NFR BLD: 22 % (ref 12–49)
MCH RBC QN AUTO: 26.3 PG (ref 26–34)
MCHC RBC AUTO-ENTMCNC: 31.5 G/DL (ref 30–36.5)
MCV RBC AUTO: 83.5 FL (ref 80–99)
MONOCYTES # BLD: 0.6 K/UL (ref 0–1)
MONOCYTES NFR BLD: 10 % (ref 5–13)
NEUTS SEG # BLD: 3.9 K/UL (ref 1.8–8)
NEUTS SEG NFR BLD: 68 % (ref 32–75)
NRBC # BLD: 0 K/UL (ref 0–0.01)
NRBC BLD-RTO: 0 PER 100 WBC
NT PRO BNP: 349 PG/ML (ref 0–125)
PLATELET # BLD AUTO: 166 K/UL (ref 150–400)
PMV BLD AUTO: 11.4 FL (ref 8.9–12.9)
POTASSIUM SERPL-SCNC: 4.6 MMOL/L (ref 3.5–5.1)
PROT SERPL-MCNC: 6.3 G/DL (ref 6.4–8.2)
RBC # BLD AUTO: 4.07 M/UL (ref 3.8–5.2)
SODIUM SERPL-SCNC: 141 MMOL/L (ref 136–145)
SPECIMEN HOLD: NORMAL
WBC # BLD AUTO: 5.8 K/UL (ref 3.6–11)

## 2024-01-08 PROCEDURE — 83880 ASSAY OF NATRIURETIC PEPTIDE: CPT

## 2024-01-08 PROCEDURE — 85379 FIBRIN DEGRADATION QUANT: CPT

## 2024-01-08 PROCEDURE — 93005 ELECTROCARDIOGRAM TRACING: CPT | Performed by: EMERGENCY MEDICINE

## 2024-01-08 PROCEDURE — 36415 COLL VENOUS BLD VENIPUNCTURE: CPT

## 2024-01-08 PROCEDURE — 71046 X-RAY EXAM CHEST 2 VIEWS: CPT

## 2024-01-08 PROCEDURE — 80053 COMPREHEN METABOLIC PANEL: CPT

## 2024-01-08 PROCEDURE — 6370000000 HC RX 637 (ALT 250 FOR IP): Performed by: EMERGENCY MEDICINE

## 2024-01-08 PROCEDURE — 99285 EMERGENCY DEPT VISIT HI MDM: CPT

## 2024-01-08 PROCEDURE — 85025 COMPLETE CBC W/AUTO DIFF WBC: CPT

## 2024-01-08 RX ORDER — IPRATROPIUM BROMIDE AND ALBUTEROL SULFATE 2.5; .5 MG/3ML; MG/3ML
1 SOLUTION RESPIRATORY (INHALATION) ONCE
Status: COMPLETED | OUTPATIENT
Start: 2024-01-08 | End: 2024-01-08

## 2024-01-08 RX ORDER — PREDNISONE 20 MG/1
40 TABLET ORAL DAILY
Qty: 10 TABLET | Refills: 0 | Status: SHIPPED | OUTPATIENT
Start: 2024-01-08 | End: 2024-01-13

## 2024-01-08 RX ORDER — ALBUTEROL SULFATE 90 UG/1
2 AEROSOL, METERED RESPIRATORY (INHALATION) 4 TIMES DAILY PRN
Qty: 18 G | Refills: 0 | Status: SHIPPED | OUTPATIENT
Start: 2024-01-08

## 2024-01-08 RX ORDER — PREDNISONE 20 MG/1
40 TABLET ORAL ONCE
Status: COMPLETED | OUTPATIENT
Start: 2024-01-08 | End: 2024-01-08

## 2024-01-08 RX ORDER — BENZONATATE 100 MG/1
100 CAPSULE ORAL 3 TIMES DAILY PRN
Qty: 15 CAPSULE | Refills: 0 | Status: SHIPPED | OUTPATIENT
Start: 2024-01-08 | End: 2024-01-13

## 2024-01-08 RX ADMIN — PREDNISONE 40 MG: 20 TABLET ORAL at 19:07

## 2024-01-08 RX ADMIN — IPRATROPIUM BROMIDE AND ALBUTEROL SULFATE 1 DOSE: .5; 3 SOLUTION RESPIRATORY (INHALATION) at 18:15

## 2024-01-08 ASSESSMENT — PAIN DESCRIPTION - DESCRIPTORS: DESCRIPTORS: ACHING

## 2024-01-08 ASSESSMENT — PAIN SCALES - GENERAL: PAINLEVEL_OUTOF10: 5

## 2024-01-08 ASSESSMENT — PAIN - FUNCTIONAL ASSESSMENT: PAIN_FUNCTIONAL_ASSESSMENT: 0-10

## 2024-01-08 ASSESSMENT — PAIN DESCRIPTION - LOCATION: LOCATION: HEAD

## 2024-01-08 NOTE — ED PROVIDER NOTES
01/08/24 1603   BP: (!) 144/68   Pulse: 76   Resp: 18   Temp: 98.1 °F (36.7 °C)   TempSrc: Oral   SpO2: 96%   Weight: 101.2 kg (223 lb)   Height: 1.651 m (5' 5\")           Medical Decision Making  63-year-old female presents emergency department chief complaint of increasing shortness of breath at night.  Patient states she does wake up during the night and feels like she cannot breathe.  Denies a history of PE or DVT.  Patient recently had an endoscopy and and was advised that they had abraded her throat.  She does complain of mild sore throat but her main complaint is feeling like she cannot take a deep breath.  Physical exam she is obese, decreased breath sounds throughout.  Chronic lymphedema.  Consider reactive airway disease versus infectious process versus PE versus congestive heart failure.  Chest x-ray has already been done and that was negative.  Will order other lab work    Amount and/or Complexity of Data Reviewed  Labs: ordered.  Radiology: ordered.  ECG/medicine tests: ordered.    Risk  Prescription drug management.            REASSESSMENT     ED Course as of 01/08/24 1855   Mon Jan 08, 2024   1818 EKG: Sinus rhythm marked sinus arrhythmia, ventricular rate 73, , QRS 86, QTc of 442, no acute ST changes   [KP]      ED Course User Index  [KP] Shandra Verduzco,        CBC and metabolic panel were within normal limits.  Minimal elevation in BNP.  Lung sounds significantly improved after DuoNeb.  Will treat her for more of a reactive airway disease presentation. Patient states she has neb machine at home    CONSULTS:  None    PROCEDURES:  Unless otherwise noted below, none     Procedures      FINAL IMPRESSION      1. Dyspnea, unspecified type          DISPOSITION/PLAN   DISPOSITION Decision To Discharge 01/08/2024 06:54:36 PM      PATIENT REFERRED TO:  No follow-up provider specified.    DISCHARGE MEDICATIONS:  New Prescriptions    ALBUTEROL SULFATE HFA (VENTOLIN HFA) 108 (90 BASE) MCG/ACT INHALER

## 2024-01-08 NOTE — ED TRIAGE NOTES
GCS 15. Patient ambulatory to treatment area. Patient noted to be dyspneic with exertion. Patient states she had an endoscopy last Thursday and was told they scratched her throat. Patient states the last 3 days she has had increasing SOB, and headache.

## 2024-01-09 LAB
EKG ATRIAL RATE: 73 BPM
EKG DIAGNOSIS: NORMAL
EKG P AXIS: 17 DEGREES
EKG P-R INTERVAL: 154 MS
EKG Q-T INTERVAL: 402 MS
EKG QRS DURATION: 86 MS
EKG QTC CALCULATION (BAZETT): 442 MS
EKG R AXIS: -4 DEGREES
EKG T AXIS: 8 DEGREES
EKG VENTRICULAR RATE: 73 BPM

## 2024-01-09 PROCEDURE — 93010 ELECTROCARDIOGRAM REPORT: CPT | Performed by: INTERNAL MEDICINE

## 2025-04-04 ENCOUNTER — HOSPITAL ENCOUNTER (EMERGENCY)
Facility: HOSPITAL | Age: 65
Discharge: HOME | End: 2025-04-04
Attending: EMERGENCY MEDICINE
Payer: MEDICARE

## 2025-04-04 VITALS
HEIGHT: 65 IN | DIASTOLIC BLOOD PRESSURE: 75 MMHG | BODY MASS INDEX: 34.99 KG/M2 | WEIGHT: 210 LBS | RESPIRATION RATE: 20 BRPM | TEMPERATURE: 97.6 F | OXYGEN SATURATION: 93 % | HEART RATE: 78 BPM | SYSTOLIC BLOOD PRESSURE: 132 MMHG

## 2025-04-04 DIAGNOSIS — E86.0 DEHYDRATION: Primary | ICD-10-CM

## 2025-04-04 LAB
ALBUMIN SERPL-MCNC: 3 G/DL (ref 3.5–5)
ALBUMIN/GLOB SERPL: 1.1 (ref 1.1–2.2)
ALP SERPL-CCNC: 186 U/L (ref 45–117)
ALT SERPL-CCNC: 66 U/L (ref 12–78)
ANION GAP SERPL CALC-SCNC: 4 MMOL/L (ref 2–12)
APPEARANCE UR: CLEAR
AST SERPL-CCNC: 50 U/L (ref 15–37)
BACTERIA URNS QL MICRO: NEGATIVE /HPF
BASOPHILS # BLD: 0 K/UL (ref 0–0.1)
BASOPHILS NFR BLD: 0 % (ref 0–1)
BILIRUB SERPL-MCNC: 0.5 MG/DL (ref 0.2–1)
BILIRUB UR QL: NEGATIVE
BUN SERPL-MCNC: 31 MG/DL (ref 6–20)
BUN/CREAT SERPL: 18 (ref 12–20)
CALCIUM SERPL-MCNC: 8.3 MG/DL (ref 8.5–10.1)
CHLORIDE SERPL-SCNC: 111 MMOL/L (ref 97–108)
CO2 SERPL-SCNC: 27 MMOL/L (ref 21–32)
COLOR UR: ABNORMAL
CREAT SERPL-MCNC: 1.7 MG/DL (ref 0.55–1.02)
DIFFERENTIAL METHOD BLD: NORMAL
EOSINOPHIL # BLD: 0 K/UL (ref 0–0.4)
EOSINOPHIL NFR BLD: 0 % (ref 0–7)
EPITH CASTS URNS QL MICRO: ABNORMAL /LPF
ERYTHROCYTE [DISTWIDTH] IN BLOOD BY AUTOMATED COUNT: 13.4 % (ref 11.5–14.5)
GLOBULIN SER CALC-MCNC: 2.8 G/DL (ref 2–4)
GLUCOSE SERPL-MCNC: 100 MG/DL (ref 65–100)
GLUCOSE UR STRIP.AUTO-MCNC: NEGATIVE MG/DL
HCT VFR BLD AUTO: 37 % (ref 35–47)
HGB BLD-MCNC: 11.9 G/DL (ref 11.5–16)
HGB UR QL STRIP: NEGATIVE
IMM GRANULOCYTES # BLD AUTO: 0.02 K/UL (ref 0–0.04)
IMM GRANULOCYTES NFR BLD AUTO: 0.3 % (ref 0–0.5)
KETONES UR QL STRIP.AUTO: ABNORMAL MG/DL
LACTATE BLD-SCNC: 0.97 MMOL/L (ref 0.4–2)
LEUKOCYTE ESTERASE UR QL STRIP.AUTO: ABNORMAL
LYMPHOCYTES # BLD: 1.61 K/UL (ref 0.8–3.5)
LYMPHOCYTES NFR BLD: 26.1 % (ref 12–49)
MCH RBC QN AUTO: 29.2 PG (ref 26–34)
MCHC RBC AUTO-ENTMCNC: 32.2 G/DL (ref 30–36.5)
MCV RBC AUTO: 90.9 FL (ref 80–99)
MONOCYTES # BLD: 0.58 K/UL (ref 0–1)
MONOCYTES NFR BLD: 9.4 % (ref 5–13)
NEUTS SEG # BLD: 3.95 K/UL (ref 1.8–8)
NEUTS SEG NFR BLD: 64.2 % (ref 32–75)
NITRITE UR QL STRIP.AUTO: NEGATIVE
NRBC # BLD: 0 K/UL (ref 0–0.01)
NRBC BLD-RTO: 0 PER 100 WBC
PH UR STRIP: 5 (ref 5–8)
PLATELET # BLD AUTO: 186 K/UL (ref 150–400)
PMV BLD AUTO: 10.6 FL (ref 8.9–12.9)
POTASSIUM SERPL-SCNC: 4.4 MMOL/L (ref 3.5–5.1)
PROT SERPL-MCNC: 5.8 G/DL (ref 6.4–8.2)
PROT UR STRIP-MCNC: ABNORMAL MG/DL
RBC # BLD AUTO: 4.07 M/UL (ref 3.8–5.2)
RBC #/AREA URNS HPF: ABNORMAL /HPF (ref 0–5)
SODIUM SERPL-SCNC: 142 MMOL/L (ref 136–145)
SP GR UR REFRACTOMETRY: 1.02 (ref 1–1.03)
TROPONIN I SERPL HS-MCNC: 9 NG/L (ref 0–51)
URINE CULTURE IF INDICATED: ABNORMAL
UROBILINOGEN UR QL STRIP.AUTO: 1 EU/DL (ref 0.2–1)
WBC # BLD AUTO: 6.2 K/UL (ref 3.6–11)
WBC URNS QL MICRO: ABNORMAL /HPF (ref 0–4)

## 2025-04-04 PROCEDURE — 36415 COLL VENOUS BLD VENIPUNCTURE: CPT

## 2025-04-04 PROCEDURE — 99284 EMERGENCY DEPT VISIT MOD MDM: CPT

## 2025-04-04 PROCEDURE — 2580000003 HC RX 258: Performed by: EMERGENCY MEDICINE

## 2025-04-04 PROCEDURE — 83605 ASSAY OF LACTIC ACID: CPT

## 2025-04-04 PROCEDURE — 93005 ELECTROCARDIOGRAM TRACING: CPT | Performed by: EMERGENCY MEDICINE

## 2025-04-04 PROCEDURE — 84484 ASSAY OF TROPONIN QUANT: CPT

## 2025-04-04 PROCEDURE — 80053 COMPREHEN METABOLIC PANEL: CPT

## 2025-04-04 PROCEDURE — 96360 HYDRATION IV INFUSION INIT: CPT

## 2025-04-04 PROCEDURE — 85025 COMPLETE CBC W/AUTO DIFF WBC: CPT

## 2025-04-04 PROCEDURE — 81001 URINALYSIS AUTO W/SCOPE: CPT

## 2025-04-04 PROCEDURE — 96361 HYDRATE IV INFUSION ADD-ON: CPT

## 2025-04-04 PROCEDURE — 87040 BLOOD CULTURE FOR BACTERIA: CPT

## 2025-04-04 RX ORDER — 0.9 % SODIUM CHLORIDE 0.9 %
2000 INTRAVENOUS SOLUTION INTRAVENOUS ONCE
Status: COMPLETED | OUTPATIENT
Start: 2025-04-04 | End: 2025-04-04

## 2025-04-04 RX ADMIN — SODIUM CHLORIDE 2000 ML: 0.9 INJECTION, SOLUTION INTRAVENOUS at 16:17

## 2025-04-04 ASSESSMENT — ENCOUNTER SYMPTOMS
BACK PAIN: 0
CHEST TIGHTNESS: 0
SHORTNESS OF BREATH: 0
SORE THROAT: 0
EYE PAIN: 0
DIARRHEA: 1
ABDOMINAL PAIN: 0
VOMITING: 0

## 2025-04-04 ASSESSMENT — PAIN - FUNCTIONAL ASSESSMENT: PAIN_FUNCTIONAL_ASSESSMENT: 0-10

## 2025-04-04 ASSESSMENT — PAIN SCALES - GENERAL: PAINLEVEL_OUTOF10: 0

## 2025-04-04 NOTE — ED TRIAGE NOTES
Pt arrives to ED via EMS from home with complaints of leg cramps, dizziness, headache, nausea x5 days.  Pt reports 2 falls over the last 2 days due to dizziness but denies hitting head. Pt denies CP, SOB, pain.  Pt denies being around anyone sick. Pt normally able to ambulate without feeling dizzy.  Pt has long medication list and states \"I have lots of health problems, but I can't remember them all\".  Pt appears sleepy in triage.  BP for EMS 80/40.  Pt given 400ml of NS in route.  PT reports low BP last week at the doctors but \" they didn't do anything about it\".

## 2025-04-04 NOTE — ED PROVIDER NOTES
[facility] EMERGENCY DEPARTMENT  EMERGENCY DEPARTMENT ENCOUNTER      Pt Name: Rossy Harrington  MRN: [id number]  Birthdate 1960  Date of evaluation: 4/4/2025  Provider: Dong Niño MD      HISTORY OF PRESENT ILLNESS      64-year-old female with a history of chronic back pain, fibromyalgia, memory loss presenting to the ER for dizziness and cramps.  Patient reports she has been dealing with 5 days of dizziness and lightheadedness.  Reports she has had bad cramps in her arms and legs.  States that her mouth feels dry and she feels dehydrated.  States that she has been eating and drinking okay at home.  She had a couple episodes of diarrhea yesterday.  Denies fevers, does report recent illness with congestion.              Nursing Notes were reviewed.    REVIEW OF SYSTEMS         Review of Systems   Constitutional:  Negative for chills and fever.   HENT:  Positive for congestion. Negative for sore throat.    Eyes:  Negative for pain and visual disturbance.   Respiratory:  Negative for chest tightness and shortness of breath.    Cardiovascular:  Negative for chest pain and leg swelling.   Gastrointestinal:  Positive for diarrhea. Negative for abdominal pain and vomiting.   Genitourinary:  Negative for dysuria.   Musculoskeletal:  Positive for myalgias. Negative for back pain.   Skin:  Negative for rash.   Neurological:  Positive for dizziness and headaches. Negative for weakness.   All other systems reviewed and are negative.          PAST MEDICAL HISTORY     Past Medical History:   Diagnosis Date    ADD (attention deficit disorder)     Anemia     Anxiety     Autoimmune disease     fibromyalgia    Burn     Shoulder, back and buttocks  from use of heating pad.    CAD (coronary artery disease)     MI in 1994    Cancer (HCC)     Chronic pain     Fibromyalgia    Chronic pain     Followed by Dr. Eason    Colon cancer ([facility])     Dementia ([facility])     Dysthymic disorder 11/9/2012    PTSD, memory loss

## 2025-04-05 LAB
EKG ATRIAL RATE: 65 BPM
EKG DIAGNOSIS: NORMAL
EKG P AXIS: 50 DEGREES
EKG P-R INTERVAL: 166 MS
EKG Q-T INTERVAL: 424 MS
EKG QRS DURATION: 82 MS
EKG QTC CALCULATION (BAZETT): 440 MS
EKG R AXIS: -11 DEGREES
EKG T AXIS: 35 DEGREES
EKG VENTRICULAR RATE: 65 BPM

## 2025-04-05 PROCEDURE — 93010 ELECTROCARDIOGRAM REPORT: CPT | Performed by: INTERNAL MEDICINE

## 2025-04-06 LAB
BACTERIA SPEC CULT: NORMAL
BACTERIA SPEC CULT: NORMAL
SERVICE CMNT-IMP: NORMAL
SERVICE CMNT-IMP: NORMAL

## 2025-04-24 ENCOUNTER — APPOINTMENT (OUTPATIENT)
Facility: HOSPITAL | Age: 65
End: 2025-04-24
Payer: MEDICARE

## 2025-04-24 ENCOUNTER — HOSPITAL ENCOUNTER (EMERGENCY)
Facility: HOSPITAL | Age: 65
Discharge: HOME | End: 2025-04-24
Attending: EMERGENCY MEDICINE
Payer: MEDICARE

## 2025-04-24 VITALS
RESPIRATION RATE: 11 BRPM | WEIGHT: 212 LBS | OXYGEN SATURATION: 97 % | SYSTOLIC BLOOD PRESSURE: 135 MMHG | HEART RATE: 67 BPM | TEMPERATURE: 97.8 F | DIASTOLIC BLOOD PRESSURE: 66 MMHG | BODY MASS INDEX: 36.19 KG/M2 | HEIGHT: 64 IN

## 2025-04-24 DIAGNOSIS — N28.9 ACUTE RENAL INSUFFICIENCY: Primary | ICD-10-CM

## 2025-04-24 DIAGNOSIS — I95.9 TRANSIENT HYPOTENSION: ICD-10-CM

## 2025-04-24 LAB
ALBUMIN SERPL-MCNC: 3.2 G/DL (ref 3.5–5)
ALBUMIN/GLOB SERPL: 1.1 (ref 1.1–2.2)
ALP SERPL-CCNC: 172 U/L (ref 45–117)
ALT SERPL-CCNC: 34 U/L (ref 12–78)
ANION GAP SERPL CALC-SCNC: 5 MMOL/L (ref 2–12)
AST SERPL-CCNC: 31 U/L (ref 15–37)
BASOPHILS # BLD: 0 K/UL (ref 0–0.1)
BASOPHILS NFR BLD: 0 % (ref 0–1)
BILIRUB SERPL-MCNC: 0.3 MG/DL (ref 0.2–1)
BUN SERPL-MCNC: 30 MG/DL (ref 6–20)
BUN/CREAT SERPL: 19 (ref 12–20)
CALCIUM SERPL-MCNC: 8.4 MG/DL (ref 8.5–10.1)
CHLORIDE SERPL-SCNC: 107 MMOL/L (ref 97–108)
CO2 SERPL-SCNC: 28 MMOL/L (ref 21–32)
COMMENT:: NORMAL
CREAT SERPL-MCNC: 1.6 MG/DL (ref 0.55–1.02)
DIFFERENTIAL METHOD BLD: ABNORMAL
EOSINOPHIL # BLD: 0 K/UL (ref 0–0.4)
EOSINOPHIL NFR BLD: 0 % (ref 0–7)
ERYTHROCYTE [DISTWIDTH] IN BLOOD BY AUTOMATED COUNT: 14 % (ref 11.5–14.5)
GLOBULIN SER CALC-MCNC: 3 G/DL (ref 2–4)
GLUCOSE SERPL-MCNC: 91 MG/DL (ref 65–100)
HCT VFR BLD AUTO: 38.7 % (ref 35–47)
HGB BLD-MCNC: 12.7 G/DL (ref 11.5–16)
IMM GRANULOCYTES # BLD AUTO: 0.03 K/UL (ref 0–0.04)
IMM GRANULOCYTES NFR BLD AUTO: 0.6 % (ref 0–0.5)
LIPASE SERPL-CCNC: 49 U/L (ref 13–75)
LYMPHOCYTES # BLD: 1.71 K/UL (ref 0.8–3.5)
LYMPHOCYTES NFR BLD: 31.6 % (ref 12–49)
MCH RBC QN AUTO: 29.5 PG (ref 26–34)
MCHC RBC AUTO-ENTMCNC: 32.8 G/DL (ref 30–36.5)
MCV RBC AUTO: 90 FL (ref 80–99)
MONOCYTES # BLD: 0.54 K/UL (ref 0–1)
MONOCYTES NFR BLD: 10 % (ref 5–13)
NEUTS SEG # BLD: 3.13 K/UL (ref 1.8–8)
NEUTS SEG NFR BLD: 57.8 % (ref 32–75)
NRBC # BLD: 0 K/UL (ref 0–0.01)
NRBC BLD-RTO: 0 PER 100 WBC
PLATELET # BLD AUTO: 175 K/UL (ref 150–400)
PMV BLD AUTO: 11.3 FL (ref 8.9–12.9)
POTASSIUM SERPL-SCNC: 5 MMOL/L (ref 3.5–5.1)
PROT SERPL-MCNC: 6.2 G/DL (ref 6.4–8.2)
RBC # BLD AUTO: 4.3 M/UL (ref 3.8–5.2)
SODIUM SERPL-SCNC: 140 MMOL/L (ref 136–145)
SPECIMEN HOLD: NORMAL
TROPONIN I SERPL HS-MCNC: 8 NG/L (ref 0–51)
WBC # BLD AUTO: 5.4 K/UL (ref 3.6–11)

## 2025-04-24 PROCEDURE — 93005 ELECTROCARDIOGRAM TRACING: CPT | Performed by: EMERGENCY MEDICINE

## 2025-04-24 PROCEDURE — 6360000002 HC RX W HCPCS: Performed by: EMERGENCY MEDICINE

## 2025-04-24 PROCEDURE — 80053 COMPREHEN METABOLIC PANEL: CPT

## 2025-04-24 PROCEDURE — 85025 COMPLETE CBC W/AUTO DIFF WBC: CPT

## 2025-04-24 PROCEDURE — 36415 COLL VENOUS BLD VENIPUNCTURE: CPT

## 2025-04-24 PROCEDURE — 70450 CT HEAD/BRAIN W/O DYE: CPT

## 2025-04-24 PROCEDURE — 84484 ASSAY OF TROPONIN QUANT: CPT

## 2025-04-24 PROCEDURE — 99284 EMERGENCY DEPT VISIT MOD MDM: CPT

## 2025-04-24 PROCEDURE — 2580000003 HC RX 258: Performed by: EMERGENCY MEDICINE

## 2025-04-24 PROCEDURE — 96374 THER/PROPH/DIAG INJ IV PUSH: CPT

## 2025-04-24 PROCEDURE — 83690 ASSAY OF LIPASE: CPT

## 2025-04-24 RX ORDER — SODIUM CHLORIDE, SODIUM LACTATE, POTASSIUM CHLORIDE, AND CALCIUM CHLORIDE .6; .31; .03; .02 G/100ML; G/100ML; G/100ML; G/100ML
1000 INJECTION, SOLUTION INTRAVENOUS ONCE
Status: COMPLETED | OUTPATIENT
Start: 2025-04-24 | End: 2025-04-24

## 2025-04-24 RX ORDER — FLUDROCORTISONE ACETATE 0.1 MG/1
0.1 TABLET ORAL DAILY
COMMUNITY
Start: 2025-04-23

## 2025-04-24 RX ORDER — DROPERIDOL 2.5 MG/ML
1.25 INJECTION, SOLUTION INTRAMUSCULAR; INTRAVENOUS ONCE
Status: COMPLETED | OUTPATIENT
Start: 2025-04-24 | End: 2025-04-24

## 2025-04-24 RX ADMIN — DROPERIDOL 1.25 MG: 2.5 INJECTION, SOLUTION INTRAMUSCULAR; INTRAVENOUS at 16:35

## 2025-04-24 RX ADMIN — SODIUM CHLORIDE, SODIUM LACTATE, POTASSIUM CHLORIDE, AND CALCIUM CHLORIDE 1000 ML: .6; .31; .03; .02 INJECTION, SOLUTION INTRAVENOUS at 16:37

## 2025-04-24 ASSESSMENT — LIFESTYLE VARIABLES
HOW MANY STANDARD DRINKS CONTAINING ALCOHOL DO YOU HAVE ON A TYPICAL DAY: PATIENT DOES NOT DRINK
HOW OFTEN DO YOU HAVE A DRINK CONTAINING ALCOHOL: NEVER

## 2025-04-24 NOTE — ED TRIAGE NOTES
Pt arrived via [facility]. Ambulated from the stretcher in the alonso to the room stretcher with a steady gait. Report of \"dizzy for 2 weeks headache started today 10am VSS nausea no vomiting HX gastric bipass stroke scale neg.\" Pt states \"I have been intermittently dizzy for 2 weeks with nausea my blood pressure goes up and down it has been as low as 80/57 it was low like that today. The headache started at 10 am its the top of my head and behind my eyes I took ibuprofen, oxycodone, compazine and nothing has helped I have had diarrhea on and off too. I was at my doctor yesterday my pressure was low I told him all my symptoms he said to see a cardiologist \"

## 2025-04-24 NOTE — ED NOTES
Pt discharged to home a this time. All discharge instructions provided to patient. All questions answered. IV removed. No distress noted at time of discharge.

## 2025-04-25 NOTE — ED PROVIDER NOTES
[place] EMERGENCY DEPARTMENT  EMERGENCY DEPARTMENT ENCOUNTER      Pt Name: Rossy Harrington  MRN: [id number]  Birthdate 1960  Date of evaluation: 4/24/2025  Provider: Tc Batista MD    CHIEF COMPLAINT       Chief Complaint   Patient presents with    Dizziness    Nausea    Headache       ALLERGIES     Bee venom    ENCOUNTER     ***PASTE SAYVANT NOTE HERE***    ED Triage Vitals   BP Systolic BP Percentile Diastolic BP Percentile Temp Temp Source Pulse Respirations SpO2   04/24/25 1600 -- -- 04/24/25 1600 04/24/25 1600 04/24/25 1600 04/24/25 1600 04/24/25 1600   128/62   97.8 °F (36.6 °C) Oral 71 13 94 %      Height Weight - Scale         04/24/25 1636 04/24/25 1636         1.626 m (5' 4\") 96.2 kg (212 lb)             CURRENT MEDICATIONS       Discharge Medication List as of 4/24/2025  6:07 PM        CONTINUE these medications which have NOT CHANGED    Details   fludrocortisone (FLORINEF) 0.1 MG tablet Take 1 tablet by mouth dailyHistorical Med      albuterol sulfate HFA (VENTOLIN HFA) 108 (90 Base) MCG/ACT inhaler Inhale 2 puffs into the lungs 4 times daily as needed for Wheezing, Disp-18 g, R-0Normal      prochlorperazine (COMPAZINE) 5 MG tablet Take 1 tablet by mouth every 6 hours as needed for Nausea (pt unsure of dose)Historical Med      tiZANidine (ZANAFLEX) 2 MG tablet Take 1 tablet by mouth every 6 hours as needed (pt unsure of dose)Historical Med      traZODone (DESYREL) 150 MG tablet Take 1 tablet by mouth nightlyHistorical Med      hydrOXYzine HCl (ATARAX) 10 MG tablet Take 1 tablet by mouth 3 times daily as needed for Itching (pt unsure of dose)Historical Med      ALPRAZolam (XANAX) 1 MG tablet TAKE 1 & 1/2 TABLETS BY MOUTH TWICE DAILY AS NEEDED FOR ANXIETY OR SLEEP. MAX DAILY AMOUNT IS 3MGHistorical Med      brexpiprazole (REXULTI) 1 MG TABS tablet Take by mouth dailyHistorical Med      bumetanide (BUMEX) 0.5 MG tablet Take by mouth dailyHistorical Med      buPROPion (WELLBUTRIN SR) 150 MG

## 2025-04-26 LAB
EKG ATRIAL RATE: 69 BPM
EKG DIAGNOSIS: NORMAL
EKG P AXIS: 9 DEGREES
EKG P-R INTERVAL: 174 MS
EKG Q-T INTERVAL: 384 MS
EKG QRS DURATION: 92 MS
EKG QTC CALCULATION (BAZETT): 411 MS
EKG R AXIS: -19 DEGREES
EKG T AXIS: 3 DEGREES
EKG VENTRICULAR RATE: 69 BPM

## 2025-04-26 PROCEDURE — 93010 ELECTROCARDIOGRAM REPORT: CPT | Performed by: INTERNAL MEDICINE

## 2025-06-04 ENCOUNTER — TELEPHONE (OUTPATIENT)
Age: 65
End: 2025-06-04

## 2025-06-04 NOTE — TELEPHONE ENCOUNTER
Referral received from PCP Michael Brandt to be seen for Mild Cognitive Disorder. The voicemail box is full.

## (undated) DEVICE — SET SEALS HYSTEROSCOPE DISP -- MYOSURE  EA=10

## (undated) DEVICE — KIT COLON W/ 1.1OZ LUB AND 2 END

## (undated) DEVICE — GOWN,SIRUS,NONRNF,SETINSLV,XL,20/CS: Brand: MEDLINE

## (undated) DEVICE — SOLIDIFIER MEDC 1200ML -- CONVERT TO 356117

## (undated) DEVICE — TUBE ST FLD AQUILEX OUTFLO --

## (undated) DEVICE — SET GRAV CK VLV NEEDLESS ST 3 GANGED 4WAY STPCOCK HI FLO 10

## (undated) DEVICE — Z INACTIVE USE 2527070 DRAPE SURG W40XL44IN UNDERBUTTOCK SMS POLYPR W/ PCH BK DISP

## (undated) DEVICE — ADULT SPO2 SENSOR: Brand: NELLCOR

## (undated) DEVICE — CANN NASAL O2 CAPNOGRAPHY AD -- FILTERLINE

## (undated) DEVICE — CATH URETH INTMIT ROB 16FR FUN -- CONVERT TO ITEM 179520

## (undated) DEVICE — LIGHT HANDLE: Brand: DEVON

## (undated) DEVICE — PREP PAD BNS: Brand: CONVERTORS

## (undated) DEVICE — 1200 GUARD II KIT W/5MM TUBE W/O VAC TUBE: Brand: GUARDIAN

## (undated) DEVICE — PAD,SANITARY,11 IN,MAXI,N-STRL,IND WRAP: Brand: MEDLINE

## (undated) DEVICE — STERILE POLYISOPRENE POWDER-FREE SURGICAL GLOVES: Brand: PROTEXIS

## (undated) DEVICE — TELFA NON-ADHERENT ABSORBENT DRESSING: Brand: TELFA

## (undated) DEVICE — STRAP RESTRAIN W3.5XL19IN TECLIN STRRP POS LEG DURING LITH

## (undated) DEVICE — Device

## (undated) DEVICE — ESOPHAGEAL BALLOON DILATATION CATHETER: Brand: CRE FIXED WIRE

## (undated) DEVICE — SOLUTION IRRIG 3000ML 0.9% SOD CHL FLX CONT 0797208] ICU MEDICAL INC]

## (undated) DEVICE — TUBE ST FLD CTRL AQUILEX INFLO --

## (undated) DEVICE — MARKER SKN RUL VIO STRL

## (undated) DEVICE — TRAY PREP DRY W/ PREM GLV 2 APPL 6 SPNG 2 UNDPD 1 OVERWRAP

## (undated) DEVICE — DEVICE TISS RETRV 3MMX25.25IN -- MYOSURE

## (undated) DEVICE — BAG BELONG PT PERS CLEAR HANDL

## (undated) DEVICE — PACK,LITHOTOMY,PK I: Brand: MEDLINE

## (undated) DEVICE — X-RAY SPONGES,16 PLY: Brand: DERMACEA

## (undated) DEVICE — KENDALL RADIOLUCENT FOAM MONITORING ELECTRODE -RECTANGULAR SHAPE: Brand: KENDALL

## (undated) DEVICE — SKIN MARKER,REGULAR TIP WITH RULER AND LABELS: Brand: DEVON

## (undated) DEVICE — SET ADMIN 16ML TBNG L100IN 2 Y INJ SITE IV PIGGY BK DISP

## (undated) DEVICE — DEVON™ KNEE AND BODY STRAP 60" X 3" (1.5 M X 7.6 CM): Brand: DEVON

## (undated) DEVICE — 3M™ CUROS™ DISINFECTING CAP FOR NEEDLELESS CONNECTORS 270/CARTON 20 CARTONS/CASE CFF1-270: Brand: CUROS™

## (undated) DEVICE — CATH IV AUTOGRD BC PNK 20GA 25 -- INSYTE

## (undated) DEVICE — SIMPLICITY FLUFF UNDERPAD 23X36, MODERATE: Brand: SIMPLICITY

## (undated) DEVICE — INFECTION CONTROL KIT SYS

## (undated) DEVICE — JELLY,LUBE,STERILE,FLIP TOP,TUBE,4-OZ: Brand: MEDLINE